# Patient Record
Sex: FEMALE | Race: BLACK OR AFRICAN AMERICAN | NOT HISPANIC OR LATINO | Employment: OTHER | URBAN - METROPOLITAN AREA
[De-identification: names, ages, dates, MRNs, and addresses within clinical notes are randomized per-mention and may not be internally consistent; named-entity substitution may affect disease eponyms.]

---

## 2017-03-15 DIAGNOSIS — G70.00 MYASTHENIA GRAVIS: ICD-10-CM

## 2017-03-15 RX ORDER — PYRIDOSTIGMINE BROMIDE 60 MG/1
TABLET ORAL
Qty: 90 TABLET | Refills: 3 | Status: SHIPPED | OUTPATIENT
Start: 2017-03-15 | End: 2017-07-12 | Stop reason: SDUPTHER

## 2017-06-23 ENCOUNTER — TELEPHONE (OUTPATIENT)
Dept: NEUROLOGY | Facility: CLINIC | Age: 62
End: 2017-06-23

## 2017-06-23 NOTE — TELEPHONE ENCOUNTER
----- Message from Evelyn Suh sent at 6/23/2017 11:48 AM CDT -----  Contact: Patient [phone]  Patient is returning Sue's call. Please call

## 2017-07-12 ENCOUNTER — OFFICE VISIT (OUTPATIENT)
Dept: NEUROLOGY | Facility: CLINIC | Age: 62
End: 2017-07-12
Payer: COMMERCIAL

## 2017-07-12 ENCOUNTER — TELEPHONE (OUTPATIENT)
Dept: NEUROLOGY | Facility: CLINIC | Age: 62
End: 2017-07-12

## 2017-07-12 VITALS
BODY MASS INDEX: 24.52 KG/M2 | HEART RATE: 74 BPM | DIASTOLIC BLOOD PRESSURE: 80 MMHG | SYSTOLIC BLOOD PRESSURE: 137 MMHG | HEIGHT: 70 IN | WEIGHT: 171.31 LBS

## 2017-07-12 DIAGNOSIS — E11.9 TYPE 2 DIABETES MELLITUS WITHOUT COMPLICATION, WITH LONG-TERM CURRENT USE OF INSULIN: ICD-10-CM

## 2017-07-12 DIAGNOSIS — G70.00 MYASTHENIA GRAVIS: Primary | ICD-10-CM

## 2017-07-12 DIAGNOSIS — Z79.4 TYPE 2 DIABETES MELLITUS WITHOUT COMPLICATION, WITH LONG-TERM CURRENT USE OF INSULIN: ICD-10-CM

## 2017-07-12 DIAGNOSIS — Z90.89 H/O THYMECTOMY: ICD-10-CM

## 2017-07-12 PROCEDURE — 99213 OFFICE O/P EST LOW 20 MIN: CPT | Mod: S$GLB,,, | Performed by: PHYSICIAN ASSISTANT

## 2017-07-12 PROCEDURE — 99999 PR PBB SHADOW E&M-EST. PATIENT-LVL III: CPT | Mod: PBBFAC,,, | Performed by: PHYSICIAN ASSISTANT

## 2017-07-12 RX ORDER — PREDNISONE 5 MG/1
TABLET ORAL
COMMUNITY
Start: 2017-05-08 | End: 2017-07-12 | Stop reason: SDUPTHER

## 2017-07-12 RX ORDER — PREDNISONE 1 MG/1
TABLET ORAL
COMMUNITY
Start: 2017-05-31 | End: 2017-07-12 | Stop reason: SDUPTHER

## 2017-07-12 RX ORDER — INSULIN DEGLUDEC 200 U/ML
INJECTION, SOLUTION SUBCUTANEOUS
COMMUNITY
Start: 2017-07-04

## 2017-07-12 RX ORDER — PREDNISONE 1 MG/1
3 TABLET ORAL DAILY
Qty: 90 TABLET | Refills: 3 | Status: SHIPPED | OUTPATIENT
Start: 2017-07-12 | End: 2018-02-27 | Stop reason: SDUPTHER

## 2017-07-12 RX ORDER — PYRIDOSTIGMINE BROMIDE 60 MG/1
30 TABLET ORAL 2 TIMES DAILY
Qty: 45 TABLET | Refills: 3 | Status: SHIPPED | OUTPATIENT
Start: 2017-07-12 | End: 2018-02-27 | Stop reason: SDUPTHER

## 2017-07-12 RX ORDER — INSULIN ASPART 100 [IU]/ML
INJECTION, SOLUTION INTRAVENOUS; SUBCUTANEOUS
COMMUNITY
Start: 2017-06-07

## 2017-07-12 RX ORDER — PREDNISONE 5 MG/1
5 TABLET ORAL DAILY
Qty: 30 TABLET | Refills: 5 | Status: SHIPPED | OUTPATIENT
Start: 2017-07-12 | End: 2018-02-27 | Stop reason: SDUPTHER

## 2017-07-12 NOTE — TELEPHONE ENCOUNTER
RN contacted patient regarding medication. Provider states for patient to decrease Prednisone to 8mg and continue Mestion 30mg BID. Patient verbalized understanding.     Schedule MBSS at [facility] (Ph [phone]) for 7/17/17 at 8AM. Faxed order, clinic notes and demographic info (Fax [phone]).

## 2017-07-12 NOTE — PATIENT INSTRUCTIONS
Decrease Prednisone to 8 mg QD.  Continue Mestinon 30 mg twice daily.    We will get a swallow study done locally.     Can try taking half a pill of Mestinon once daily for one week, if no change in symptoms can try stopping the Mestinon.

## 2017-07-12 NOTE — PROGRESS NOTES
[facility], Department of Neurology   [facility] [place] [address]  Phone:[phone]  Fax: [phone]    Patient Name: Louisa Quarles  : 1955  MRN:  [id number]    2017     chief complaint: myasthenia gravis     PCP: Manish Anderson MD.      DX: Ab+ MG, dx'ed   Thymus: s/p thymectomy 2011     Mntc:   *Prednisone 40mg/d, starting ; decreased from 20 -> 15mg/d  on 14; decreased from 15mg/d to 10mg/d on 10/6/14; prednisone 7mg/d to 5mg/d 2015     *Mestinon 30mg tid to bid      Prior medications  *Imuran started 50mg/d 3/31/14, increased to 50mg bid ; increased to 100mg/50mg 10/6/14; stopped 5/15 d/t marrow suppression     Rescue:  PLEX, last one 2014 - moderately helpful  IVIG Dec 26-30, mildly effective     Interval hx 17:  Louisa Quarles is a 63 yo female with GERD, DMII with peripheral neuropathy and Ab+ oculobulbar MG dx  s/p thymectomy. Currently on Prednisone 9 mg qd (dec from 10 last visit) and Mestinon 30 mg bid.     Stopped omeprazole and notes swallowing has much improved. Still does have occasional difficulty though.     She is eager to stop some of her many meds, feels like they cause side effects. Last a1c was 8.     Occasionally feels generalized weakness but still able to perform all daily activities. Has some instances of feeling off balance which she attributes to her neuropathy. Denies ptosis, diplopia. Denies sob/orthopnea.       Interval hx 16:  Louisa Quarles is a 61 female with DMII and Ab+ oculobulbar MG dx'ed , s/p thymectomy . MG maintained on Prednisone 10 mg qOD (since 2016) and Mestinon 30 mg tid. MG overall stable. Does note long hx of sensation of food getting stuck when swallowing. States she is seeing her PCP soon for this and he may order endoscopy. When she takes her time chewing, this sensation is decreased. Has noticed when she begins walking down the stairs has to  "hold on to rail. Recent A1c of 8.6, PCP in process of better management of DM. Denies diplopia/ptosis, SOB, extremity weakness.    HPI 6/30/16:  Louisa Quarles is a 60 yo female with DMII and Ab+ oculobulbar MG dx'ed 2011 with hx of thymoma s/p thymectomy 11/2011. States she is doing well, does have occasional generalized weakness/ muscle aches. Notices a little difficulty with balance, denies falls. MG is well maintained on Prednisone 10 mg qOD (since 1/2016) and Mestinon 30 mg BID. D/C'ed Imuran 5/2015 due to marrow suppression. Has had both IVIG and PLEX in the past with minimal improvement. Denies SOB, diplopia/ptosis, dysarthria.       Interval 1/15/16: Doing well. No diplopia, ptosis, dysarthria or weakness. Prednisone 5mg/d, began last April.      Interval 8/19/15: Stopped imuran in May d/t marrow suppression. She was supposed to call me in June for follow up, but did not do so. She returns today in follow up.      Currently doing well. Minor symptoms.      Interval 4/6/15:  Doing well. No difficulty with diplopia, ptosis, dysarthria or dysphagia. No breathing issues.      Interval 1/5/15:  No problems chewing and swallowing. Vision has been 'okay'. No ptosis or diplopia. Sugar was better after we decreased the prednisone, but here lately has been a little higher.      Interval 10/6/14: No ptosis, no diplopia, no chewing and swallowing problems. No peripheral weakness. Has some blurry vision, but has been to ophtho and no cataracts. Sugars are running a little high - 300s are the norm.      Interval history: Began prednisone 40mg/d last month, along with mestinon. She's doing better in regards to ptosis, diplopia, chewing and swallowing. She feels good, but says that her vision is getting "bad". She has trouble with reading. She will make an eye appointment this week. Her sugar has been running a little high - rarely 300s - she's trying to control her diet and take her meds as prescribed.      Interval " "history: Ab+ MG s/p thymectomy, recently admitted to hospital for exacerbation and s/p PLEX and initiation of prednisone, returns today two weeks post-discharge. She was only provided with ten days of prednisone on discharge and she did not call the clinic to obtain more medication. She is starting to have more trouble swallowing in the last week or so. Her speech is also rather dysarthric.      HPI: Returns today. Her MG has been slowly getting worse over the last few months. More trouble with speech, swallowing and arm > leg weakness. Right eye ptosis.      Received IVIg Dec 26-30. She says that it "helped some", but did not improve her symptoms as much as she had hoped.      She requires liquids to help her swallow solid food b/c it "gets hung up" if she doesn't. She is thickening her liquids, not b/c she's choking, but b/c she's worried about choking. She doesn't have jorge luis orthopnea.      She has resumed taking mestinon, but she doesn't see that it's helping.      Her head is "heavy" lately - having trouble balancing it on her neck.      Background:  Louisa Quarles is a 60 y.o. female with Ab+, oculobulbar MG, diagnosed in 2011 after presenting with bulbar symptoms (ptosis, diplopia, dsyarthria). She responded to IVIg and underwent a workup, which revealed a thymoma. She is s/p thymectomy in November of 2011 followed by adjuvant radiation therapy. She has been maintained on Imuran 50mg/day, mestinon 30mg tid. She has been on prednisone in the past, but it was discontinued in December 2012.       Medications:   Current Outpatient Prescriptions   Medication Sig Dispense Refill    ACCU-CHEK SHAKIRA PLUS METER Misc       ACCU-CHEK SHAKIRA PLUS Strp       ACCU-CHEK MULTICLIX LANCET lancets       alpha lipoic acid 600 mg Cap Take by mouth once daily.      aspirin 81 MG chewable tablet Take 81 mg by mouth once daily.        atorvastatin (LIPITOR) 10 MG tablet Take 10 mg by mouth once daily.      azathioprine " (IMURAN) 50 mg Tab Take 2 pills in the morning and one pill in the evening. 270 tablet 3    AZELASTINE/FLUTICASONE (DYMISTA NASL) by Nasal route.      calcium carbonate (OS-MAE) 600 mg (1,500 mg) Tab Take 600 mg by mouth 2 (two) times daily with meals.        calcium citrate-vitamin D3 315-200 mg (CITRACAL+D) 315-200 mg-unit per tablet Take by mouth 2 (two) times daily.      carvedilol (COREG) 3.125 MG tablet Take 3.125 mg by mouth 2 (two) times daily with meals.      citalopram (CELEXA) 20 MG tablet Take 20 mg by mouth once daily.        docusate sodium (COLACE) 100 MG capsule Take 100 mg by mouth.        HUMALOG KWIKPEN 100 unit/mL InPn pen       insulin aspart protamine-insulin aspart (NOVOLOG 70/30) 100 unit/mL (70-30) InPn Inject 100 Units into the skin 3 (three) times daily.        insulin detemir (LEVEMIR) 100 unit/mL injection Inject 100 Units into the skin every evening.        insulin glargine (LANTUS) 100 unit/mL Crtg Inject 60 Units into the skin once daily.        ipratropium (ATROVENT) 0.03 % nasal spray 2 sprays by Nasal route 2 (two) times daily.      magnesium oxide-Mg AA chelate (MG-PLUS-PROTEIN) 133 mg Tab Take 250 mg by mouth.      melatonin 1 mg Tab Take by mouth.        metformin (GLUCOPHAGE) 500 MG tablet Take 500 mg by mouth before breakfast.        NOVOLOG FLEXPEN 100 unit/mL InPn pen       nystatin (MYCOSTATIN) 100,000 unit/mL suspension 100,000 mg.      omeprazole (PRILOSEC) 40 MG capsule Take 1 capsule (40 mg total) by mouth once daily. 30 capsule 10    oxycodone-acetaminophen (PERCOCET) 5-325 mg per tablet Take 1 tablet by mouth every 6 (six) hours as needed.        potassium chloride (KLOR-CON) 20 mEq packet Take 20 mEq by mouth once daily. 30 tablet 10    predniSONE (DELTASONE) 1 MG tablet       predniSONE (DELTASONE) 5 MG tablet       pyridostigmine (MESTINON) 60 mg Tab TAKE ONE-HALF TABLET BY MOUTH THREE TIMES DAILY 90 tablet 3    TRESIBA FLEXTOUCH U-200 200  "unit/mL (3 mL) InPn       vitamin D 185 MG Tab Take 185 mg by mouth once daily.        zolpidem (AMBIEN) 10 mg Tab 10 mg.       No current facility-administered medications for this visit.        Allergies:  No Known Allergies    PMHx:  Past Medical History:   Diagnosis Date    Cancer     Diabetes mellitus     Headache(784.0)     HEARING LOSS     Memory loss     Thymoma     Vision abnormalities     Stargadtch's disease/Macular degeneration -causes vision loss bilaterall, right worse than left      Past Surgical History:   Procedure Laterality Date    COLONOSCOPY      thymus removal         Fhx:  Family History   Problem Relation Age of Onset    Cancer Sister      breast    Diabetes Sister        Shx:   Social History     Social History    Marital status: Single     Spouse name: N/A    Number of children: N/A    Years of education: N/A     Occupational History    Not on file.     Social History Main Topics    Smoking status: Former Smoker     Packs/day: 0.50     Years: 10.00     Types: Cigarettes     Quit date: 8/1/2011    Smokeless tobacco: Not on file    Alcohol use No    Drug use: No    Sexual activity: Not on file     Other Topics Concern    Not on file     Social History Narrative    No narrative on file       ROS:  Review of Systems (Questions asked; positives or additions noted in BOLD)  Gen Malaise/fatigue, weight change   HEENT Hearing loss, blurred vision, diplopia   Card CP, palpitations   Pulm SOB, cough    Vasc Easy bruising, easy bleeding    GI Nausea, vomiting, constipation    Frequency, urgency   M/S Joint pain, myalgias, falls    Endocrine Temperature intolerance, polyuria, polydipsia   Neuro Dizziness, tremors, seizures, focal weakness, Others- as noted in HPI   Psych Memory loss, depression, anxiety, hallucinations     PHYSICAL EXAM:   /80   Pulse 74   Ht 5' 10" (1.778 m)   Wt 77.7 kg (171 lb 4.8 oz)   BMI 24.58 kg/m²    GEN:  NAD, well-developed, " well-groomed.  HEENT: No cervical lymphadenopathy noted.  CARDIOVASCULAR: Radial pulses 2+ bilaterally. No LE edema noted.  NEURO:  Mental Status: Awake, alert, and oriented. Normal attention and concentration.  Speech is fluent and appropriate language function.    Cranial Nerves:  II Pupils are round and reactive to direct and consensual light and accommodation. Visual fields full to confrontation.   III, IV, VI Extraocular eye movements full bilaterally. No ptosis noted.     V Normal facial sensation to pinprick and light touch.   VII Symmetric facial expressions.   VIII Hearing intact to finger rub bilaterally.   IX, X Palate elevates midline and symmetrically.   XI Shoulder elevation is symmetric and full strength bilaterally. Neck rotation strength is normal bilaterally.   XII Tongue is midline.     Sensory: Sensation is slightly decreased to vibration in bilateral ankles, normal to pinprick.     Motor: Extremities demonstrate normal bulk and tone in all four limbs. No atrophy or fasciculations noted.   Strength-       RUE: 5/5                LUE: 5/5                RLE: 5/5                LLE: 5/5   No orbicularis oris weakness or orbicularis oculi weakness.   No neck extension/flexion weakness.     Deep Tendon Relfexes: 2+ and symmetric in the upper extremities, trace at the knees and ankles.     Coordination: Finger to nose WNL.     Gait: Normal casual gait.    ASSESSMENT:     ICD-10-CM ICD-9-CM   1. Myasthenia gravis G70.00 358.00   2. Type 2 diabetes mellitus without complication, with long-term current use of insulin E11.9 250.00    Z79.4 V58.67       PLAN:     No orders of the defined types were placed in this encounter.    Louisa Quarles is a 60 y.o. female with DMII and Ab+ MG, s/p thymectomy. MG well controlled. Mild reduction to vibration in feet, likely diabetic neuropathy.     -decrease Prednisone to 8 mg qd   -would still like to obtain MBSS, will have RN send orders to [facility]  -RTC 6-8  wks         The patient indicates understanding of these issues and agrees to the plan.      Attending, Dr. Chaudhari, was available during today's encounter. Any change to plan along with cosign to appear in the EMR.       This document has been electronically signed by Donna Caceres PA-C on 7/12/2017, 8:14 AM. I have personally typed this message using the EMR.

## 2017-08-01 ENCOUNTER — DOCUMENTATION ONLY (OUTPATIENT)
Dept: NEUROLOGY | Facility: CLINIC | Age: 62
End: 2017-08-01

## 2017-09-11 ENCOUNTER — TELEPHONE (OUTPATIENT)
Dept: NEUROLOGY | Facility: CLINIC | Age: 62
End: 2017-09-11

## 2017-09-11 NOTE — TELEPHONE ENCOUNTER
Patient would like to reschedule appt for later date. Mrs. Quarles stated the [facility] ([phone]) set a letter dated 8/4/7 Claim# [id number] requesting a referral from Dr. Chaudhari... In order to approve appt. She has an appt with PCP, Dr. Anderson on 9/12/17 at 10:45AM.

## 2017-09-11 NOTE — TELEPHONE ENCOUNTER
----- Message from Evelyn Suh sent at 9/11/2017  3:44 PM CDT -----  Contact: Patient [phone]  Patient states she received a letter from her insurance [employer], that states our office has to send a referral to them for the appt on 9/12/17.

## 2017-10-30 ENCOUNTER — TELEPHONE (OUTPATIENT)
Dept: NEUROLOGY | Facility: CLINIC | Age: 62
End: 2017-10-30

## 2017-10-30 NOTE — TELEPHONE ENCOUNTER
----- Message from Kelli Ponce sent at 10/30/2017 11:03 AM CDT -----  Contact: Self  Pt is calling to speak with Ms. Rogers regarding her appts.    She can be reached at [phone].    Thank you.

## 2017-11-01 ENCOUNTER — TELEPHONE (OUTPATIENT)
Dept: NEUROLOGY | Facility: CLINIC | Age: 62
End: 2017-11-01

## 2017-11-01 NOTE — TELEPHONE ENCOUNTER
----- Message from Indigo Lopez sent at 11/1/2017  3:34 PM CDT -----  Contact: self @ [phone]  Pt says Sue told her to call her concerning an appt with Dr Chaudhari.  I tried to offer her an appt but she wants to speak with Sue.  pls call.

## 2018-02-27 ENCOUNTER — OFFICE VISIT (OUTPATIENT)
Dept: NEUROLOGY | Facility: CLINIC | Age: 63
End: 2018-02-27
Payer: COMMERCIAL

## 2018-02-27 VITALS — SYSTOLIC BLOOD PRESSURE: 138 MMHG | HEART RATE: 67 BPM | HEIGHT: 69 IN | DIASTOLIC BLOOD PRESSURE: 89 MMHG

## 2018-02-27 DIAGNOSIS — E11.9 TYPE 2 DIABETES MELLITUS WITHOUT COMPLICATION, UNSPECIFIED LONG TERM INSULIN USE STATUS: ICD-10-CM

## 2018-02-27 DIAGNOSIS — Z79.52 ON PREDNISONE THERAPY: ICD-10-CM

## 2018-02-27 DIAGNOSIS — Z90.89 HISTORY OF THYMECTOMY: ICD-10-CM

## 2018-02-27 DIAGNOSIS — G70.00 MYASTHENIA GRAVIS: ICD-10-CM

## 2018-02-27 DIAGNOSIS — G70.00 MYASTHENIA GRAVIS, ACHR ANTIBODY POSITIVE: Primary | ICD-10-CM

## 2018-02-27 DIAGNOSIS — Z92.3 HISTORY OF RADIATION THERAPY: ICD-10-CM

## 2018-02-27 PROCEDURE — 99215 OFFICE O/P EST HI 40 MIN: CPT | Mod: S$GLB,,, | Performed by: PSYCHIATRY & NEUROLOGY

## 2018-02-27 PROCEDURE — 99999 PR PBB SHADOW E&M-EST. PATIENT-LVL III: CPT | Mod: PBBFAC,,, | Performed by: PSYCHIATRY & NEUROLOGY

## 2018-02-27 RX ORDER — PREDNISONE 5 MG/1
TABLET ORAL
Qty: 45 TABLET | Refills: 3 | Status: SHIPPED | OUTPATIENT
Start: 2018-02-27 | End: 2018-07-10

## 2018-02-27 RX ORDER — PREDNISONE 1 MG/1
TABLET ORAL
Qty: 45 TABLET | Refills: 3 | Status: SHIPPED | OUTPATIENT
Start: 2018-02-27 | End: 2018-07-10 | Stop reason: DRUGHIGH

## 2018-02-27 RX ORDER — PYRIDOSTIGMINE BROMIDE 60 MG/1
30 TABLET ORAL 2 TIMES DAILY
Qty: 90 TABLET | Refills: 3 | Status: SHIPPED | OUTPATIENT
Start: 2018-02-27 | End: 2018-07-10 | Stop reason: DRUGHIGH

## 2018-02-27 NOTE — LETTER
February 27, 2018      Manish Anderson MD  [address] [facility]  [place] [address]           [facility]  [address]  [place] [address]  Phone: [phone]  Fax: [phone]          Patient: Louisa Quarles   MR Number: [id number]   YOB: 1955   Date of Visit: 2/27/2018       Dear Dr. Manish Anderson:    Thank you for referring Louisa Quarles to me for evaluation. Attached you will find relevant portions of my assessment and plan of care.    If you have questions, please do not hesitate to call me. I look forward to following Louisa Quarles along with you.    Sincerely,    Adrian Chaudhari MD    Enclosure  CC:  No Recipients    If you would like to receive this communication electronically, please contact [email or web address] or [phone] to request more information on [employer] Link access.    For providers and/or their staff who would like to refer a patient to [facility], please contact us through our one-stop-shop provider referral line, [facility] [place], at [phone].    If you feel you have received this communication in error or would no longer like to receive these types of communications, please e-mail [email or web address]

## 2018-02-27 NOTE — PROGRESS NOTES
[facility], Department of Neurology   [address] [place] [address]  Phone:[phone]  Fax: [phone]    Patient Name: Louisa Quarles  : 1955  MRN:  [id number]    2018     chief complaint: myasthenia gravis     PCP: Manish Anderson MD.      DX: Ab+ MG, dx'ed   Thymus: s/p thymectomy 2011     Mntc:   *Prednisone 40mg/d, starting ; decreased from 20 -> 15mg/d  on 14; decreased from 15mg/d to 10mg/d on 10/6/14; prednisone 7mg/d to 5mg/d 2015     *Mestinon 30mg tid to bid      Prior medications  *Imuran started 50mg/d 3/31/14, increased to 50mg bid ; increased to 100mg/50mg 10/6/14; stopped 5/15 d/t marrow suppression     Rescue:  PLEX, last one 2014 - moderately helpful  IVIG Dec 26-30, mildly effective    Interval history 18  Dropped prednisone to 8mg/d at  visit.  Now taking it 8mg qOD.   MBSS : normal     Interval hx 17:  Louisa Quarles is a 63 yo female with GERD, DMII with peripheral neuropathy and Ab+ oculobulbar MG dx  s/p thymectomy. Currently on Prednisone 9 mg qd (dec from 10 last visit) and Mestinon 30 mg bid.     Stopped omeprazole and notes swallowing has much improved. Still does have occasional difficulty though.     She is eager to stop some of her many meds, feels like they cause side effects. Last a1c was 8.     Occasionally feels generalized weakness but still able to perform all daily activities. Has some instances of feeling off balance which she attributes to her neuropathy. Denies ptosis, diplopia. Denies sob/orthopnea.       Interval hx 16:  Louisa Quarles is a 61 female with DMII and Ab+ oculobulbar MG dx'ed , s/p thymectomy . MG maintained on Prednisone 10 mg qOD (since 2016) and Mestinon 30 mg tid. MG overall stable. Does note long hx of sensation of food getting stuck when swallowing. States she is seeing her PCP soon for this and he may order endoscopy. When  "she takes her time chewing, this sensation is decreased. Has noticed when she begins walking down the stairs has to hold on to rail. Recent A1c of 8.6, PCP in process of better management of DM. Denies diplopia/ptosis, SOB, extremity weakness.    HPI 6/30/16:  Louisa Quarles is a 60 yo female with DMII and Ab+ oculobulbar MG dx'ed 2011 with hx of thymoma s/p thymectomy 11/2011. States she is doing well, does have occasional generalized weakness/ muscle aches. Notices a little difficulty with balance, denies falls. MG is well maintained on Prednisone 10 mg qOD (since 1/2016) and Mestinon 30 mg BID. D/C'ed Imuran 5/2015 due to marrow suppression. Has had both IVIG and PLEX in the past with minimal improvement. Denies SOB, diplopia/ptosis, dysarthria.       Interval 1/15/16: Doing well. No diplopia, ptosis, dysarthria or weakness. Prednisone 5mg/d, began last April.      Interval 8/19/15: Stopped imuran in May d/t marrow suppression. She was supposed to call me in June for follow up, but did not do so. She returns today in follow up.      Currently doing well. Minor symptoms.      Interval 4/6/15:  Doing well. No difficulty with diplopia, ptosis, dysarthria or dysphagia. No breathing issues.      Interval 1/5/15:  No problems chewing and swallowing. Vision has been 'okay'. No ptosis or diplopia. Sugar was better after we decreased the prednisone, but here lately has been a little higher.      Interval 10/6/14: No ptosis, no diplopia, no chewing and swallowing problems. No peripheral weakness. Has some blurry vision, but has been to ophtho and no cataracts. Sugars are running a little high - 300s are the norm.      Interval history: Began prednisone 40mg/d last month, along with mestinon. She's doing better in regards to ptosis, diplopia, chewing and swallowing. She feels good, but says that her vision is getting "bad". She has trouble with reading. She will make an eye appointment this week. Her sugar has been " "running a little high - rarely 300s - she's trying to control her diet and take her meds as prescribed.      Interval history: Ab+ MG s/p thymectomy, recently admitted to hospital for exacerbation and s/p PLEX and initiation of prednisone, returns today two weeks post-discharge. She was only provided with ten days of prednisone on discharge and she did not call the clinic to obtain more medication. She is starting to have more trouble swallowing in the last week or so. Her speech is also rather dysarthric.      HPI: Returns today. Her MG has been slowly getting worse over the last few months. More trouble with speech, swallowing and arm > leg weakness. Right eye ptosis.      Received IVIg Dec 26-30. She says that it "helped some", but did not improve her symptoms as much as she had hoped.      She requires liquids to help her swallow solid food b/c it "gets hung up" if she doesn't. She is thickening her liquids, not b/c she's choking, but b/c she's worried about choking. She doesn't have jorge luis orthopnea.      She has resumed taking mestinon, but she doesn't see that it's helping.      Her head is "heavy" lately - having trouble balancing it on her neck.      Background:  Louisa Quarles is a 60 y.o. female with Ab+, oculobulbar MG, diagnosed in 2011 after presenting with bulbar symptoms (ptosis, diplopia, dsyarthria). She responded to IVIg and underwent a workup, which revealed a thymoma. She is s/p thymectomy in November of 2011 followed by adjuvant radiation therapy. She has been maintained on Imuran 50mg/day, mestinon 30mg tid. She has been on prednisone in the past, but it was discontinued in December 2012.       Medications:   Current Outpatient Prescriptions   Medication Sig Dispense Refill    ACCU-CHEK SHAKIRA PLUS METER Misc       ACCU-CHEK SHAKIRA PLUS Strp       ACCU-CHEK MULTICLIX LANCET lancets       alpha lipoic acid 600 mg Cap Take by mouth once daily.      aspirin 81 MG chewable tablet Take 81 mg " by mouth once daily.        atorvastatin (LIPITOR) 10 MG tablet Take 10 mg by mouth once daily.      azathioprine (IMURAN) 50 mg Tab Take 2 pills in the morning and one pill in the evening. 270 tablet 3    AZELASTINE/FLUTICASONE (DYMISTA NASL) by Nasal route.      calcium carbonate (OS-MAE) 600 mg (1,500 mg) Tab Take 600 mg by mouth 2 (two) times daily with meals.        calcium citrate-vitamin D3 315-200 mg (CITRACAL+D) 315-200 mg-unit per tablet Take by mouth 2 (two) times daily.      carvedilol (COREG) 3.125 MG tablet Take 3.125 mg by mouth 2 (two) times daily with meals.      citalopram (CELEXA) 20 MG tablet Take 20 mg by mouth once daily.        docusate sodium (COLACE) 100 MG capsule Take 100 mg by mouth.        HUMALOG KWIKPEN 100 unit/mL InPn pen       insulin aspart protamine-insulin aspart (NOVOLOG 70/30) 100 unit/mL (70-30) InPn Inject 100 Units into the skin 3 (three) times daily.        insulin detemir (LEVEMIR) 100 unit/mL injection Inject 100 Units into the skin every evening.        insulin glargine (LANTUS) 100 unit/mL Crtg Inject 60 Units into the skin once daily.        ipratropium (ATROVENT) 0.03 % nasal spray 2 sprays by Nasal route 2 (two) times daily.      magnesium oxide-Mg AA chelate (MG-PLUS-PROTEIN) 133 mg Tab Take 250 mg by mouth.      melatonin 1 mg Tab Take by mouth.        metformin (GLUCOPHAGE) 500 MG tablet Take 500 mg by mouth before breakfast.        NOVOLOG FLEXPEN 100 unit/mL InPn pen       nystatin (MYCOSTATIN) 100,000 unit/mL suspension 100,000 mg.      omeprazole (PRILOSEC) 40 MG capsule Take 1 capsule (40 mg total) by mouth once daily. 30 capsule 10    oxycodone-acetaminophen (PERCOCET) 5-325 mg per tablet Take 1 tablet by mouth every 6 (six) hours as needed.        potassium chloride (KLOR-CON) 20 mEq packet Take 20 mEq by mouth once daily. 30 tablet 10    predniSONE (DELTASONE) 1 MG tablet Take one tablet every other day 45 tablet 3    predniSONE  (DELTASONE) 5 MG tablet Take one tablet every other day 45 tablet 3    pyridostigmine (MESTINON) 60 mg Tab Take 0.5 tablets (30 mg total) by mouth 2 (two) times daily. 90 tablet 3    TRESIBA FLEXTOUCH U-200 200 unit/mL (3 mL) InPn       vitamin D 185 MG Tab Take 185 mg by mouth once daily.        zolpidem (AMBIEN) 10 mg Tab 10 mg.       No current facility-administered medications for this visit.        Allergies:  No Known Allergies    PMHx:  Past Medical History:   Diagnosis Date    Cancer     Diabetes mellitus     Headache(784.0)     HEARING LOSS     History of thymectomy 7/16/2012    Memory loss     Myasthenia exacerbation     Myasthenia gravis with acute exacerbation     Myasthenia gravis with thymoma     Thymoma     Vision abnormalities     Stargadtch's disease/Macular degeneration -causes vision loss bilaterall, right worse than left      Past Surgical History:   Procedure Laterality Date    COLONOSCOPY      thymus removal         Fhx:  Family History   Problem Relation Age of Onset    Cancer Sister      breast    Diabetes Sister        Shx:   Social History     Social History    Marital status: Single     Spouse name: N/A    Number of children: N/A    Years of education: N/A     Occupational History    Not on file.     Social History Main Topics    Smoking status: Former Smoker     Packs/day: 0.50     Years: 10.00     Types: Cigarettes     Quit date: 8/1/2011    Smokeless tobacco: Not on file    Alcohol use No    Drug use: No    Sexual activity: Not on file     Other Topics Concern    Not on file     Social History Narrative    No narrative on file       ROS:  Review of Systems (Questions asked; positives or additions noted in BOLD)  Gen Malaise/fatigue, weight change   HEENT Hearing loss, blurred vision, diplopia   Card CP, palpitations   Pulm SOB, cough    Vasc Easy bruising, easy bleeding    GI Nausea, vomiting, constipation    Frequency, urgency   M/S Joint pain, myalgias,  "falls    Endocrine Temperature intolerance, polyuria, polydipsia   Neuro Dizziness, tremors, seizures, focal weakness, Others- as noted in HPI   Psych Memory loss, depression, anxiety, hallucinations     PHYSICAL EXAM:   /89   Pulse 67   Ht 5' 9" (1.753 m)    GEN:  NAD, well-developed, well-groomed.  HEENT: No cervical lymphadenopathy noted.  CARDIOVASCULAR: Radial pulses 2+ bilaterally. No LE edema noted.  NEURO:  Mental Status: Awake, alert, and oriented. Normal attention and concentration.  Speech is fluent and appropriate language function.    Cranial Nerves:  II Pupils are round and reactive to direct and consensual light and accommodation. Visual fields full to confrontation.   III, IV, VI Extraocular eye movements full bilaterally. No ptosis noted.     V Normal facial sensation to pinprick and light touch.   VII Symmetric facial expressions.   VIII Hearing intact to finger rub bilaterally.   IX, X Palate elevates midline and symmetrically.   XI Shoulder elevation is symmetric and full strength bilaterally. Neck rotation strength is normal bilaterally.   XII Tongue is midline.     Sensory: Sensation is slightly decreased to vibration in bilateral ankles, normal to pinprick.     Motor: Extremities demonstrate normal bulk and tone in all four limbs. No atrophy or fasciculations noted.   Strength-       RUE: 5/5                LUE: 5/5                RLE: 5/5                LLE: 5/5   No orbicularis oris weakness or orbicularis oculi weakness.   No neck extension/flexion weakness.     Deep Tendon Relfexes: 2+ and symmetric in the upper extremities, trace at the knees and ankles.     Coordination: Finger to nose WNL.     Gait: Normal casual gait.    ASSESSMENT:   Problem List Items Addressed This Visit     Myasthenia gravis, AChR antibody positive - Primary    Overview     Ab+, thymoma positive, s/p resection, generalized MG, diagnosed 2011  Prednisone, mestinon  Rescue: Plex, IVIG; both moderately " helpful  Prior: Imuran -> marrow suppression         Current Assessment & Plan     Stable  Decrease prednisone to 6mg qOD    Refer to Dr Aguilar  RTC 6 mos           Relevant Orders    Ambulatory Referral to Internal Medicine    History of thymectomy    Overview     November 2011  Thymoma+  S/p radiation therapy         History of radiation therapy    Overview     After thymectomy 2011         DM type 2 (diabetes mellitus, type 2)    Relevant Orders    Ambulatory Referral to Internal Medicine    On prednisone therapy    Relevant Orders    Ambulatory Referral to Internal Medicine      Other Visit Diagnoses     Myasthenia gravis        Relevant Medications    pyridostigmine (MESTINON) 60 mg Tab    predniSONE (DELTASONE) 5 MG tablet    predniSONE (DELTASONE) 1 MG tablet

## 2018-07-10 ENCOUNTER — OFFICE VISIT (OUTPATIENT)
Dept: NEUROLOGY | Facility: CLINIC | Age: 63
End: 2018-07-10
Payer: COMMERCIAL

## 2018-07-10 VITALS
HEART RATE: 80 BPM | SYSTOLIC BLOOD PRESSURE: 155 MMHG | WEIGHT: 167.13 LBS | HEIGHT: 69 IN | BODY MASS INDEX: 24.75 KG/M2 | DIASTOLIC BLOOD PRESSURE: 87 MMHG

## 2018-07-10 DIAGNOSIS — G70.00 MYASTHENIA GRAVIS, ACHR ANTIBODY POSITIVE: ICD-10-CM

## 2018-07-10 PROCEDURE — 99214 OFFICE O/P EST MOD 30 MIN: CPT | Mod: GC,S$GLB,, | Performed by: PSYCHIATRY & NEUROLOGY

## 2018-07-10 PROCEDURE — 99999 PR PBB SHADOW E&M-EST. PATIENT-LVL V: CPT | Mod: PBBFAC,GC,, | Performed by: PSYCHIATRY & NEUROLOGY

## 2018-07-10 RX ORDER — PREDNISONE 1 MG/1
1 TABLET ORAL DAILY
Qty: 30 TABLET | Refills: 5 | Status: SHIPPED | OUTPATIENT
Start: 2018-07-10 | End: 2018-08-09

## 2018-07-10 RX ORDER — EFINACONAZOLE 100 MG/ML
SOLUTION TOPICAL
COMMUNITY
Start: 2018-05-02

## 2018-07-10 RX ORDER — PYRIDOSTIGMINE BROMIDE 60 MG/1
30 TABLET ORAL 4 TIMES DAILY PRN
Qty: 120 TABLET | Refills: 5 | Status: SHIPPED | OUTPATIENT
Start: 2018-07-10 | End: 2019-07-18 | Stop reason: SDUPTHER

## 2018-07-10 RX ORDER — ATORVASTATIN CALCIUM 20 MG/1
20 TABLET, FILM COATED ORAL
COMMUNITY
Start: 2018-06-22

## 2018-07-10 RX ORDER — PREDNISONE 5 MG/1
5 TABLET ORAL DAILY
Qty: 30 TABLET | Refills: 5 | Status: SHIPPED | OUTPATIENT
Start: 2018-07-10 | End: 2018-08-09

## 2018-07-10 NOTE — ASSESSMENT & PLAN NOTE
Increase Prednisone to 6 mg DAILY  Mestinon 30 mg 2-4 times a day prn (increase from bid)  CBC, CMP

## 2018-07-10 NOTE — PROGRESS NOTES
"[facility], Department of Neurology   [facility] [place] [address]  Phone:[phone]  Fax: [phone]    Patient Name: Louisa Quarles  : 1955  MRN:  [id number]     PCP: Manish Anderson MD.    7/10/2018     DX: Ab+ MG, dx'ed   Thymic status- s/p thymectomy 2011  Maintenance: prednisone 6 mg QoD, Mestinon 30 mg bid- prior *Prednisone 40mg/d, starting ; decreased from 20 -> 15mg/d  on 14; decreased from 15mg/d to 10mg/d on 10/6/14; prednisone 7mg/d to 5mg/d 2015 *Mestinon 30mg tid to bid   Last dose change: 2018  Rescue: IVIG and PLEX in past- PLEX, last one 2014 - moderately helpful  IVIG Dec 26-30, mildly effective  Prior immunemodulatory agents: Prior medications:   *Imuran started 50mg/d 3/31/14, increased to 50mg bid ; increased to 100mg/50mg 10/6/14; stopped 5/15 d/t marrow suppression        Interval Hx 7/10/18: Complaints of generalized weakness including in proximal muscles in upper and lower extremities. Having increased bilateral ptosis. HAs been noticing weakness for a couple of months and is continuing to progress. Feels like her talking is "getting heavy." Has trouble swallowing food associated with chewing fatigue. Patient also with Stargardt disease- progressive macular degeneration. Denies diplopia. Patient endorsing SOB when doing things. Patient states her "head is heavy." Currently taking prednisone 6 mg QoD. Stopped imuran in the past due to BM suppression. Taking mestinon 1/2 tablet twice a day (30 mg bid). Patient takes medication with breakfast and supper (almost 12 hrs apart 7p, give or take). Does not appreciate worsening of her symptoms as her day progresses.     Interval history 18  Dropped prednisone to 8mg/d at  visit.  Now taking it 8mg qOD.   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 Currently on Prednisone 9 mg qd (dec from 10 last visit) and Mestinon 30 mg bid.     Stopped omeprazole and notes swallowing has much improved. Still does have occasional difficulty though.     She is eager to stop some of her many meds, feels like they cause side effects. Last a1c was 8.     Occasionally feels generalized weakness but still able to perform all daily activities. Has some instances of feeling off balance which she attributes to her neuropathy. Denies ptosis, diplopia. Denies sob/orthopnea.       Interval hx 9/9/16:  Louisa Quarles is a 61 female with DMII and Ab+ oculobulbar MG dx'ed 2011, s/p thymectomy 2011. MG maintained on Prednisone 10 mg qOD (since 1/2016) and Mestinon 30 mg tid. MG overall stable. Does note long hx of sensation of food getting stuck when swallowing. States she is seeing her PCP soon for this and he may order endoscopy. When she takes her time chewing, this sensation is decreased. Has noticed when she begins walking down the stairs has to hold on to rail. Recent A1c of 8.6, PCP in process of better management of DM. Denies diplopia/ptosis, SOB, extremity weakness.    HPI 6/30/16:  Louisa Quarles is a 60 yo female with DMII and Ab+ oculobulbar MG dx'ed 2011 with hx of thymoma s/p thymectomy 11/2011. States she is doing well, does have occasional generalized weakness/ muscle aches. Notices a little difficulty with balance, denies falls. MG is well maintained on Prednisone 10 mg qOD (since 1/2016) and Mestinon 30 mg BID. D/C'ed Imuran 5/2015 due to marrow suppression. Has had both IVIG and PLEX in the past with minimal improvement. Denies SOB, diplopia/ptosis, dysarthria.       Interval 1/15/16: Doing well. No diplopia, ptosis, dysarthria or weakness. Prednisone 5mg/d, began last April.      Interval 8/19/15: Stopped imuran in May d/t marrow suppression. She was supposed to call me in June for follow up, but did not do so. She returns today in  "follow up.      Currently doing well. Minor symptoms.      Interval 4/6/15:  Doing well. No difficulty with diplopia, ptosis, dysarthria or dysphagia. No breathing issues.      Interval 1/5/15:  No problems chewing and swallowing. Vision has been 'okay'. No ptosis or diplopia. Sugar was better after we decreased the prednisone, but here lately has been a little higher.      Interval 10/6/14: No ptosis, no diplopia, no chewing and swallowing problems. No peripheral weakness. Has some blurry vision, but has been to ophtho and no cataracts. Sugars are running a little high - 300s are the norm.      Interval history: Began prednisone 40mg/d last month, along with mestinon. She's doing better in regards to ptosis, diplopia, chewing and swallowing. She feels good, but says that her vision is getting "bad". She has trouble with reading. She will make an eye appointment this week. Her sugar has been running a little high - rarely 300s - she's trying to control her diet and take her meds as prescribed.      Interval history: Ab+ MG s/p thymectomy, recently admitted to hospital for exacerbation and s/p PLEX and initiation of prednisone, returns today two weeks post-discharge. She was only provided with ten days of prednisone on discharge and she did not call the clinic to obtain more medication. She is starting to have more trouble swallowing in the last week or so. Her speech is also rather dysarthric.      HPI: Returns today. Her MG has been slowly getting worse over the last few months. More trouble with speech, swallowing and arm > leg weakness. Right eye ptosis.      Received IVIg Dec 26-30. She says that it "helped some", but did not improve her symptoms as much as she had hoped.      She requires liquids to help her swallow solid food b/c it "gets hung up" if she doesn't. She is thickening her liquids, not b/c she's choking, but b/c she's worried about choking. She doesn't have jorge luis orthopnea.      She has resumed " "taking mestinon, but she doesn't see that it's helping.      Her head is "heavy" lately - having trouble balancing it on her neck.      Background:  Louisa Quarles is a 60 y.o. female with Ab+, oculobulbar MG, diagnosed in 2011 after presenting with bulbar symptoms (ptosis, diplopia, dsyarthria). She responded to IVIg and underwent a workup, which revealed a thymoma. She is s/p thymectomy in November of 2011 followed by adjuvant radiation therapy. She has been maintained on Imuran 50mg/day, mestinon 30mg tid. She has been on prednisone in the past, but it was discontinued in December 2012.       Medications:   Current Outpatient Prescriptions   Medication Sig Dispense Refill    ACCU-CHEK SHAKIRA PLUS METER Misc       ACCU-CHEK SHAKIRA PLUS Strp       ACCU-CHEK MULTICLIX LANCET lancets       alpha lipoic acid 600 mg Cap Take by mouth once daily.      aspirin 81 MG chewable tablet Take 81 mg by mouth once daily.        atorvastatin (LIPITOR) 20 MG tablet 20 mg.      azathioprine (IMURAN) 50 mg Tab Take 2 pills in the morning and one pill in the evening. 270 tablet 3    AZELASTINE/FLUTICASONE (DYMISTA NASL) by Nasal route.      calcium carbonate (OS-MAE) 600 mg (1,500 mg) Tab Take 600 mg by mouth 2 (two) times daily with meals.        calcium citrate-vitamin D3 315-200 mg (CITRACAL+D) 315-200 mg-unit per tablet Take by mouth 2 (two) times daily.      carvedilol (COREG) 3.125 MG tablet Take 3.125 mg by mouth 2 (two) times daily with meals.      citalopram (CELEXA) 20 MG tablet Take 20 mg by mouth once daily.        docusate sodium (COLACE) 100 MG capsule Take 100 mg by mouth.        HUMALOG KWIKPEN 100 unit/mL InPn pen       insulin aspart protamine-insulin aspart (NOVOLOG 70/30) 100 unit/mL (70-30) InPn Inject 100 Units into the skin 3 (three) times daily.        insulin detemir (LEVEMIR) 100 unit/mL injection Inject 100 Units into the skin every evening.        insulin glargine (LANTUS) 100 unit/mL " Crtg Inject 60 Units into the skin once daily.        ipratropium (ATROVENT) 0.03 % nasal spray 2 sprays by Nasal route 2 (two) times daily.      JUBLIA 10 % Martin       magnesium oxide-Mg AA chelate (MG-PLUS-PROTEIN) 133 mg Tab Take 250 mg by mouth.      melatonin 1 mg Tab Take by mouth.        metformin (GLUCOPHAGE) 500 MG tablet Take 500 mg by mouth before breakfast.        NOVOLOG FLEXPEN 100 unit/mL InPn pen       nystatin (MYCOSTATIN) 100,000 unit/mL suspension 100,000 mg.      omeprazole (PRILOSEC) 40 MG capsule Take 1 capsule (40 mg total) by mouth once daily. 30 capsule 10    oxycodone-acetaminophen (PERCOCET) 5-325 mg per tablet Take 1 tablet by mouth every 6 (six) hours as needed.        potassium chloride (KLOR-CON) 20 mEq packet Take 20 mEq by mouth once daily. 30 tablet 10    predniSONE (DELTASONE) 1 MG tablet Take one tablet every other day 45 tablet 3    predniSONE (DELTASONE) 5 MG tablet Take one tablet every other day 45 tablet 3    pyridostigmine (MESTINON) 60 mg Tab Take 0.5 tablets (30 mg total) by mouth 2 (two) times daily. 90 tablet 3    TRESIBA FLEXTOUCH U-200 200 unit/mL (3 mL) InPn       vitamin D 185 MG Tab Take 185 mg by mouth once daily.        zolpidem (AMBIEN) 10 mg Tab 10 mg.       No current facility-administered medications for this visit.        Allergies:  No Known Allergies    PMHx:  Past Medical History:   Diagnosis Date    Cancer     Diabetes mellitus     Headache(784.0)     HEARING LOSS     History of thymectomy 7/16/2012    Memory loss     Myasthenia exacerbation     Myasthenia gravis with acute exacerbation     Myasthenia gravis with thymoma     Thymoma     Vision abnormalities     Stargadtch's disease/Macular degeneration -causes vision loss bilaterall, right worse than left      Past Surgical History:   Procedure Laterality Date    COLONOSCOPY      thymus removal         Fhx:  Family History   Problem Relation Age of Onset    Cancer Sister       "   breast    Diabetes Sister        Shx:   Social History     Social History    Marital status: Single     Spouse name: N/A    Number of children: N/A    Years of education: N/A     Occupational History    Not on file.     Social History Main Topics    Smoking status: Former Smoker     Packs/day: 0.50     Years: 10.00     Types: Cigarettes     Quit date: 8/1/2011    Smokeless tobacco: Not on file    Alcohol use No    Drug use: No    Sexual activity: Not on file     Other Topics Concern    Not on file     Social History Narrative    No narrative on file       ROS:   Review of Systems   Constitutional:+  for malaise/fatigue. Negative for weight loss.   HENT: Negative for hearing loss.   Eyes: + for blurred vision (progressive macular degeneration) and neg for double vision.   Respiratory: + for shortness of breath on exertion and neg for stridor.   Cardiovascular: Negative for chest pain and palpitations.   Gastrointestinal: Negative for nausea, vomiting and constipation.   Genitourinary: Negative for frequency. Negative for urgency.   Musculoskeletal: Negative for joint pain. Negative for myalgias and falls.   Skin: Negative for rash.   Neurological: Negative for dizziness and tremors. Negative for focal weakness and seizures.   Endo/Heme/Allergies: Does not bruise/bleed easily.   Psychiatric/Behavioral: Negative for memory loss. Negative for depression and hallucinations. The patient is not nervous/anxious.        PHYSICAL EXAM:   BP (!) 155/87   Pulse 80   Ht 5' 9" (1.753 m)   Wt 75.8 kg (167 lb 1.7 oz)   BMI 24.68 kg/m²    Neurologic Exam:     Mental status:  -- Facial Expression: normal   -- Affect: full   -- Orientation to time & place: Oriented to time, place, person and situation   -- Attention & concentration: Normal attention span and concentration   -- Memory: Recent and remote memory intact  -- Language: Spontaneous, fluent; able to repeat and name objects  -- Fund of knowledge: Aware of " current events   -- Speech: some slurred speech noted as we spoke more    Cranial nerves:  -- II: pupils equal, round, reactive to light and accommodation, Visual fields are full to confrontation.    -- III,IV,VI: extraocular muscles EOMI   -- V: facial light touch sensation normal   -- VII: upper facial muscle function normal bilaterally  -- V & VII: Corneal reflex present bilaterally   -- VIII: Response to voice.  Hearing is normal bilaterally.    -- IX: soft palate elevation normal bilaterally  -- X: Gag reflex not tested  -- XI: trapezius strength normal bilaterally, XI: sternocleidomastoid strength normal bilaterally  -- XII: tongue normal, midline     Motor examination:   -- Muscle tone: Normal in all 4 extremities   -- Muscle Bulk: Normal in all 4 extremities normal   -- Muscle strength: 5/5 in the upper and lower extremities bilaterally  -- Pronator drift: No  -- Fasciculations: No   -- Tremors: No     Sensory examination:  -- Intact to pinprick, light touch, temperature, vibration and proprioception in the upper and lower extremities bilaterally.   -- Romberg is negative.     Deep tendon reflexes:  -- 2+ bilateral biceps, triceps, patella and ankles   -- Toes are down going bilaterally.     Cerebellar and Coordination:   Gait: Normal heel, toe, tandem, and casual gait   Finger-nose: No dysmetria  Rapid Alternating Movements (pronation/supination): R normal; L normal          MG Composite Scale  Ptosis, upward gaze (physician examination)   > 45 seconds   = 0 11 - 45 seconds  = 1 1 - 10 seconds  = 2 Immediate   = 3       3   Double vision on lateral gaze,   left or right   (physician examination) > 45 seconds  = 0 11 - 45 seconds  = 1 1 - 10 seconds  = 2 Immediate   = 3    0      Eye closure   (physician examination) Normal   = 0 Mild weakness   (forced open with effort)  = 0 Moderate weakness   (forced open easily)   = 1 Severe weakness (unable to keep eyes closed)  = 2          Talking   (patient history)  Normal   = 0 Intermittent slurring or nasal speech  = 2 Constant slurring or nasal but can be understood   = 4 Difficult to understand speech   = 6     2     Chewing   (patient history) Normal   = 0 Fatigue with solid food   = 2 Fatigue with soft food  = 4 Gastric tube  = 6      4    Swallowing   (patient history) Normal   = 0 Rare episode of choking or trouble swallowing   = 2 Frequent trouble swallowing   = 5 Gastric tube   = 6     2     Breathing   (thought to be caused by MG) Normal   = 0 Shortness of breath with exertion   = 2 Shortness of breath at rest   = 4 Ventilator dependence   = 9     2     Neck flexion or extension (weakest) (physician examination) Normal   = 0 Mild weakness   = 1 Moderate weakness (~50% +/- 15%)  = 3 Severe weakness = 4    0      Shoulder abduction (physician examination) Normal   = 0 Mild weakness  = 1 Moderate weakness   (~50% +/- 15%)  = 4 Severe weakness = 5    0      Hip flexion   (physician examination) Normal   = 0 Mild weakness   = 2 Moderate weakness   (~50% +/- 15%)  = 4 Severe weakness = 5    0      Total MGC Score 10     Myasthenia Gravis Manual Muscle Testing  0 = Normal / no impairment  1 = 25% weak / mild impairment l 2 = 50% weak / moderate impairment  3 = 75% weak / severe impairment l 4 = paralyzed / unable to do  Cranial Right Left Sum   Eyelid ptosis 2 2 4   Diplopia      Eye Closure  0   Cheek Puff  0   Jaw Closure  0   Tongue Protrusion  0   Cranial Muscle Score 4   Limb    Neck flexion  0   Neck extension  0   Shoulder abduction 0 0 0   Elbow flexion 0 0 0   Elbow extension 0 0 0   Wrist extension 0 0 0    0 0 0   Hip flexion 0 0 0   Knee extension 0 0 0   Knee flexion 0 0 0   Ankle dorsiflexion 0 0 0   Ankle plantarflexion 0 0 0   Limb Muscle Score 0   Total MMT Score 4     Myasthenia Gravis- Quality of Life Score (revised) - MG QoL-15r  Please indicate how true each statement has been (over the past four weeks).   Not at all Somewhat Very Much    0 1 2   1.  I am frustrated by my MG    1    2. I have trouble with my eyes because of my MG (e.g. double vision)   0     3. I have trouble eating because of MG    1    4. I have limited my social activity because of my MG     2   5. My MG limits my ability to enjoy hobbies and fun activities     2   6. I have trouble meeting the needs of my family because of my MG    1    7. I have to make plans around my MG    1    8. I am bothered by limitations in performing my work (include work at home) because of my MG   2   9. I have difficulty speaking due to MG    1    10. I have lost some personal independence because of my MG (e.g. driving, shopping, running errands) 0     11. I am depressed about my MG    1    12. I have trouble walking due to MG     2   13. I have trouble getting around public places because of my MG    1    14. I feel overwhelmed by my MG     2   15. I have trouble performing my personal grooming needs due to MG   0      Total MG-QoL15r Score 17           ASSESSMENT:     63 year old female with ab+ MG s/p thymectomy presents to clinic due to increased generalized weakness, inc bilateral ptosis, intermittent slurring of speech and issue with swallowing once in a while. Patient currently taking prednisone 6 mg QoD and mestinon 30 mg bid. Was previously on imuran until she had BM suppression issues. Has used IVIG and PLEX for rescue before with mild-moderate results. I will increase the frequency of prednisone to 6 mg DAILY and have her take mestinon up to 4 times a day as needed. Patient counseled on the importance of sleeping well and stress mgmt.         Problem List Items Addressed This Visit        Neuro    Myasthenia gravis, AChR antibody positive    Overview     Ab+, thymoma positive, s/p resection, generalized MG, diagnosed 2011  Prednisone, mestinon  Rescue: Plex, IVIG; both moderately helpful  Prior: Imuran -> marrow suppression         Current Assessment & Plan     Increase Prednisone to 6 mg  DAILY  Mestinon 30 mg 2-4 times a day prn (increase from bid)  CBC, CMP         Relevant Orders    CBC auto differential (Completed)    Comprehensive metabolic panel (Completed)        RTC in 1 month [facility] clinic     I have personally taken the history and examined the patient and agree with the resident's note as stated above.    Glenn Chaudhari MD, LUCAS, FAAN  Department of Neurology   [facility] [place], [place]

## 2018-07-10 NOTE — PATIENT INSTRUCTIONS
Increase prednisone to 6 mg DAILY  Can increase Pyridostigmine to 2-4 times per day as needed  Labs

## 2018-07-30 ENCOUNTER — DOCUMENTATION ONLY (OUTPATIENT)
Dept: NEUROLOGY | Facility: CLINIC | Age: 63
End: 2018-07-30

## 2018-07-30 DIAGNOSIS — G70.00 MYASTHENIA GRAVIS: Primary | ICD-10-CM

## 2018-07-30 RX ORDER — PREDNISONE 10 MG/1
40 TABLET ORAL DAILY
Qty: 120 TABLET | Refills: 1 | OUTPATIENT
Start: 2018-07-30 | End: 2018-08-09

## 2018-07-30 NOTE — PROGRESS NOTES
Called patient to discuss worsening symptoms. Called number on file. Went to . Left message.     Glenn Chaudhari MD, LUCAS, FAAN  Department of Neurology   [facility] [place], [place]

## 2018-07-30 NOTE — PROGRESS NOTES
Called patient back.     Patient says that she is getting weaker. Having more trouble with bilateral ptosis and more difficulty swallowing.   Her breathing is a little worse - but not all the time. Noticing a bit more YU.     Plan:   Let's try increasing prednisone to 40mg/d  Will call it in  Will need to watch her sugars.

## 2018-07-31 ENCOUNTER — TELEPHONE (OUTPATIENT)
Dept: NEUROLOGY | Facility: CLINIC | Age: 63
End: 2018-07-31

## 2018-08-07 ENCOUNTER — DOCUMENTATION ONLY (OUTPATIENT)
Dept: NEUROLOGY | Facility: CLINIC | Age: 63
End: 2018-08-07

## 2018-08-07 NOTE — PROGRESS NOTES
Called patient to see how she is doing on increased prednisone dose.   Call went to voicemail, so left a message.     Glenn Chaudhari MD, LUCAS, FAAN  Department of Neurology   [facility] [place], [place].

## 2018-08-10 ENCOUNTER — TELEPHONE (OUTPATIENT)
Dept: NEUROLOGY | Facility: CLINIC | Age: 63
End: 2018-08-10

## 2018-08-17 ENCOUNTER — TELEPHONE (OUTPATIENT)
Dept: NEUROLOGY | Facility: CLINIC | Age: 63
End: 2018-08-17

## 2018-08-17 NOTE — TELEPHONE ENCOUNTER
RN spoke with patient regarding status. Mrs. Jasso states she is weaker compared to last week. Also, c/o of difficulty chewing. Patient completed  Prednisone 40mg on 9/11. She decreased to 30mg

## 2018-08-17 NOTE — TELEPHONE ENCOUNTER
----- Message from Jose C Lara sent at 8/17/2018 11:33 AM CDT -----  Contact: pt  Needs Advice    Reason for call:    States her myasthenia gravis has been acting up. She would like to speak with someone regarding this matter.   Communication Preference: [phone]   Additional Information:

## 2018-08-23 ENCOUNTER — OFFICE VISIT (OUTPATIENT)
Dept: NEUROLOGY | Facility: CLINIC | Age: 63
End: 2018-08-23
Payer: COMMERCIAL

## 2018-08-23 VITALS
SYSTOLIC BLOOD PRESSURE: 160 MMHG | WEIGHT: 163.13 LBS | BODY MASS INDEX: 24.16 KG/M2 | HEART RATE: 61 BPM | HEIGHT: 69 IN | DIASTOLIC BLOOD PRESSURE: 81 MMHG

## 2018-08-23 DIAGNOSIS — G70.00 MYASTHENIA GRAVIS, ACHR ANTIBODY POSITIVE: Primary | ICD-10-CM

## 2018-08-23 PROCEDURE — 99999 PR PBB SHADOW E&M-EST. PATIENT-LVL III: CPT | Mod: PBBFAC,,, | Performed by: PSYCHIATRY & NEUROLOGY

## 2018-08-23 PROCEDURE — 99214 OFFICE O/P EST MOD 30 MIN: CPT | Mod: S$GLB,,, | Performed by: PSYCHIATRY & NEUROLOGY

## 2018-08-23 NOTE — PROGRESS NOTES
"[facility], Department of Neurology   [address] [place] [address]  Phone:[phone]  Fax: [phone]    Patient Name: Louisa Quarles  : 1955  MRN:  [id number]     PCP: Manish Anderson MD.    2018     DX: Ab+ MG, dx'ed   Thymic status- s/p thymectomy 2011  Maintenance: prednisone 6 mg QoD, Mestinon 30 mg bid; Started prednisone 40mg/d in early August for worsening symptoms. Not much beneift. prior *Prednisone 40mg/d, starting ; decreased from 20 -> 15mg/d  on 14; decreased from 15mg/d to 10mg/d on 10/6/14; prednisone 7mg/d to 5mg/d 2015 *Mestinon 30mg tid to bid   Last dose change: 2018  Rescue: IVIG and PLEX in past- PLEX, last one 2014 - moderately helpful  IVIG Dec 26-30, mildly effective  Prior immunemodulatory agents: Prior medications:   *Imuran started 50mg/d 3/31/14, increased to 50mg bid ; increased to 100mg/50mg 10/6/14; stopped 5/15 d/t marrow suppression     Interval hx 18:  Has been having more symptoms over the last few months. We increased her prednisone in July from qOD to daily without much benefit. Increased again in Aug to 40mg/d, but not helping much. Still having difficulty with chewing, swallowing, nasal speech and intermittent SOB and YU.     Interval Hx 7/10/18: Complaints of generalized weakness including in proximal muscles in upper and lower extremities. Having increased bilateral ptosis. HAs been noticing weakness for a couple of months and is continuing to progress. Feels like her talking is "getting heavy." Has trouble swallowing food associated with chewing fatigue. Patient also with Stargardt disease- progressive macular degeneration. Denies diplopia. Patient endorsing SOB when doing things. Patient states her "head is heavy." Currently taking prednisone 6 mg QoD. Stopped imuran in the past due to BM suppression. Taking mestinon 1/2 tablet twice a day (30 mg bid). Patient takes " medication with breakfast and supper (almost 12 hrs apart 7a/7p, give or take). Does not appreciate worsening of her symptoms as her day progresses.     Interval history 2/27/18  Dropped prednisone to 8mg/d at 7/17 visit.  Now taking it 8mg qOD.   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 Currently on Prednisone 9 mg qd (dec from 10 last visit) and Mestinon 30 mg bid.     Stopped omeprazole and notes swallowing has much improved. Still does have occasional difficulty though.     She is eager to stop some of her many meds, feels like they cause side effects. Last a1c was 8.     Occasionally feels generalized weakness but still able to perform all daily activities. Has some instances of feeling off balance which she attributes to her neuropathy. Denies ptosis, diplopia. Denies sob/orthopnea.       Interval hx 9/9/16:  Louisa Quarles is a 61 female with DMII and Ab+ oculobulbar MG dx'ed 2011, s/p thymectomy 2011. MG maintained on Prednisone 10 mg qOD (since 1/2016) and Mestinon 30 mg tid. MG overall stable. Does note long hx of sensation of food getting stuck when swallowing. States she is seeing her PCP soon for this and he may order endoscopy. When she takes her time chewing, this sensation is decreased. Has noticed when she begins walking down the stairs has to hold on to rail. Recent A1c of 8.6, PCP in process of better management of DM. Denies diplopia/ptosis, SOB, extremity weakness.    HPI 6/30/16:  Louisa Quarles is a 60 yo female with DMII and Ab+ oculobulbar MG dx'ed 2011 with hx of thymoma s/p thymectomy 11/2011. States she is doing well, does have occasional generalized weakness/ muscle aches. Notices a little difficulty with balance, denies falls. MG is well maintained on Prednisone 10 mg qOD (since 1/2016) and Mestinon 30 mg BID. D/C'ed Imuran 5/2015 due to marrow  "suppression. Has had both IVIG and PLEX in the past with minimal improvement. Denies SOB, diplopia/ptosis, dysarthria.       Interval 1/15/16: Doing well. No diplopia, ptosis, dysarthria or weakness. Prednisone 5mg/d, began last April.      Interval 8/19/15: Stopped imuran in May d/t marrow suppression. She was supposed to call me in June for follow up, but did not do so. She returns today in follow up.      Currently doing well. Minor symptoms.      Interval 4/6/15:  Doing well. No difficulty with diplopia, ptosis, dysarthria or dysphagia. No breathing issues.      Interval 1/5/15:  No problems chewing and swallowing. Vision has been 'okay'. No ptosis or diplopia. Sugar was better after we decreased the prednisone, but here lately has been a little higher.      Interval 10/6/14: No ptosis, no diplopia, no chewing and swallowing problems. No peripheral weakness. Has some blurry vision, but has been to ophtho and no cataracts. Sugars are running a little high - 300s are the norm.      Interval history: Began prednisone 40mg/d last month, along with mestinon. She's doing better in regards to ptosis, diplopia, chewing and swallowing. She feels good, but says that her vision is getting "bad". She has trouble with reading. She will make an eye appointment this week. Her sugar has been running a little high - rarely 300s - she's trying to control her diet and take her meds as prescribed.      Interval history: Ab+ MG s/p thymectomy, recently admitted to hospital for exacerbation and s/p PLEX and initiation of prednisone, returns today two weeks post-discharge. She was only provided with ten days of prednisone on discharge and she did not call the clinic to obtain more medication. She is starting to have more trouble swallowing in the last week or so. Her speech is also rather dysarthric.      HPI: Returns today. Her MG has been slowly getting worse over the last few months. More trouble with speech, swallowing and arm > " "leg weakness. Right eye ptosis.      Received IVIg Dec 26-30. She says that it "helped some", but did not improve her symptoms as much as she had hoped.      She requires liquids to help her swallow solid food b/c it "gets hung up" if she doesn't. She is thickening her liquids, not b/c she's choking, but b/c she's worried about choking. She doesn't have jorge luis orthopnea.      She has resumed taking mestinon, but she doesn't see that it's helping.      Her head is "heavy" lately - having trouble balancing it on her neck.      Background:  Louisa Quarles is a 60 y.o. female with Ab+, oculobulbar MG, diagnosed in 2011 after presenting with bulbar symptoms (ptosis, diplopia, dsyarthria). She responded to IVIg and underwent a workup, which revealed a thymoma. She is s/p thymectomy in November of 2011 followed by adjuvant radiation therapy. She has been maintained on Imuran 50mg/day, mestinon 30mg tid. She has been on prednisone in the past, but it was discontinued in December 2012.       Medications:   Current Outpatient Medications   Medication Sig Dispense Refill    ACCU-CHEK SHAKIRA PLUS METER Misc       ACCU-CHEK SHAKIRA PLUS Strp       ACCU-CHEK MULTICLIX LANCET lancets       alpha lipoic acid 600 mg Cap Take by mouth once daily.      aspirin 81 MG chewable tablet Take 81 mg by mouth once daily.        atorvastatin (LIPITOR) 20 MG tablet 20 mg.      azathioprine (IMURAN) 50 mg Tab Take 2 pills in the morning and one pill in the evening. 270 tablet 3    AZELASTINE/FLUTICASONE (DYMISTA NASL) by Nasal route.      calcium carbonate (OS-MAE) 600 mg (1,500 mg) Tab Take 600 mg by mouth 2 (two) times daily with meals.        calcium citrate-vitamin D3 315-200 mg (CITRACAL+D) 315-200 mg-unit per tablet Take by mouth 2 (two) times daily.      carvedilol (COREG) 3.125 MG tablet Take 3.125 mg by mouth 2 (two) times daily with meals.      citalopram (CELEXA) 20 MG tablet Take 20 mg by mouth once daily.        " docusate sodium (COLACE) 100 MG capsule Take 100 mg by mouth.        HUMALOG KWIKPEN 100 unit/mL InPn pen       insulin aspart protamine-insulin aspart (NOVOLOG 70/30) 100 unit/mL (70-30) InPn Inject 100 Units into the skin 3 (three) times daily.        insulin detemir (LEVEMIR) 100 unit/mL injection Inject 100 Units into the skin every evening.        insulin glargine (LANTUS) 100 unit/mL Crtg Inject 60 Units into the skin once daily.        ipratropium (ATROVENT) 0.03 % nasal spray 2 sprays by Nasal route 2 (two) times daily.      JUBLIA 10 % Martin       magnesium oxide-Mg AA chelate (MG-PLUS-PROTEIN) 133 mg Tab Take 250 mg by mouth.      melatonin 1 mg Tab Take by mouth.        metformin (GLUCOPHAGE) 500 MG tablet Take 500 mg by mouth before breakfast.        NOVOLOG FLEXPEN 100 unit/mL InPn pen       nystatin (MYCOSTATIN) 100,000 unit/mL suspension 100,000 mg.      omeprazole (PRILOSEC) 40 MG capsule Take 1 capsule (40 mg total) by mouth once daily. 30 capsule 10    oxycodone-acetaminophen (PERCOCET) 5-325 mg per tablet Take 1 tablet by mouth every 6 (six) hours as needed.        potassium chloride (KLOR-CON) 20 mEq packet Take 20 mEq by mouth once daily. 30 tablet 10    pyridostigmine (MESTINON) 60 mg Tab Take 0.5 tablets (30 mg total) by mouth 4 (four) times daily as needed (can take 2-4 times per day as needed). 120 tablet 5    TRESIBA FLEXTOUCH U-200 200 unit/mL (3 mL) InPn       vitamin D 185 MG Tab Take 185 mg by mouth once daily.        zolpidem (AMBIEN) 10 mg Tab 10 mg.       No current facility-administered medications for this visit.        Allergies:  No Known Allergies    PMHx:  Past Medical History:   Diagnosis Date    Cancer     Diabetes mellitus     Headache(784.0)     HEARING LOSS     History of thymectomy 7/16/2012    Memory loss     Myasthenia exacerbation     Myasthenia gravis with acute exacerbation     Myasthenia gravis with thymoma     Thymoma     Vision  abnormalities     Stargadtch's disease/Macular degeneration -causes vision loss bilaterall, right worse than left      Past Surgical History:   Procedure Laterality Date    COLONOSCOPY      thymus removal         Fhx:  Family History   Problem Relation Age of Onset    Cancer Sister         breast    Diabetes Sister        Shx:   Social History     Socioeconomic History    Marital status: Single     Spouse name: Not on file    Number of children: Not on file    Years of education: Not on file    Highest education level: Not on file   Social Needs    Financial resource strain: Not on file    Food insecurity - worry: Not on file    Food insecurity - inability: Not on file    Transportation needs - medical: Not on file    Transportation needs - non-medical: Not on file   Occupational History    Not on file   Tobacco Use    Smoking status: Former Smoker     Packs/day: 0.50     Years: 10.00     Pack years: 5.00     Types: Cigarettes     Last attempt to quit: 2011     Years since quittin.0   Substance and Sexual Activity    Alcohol use: No    Drug use: No    Sexual activity: Not on file   Other Topics Concern    Not on file   Social History Narrative    Not on file       ROS:   Review of Systems   Constitutional:+  for malaise/fatigue. Negative for weight loss.   HENT: Negative for hearing loss.   Eyes: + for blurred vision (progressive macular degeneration) and neg for double vision.   Respiratory: + for shortness of breath on exertion and neg for stridor.   Cardiovascular: Negative for chest pain and palpitations.   Gastrointestinal: Negative for nausea, vomiting and constipation.   Genitourinary: Negative for frequency. Negative for urgency.   Musculoskeletal: Negative for joint pain. Negative for myalgias and falls.   Skin: Negative for rash.   Neurological: Negative for dizziness and tremors. Negative for focal weakness and seizures.   Endo/Heme/Allergies: Does not bruise/bleed easily.  "  Psychiatric/Behavioral: Negative for memory loss. Negative for depression and hallucinations. The patient is not nervous/anxious.        PHYSICAL EXAM:   BP (!) 160/81   Pulse 61   Ht 5' 9" (1.753 m)   Wt 74 kg (163 lb 2.3 oz)   BMI 24.09 kg/m²    Neurologic Exam:     Mental status:  -- Facial Expression: normal   -- Affect: full   -- Orientation to time & place: Oriented to time, place, person and situation   -- Attention & concentration: Normal attention span and concentration   -- Memory: Recent and remote memory intact  -- Language: Spontaneous, fluent; able to repeat and name objects  -- Fund of knowledge: Aware of current events   -- Speech: some slurred speech noted as we spoke more    Cranial nerves:  -- II: pupils equal, round, reactive to light and accommodation, Visual fields are full to confrontation.    -- III,IV,VI: extraocular muscles EOMI: she has bilateral ptosis on upgaze, immediate  -- V: facial light touch sensation normal   -- VII: upper facial muscle function normal bilaterally; bilateral buccal weakness  -- V & VII: Corneal reflex present bilaterally   -- VIII: Response to voice.  Hearing is normal bilaterally.    -- IX: soft palate elevation normal bilaterally  -- X: Gag reflex not tested  -- XI: trapezius strength normal bilaterally, XI: sternocleidomastoid strength normal bilaterally  -- XII: tongue normal, midline     Motor examination:   -- Muscle tone: Normal in all 4 extremities   -- Muscle Bulk: Normal in all 4 extremities normal   -- Muscle strength: 5/5 in the upper and lower extremities bilaterally  -- Pronator drift: No  -- Fasciculations: No   -- Tremors: No     Sensory examination:  -- Intact to pinprick, light touch, temperature, vibration and proprioception in the upper and lower extremities bilaterally.   -- Romberg is negative.     Deep tendon reflexes:  -- 2+ bilateral biceps, triceps, patella and ankles   -- Toes are down going bilaterally.     Cerebellar and " Coordination:   Gait: Normal heel, toe, tandem, and casual gait   Finger-nose: No dysmetria  Rapid Alternating Movements (pronation/supination): R normal; L normal            ASSESSMENT:     63 year old female with ab+ MG s/p thymectomy presents to clinic due to increased generalized weakness, inc bilateral ptosis, intermittent slurring of speech and issue with swallowing. Patient currently taking prednisone 40mg qD and mestinon 30 mg bid. Was previously on imuran until she had BM suppression issues. Has used IVIG and PLEX for rescue before with mild-moderate results.        Problem List Items Addressed This Visit     Myasthenia gravis, AChR antibody positive - Primary    Overview     Ab+, thymoma positive, s/p resection, generalized MG, diagnosed 2011  Prednisone, mestinon  Rescue: Plex, IVIG; both moderately helpful  Prior: Imuran -> marrow suppression         Current Assessment & Plan     Increasing buccal and respiratory weakness. Will order IVIG. Pt prefers to do close to home. Her doctor is willing to order it, but would like our protocol. Will ask nurse to send to [facility] [place], [place].     Follow up, resident clinic, in 6 weeks               Glenn Chaudhari MD, LUCAS, FAAN  Department of Neurology   [facility] [place], [place]

## 2018-08-23 NOTE — LETTER
August 23, 2018      Manish Anderson MD  [address] [facility]  [place] [address]           [facility]  [address]  [place] [address]  Phone: [phone]  Fax: [phone]          Patient: Louisa Quarles   MR Number: [id number]   YOB: 1955   Date of Visit: 8/23/2018       Dear Dr. Manish Anderson:    Thank you for referring Louisa Quarles to me for evaluation. Attached you will find relevant portions of my assessment and plan of care.    If you have questions, please do not hesitate to call me. I look forward to following Louisa Quarles along with you.    Sincerely,    Adrian Chaudhari MD    Enclosure  CC:  No Recipients    If you would like to receive this communication electronically, please contact [email or web address] or [phone] to request more information on [employer] access.    For providers and/or their staff who would like to refer a patient to [facility], please contact us through our one-stop-shop provider referral line, [facility], at [phone].    If you feel you have received this communication in error or would no longer like to receive these types of communications, please e-mail [email or web address]

## 2018-08-23 NOTE — ASSESSMENT & PLAN NOTE
Increasing buccal and respiratory weakness. Will order IVIG. Pt prefers to do close to home. Her doctor is willing to order it, but would like our protocol. Will ask nurse to send to [facility] [place].     Follow up, resident clinic, in 6 weeks

## 2018-08-24 ENCOUNTER — TELEPHONE (OUTPATIENT)
Dept: NEUROLOGY | Facility: CLINIC | Age: 63
End: 2018-08-24

## 2018-08-24 NOTE — TELEPHONE ENCOUNTER
----- Message from Elvia Moraes sent at 8/24/2018  2:12 PM CDT -----  Patient Returning Call from [facility]    Who Left Message for Patient:Wanda  Communication Preference:pt@ [phone]  Additional Information:says she is returning Wanda's call regarding setting up home care for the patient.

## 2018-09-27 DIAGNOSIS — G70.00 MYASTHENIA GRAVIS, ACHR ANTIBODY POSITIVE: Primary | ICD-10-CM

## 2018-09-27 RX ORDER — PREDNISONE 10 MG/1
10 TABLET ORAL DAILY
Qty: 10 TABLET | Refills: 1 | Status: SHIPPED | OUTPATIENT
Start: 2018-09-27 | End: 2018-10-07

## 2018-09-27 NOTE — TELEPHONE ENCOUNTER
Mrs. Jasso doing well. Her symptoms are under control at this time. She is trying to stay active around the house and exercise a little. But experiences weakness when she does too much. Patient requesting Prednisone 10mg PO Daily.

## 2018-09-29 DIAGNOSIS — G70.00 MYASTHENIA GRAVIS, ACHR ANTIBODY POSITIVE: ICD-10-CM

## 2018-09-29 RX ORDER — PREDNISONE 10 MG/1
TABLET ORAL
Qty: 120 TABLET | Refills: 1 | Status: CANCELLED | OUTPATIENT
Start: 2018-09-29

## 2018-10-01 DIAGNOSIS — G70.00 MYASTHENIA GRAVIS, ACHR ANTIBODY POSITIVE: ICD-10-CM

## 2018-10-01 RX ORDER — PREDNISONE 10 MG/1
10 TABLET ORAL DAILY
Qty: 10 TABLET | Refills: 1 | OUTPATIENT
Start: 2018-10-01 | End: 2018-10-11

## 2018-10-03 ENCOUNTER — TELEPHONE (OUTPATIENT)
Dept: NEUROLOGY | Facility: CLINIC | Age: 63
End: 2018-10-03

## 2018-10-03 NOTE — TELEPHONE ENCOUNTER
----- Message from Candelario Garces sent at 10/3/2018 10:48 AM CDT -----  Contact: Patient @ [phone]  Patient is requesting a return call regarding the medication (predniSONE (DELTASONE) 10 MG tablet ) pls return call

## 2018-10-09 DIAGNOSIS — G70.00 MYASTHENIA GRAVIS, ACHR ANTIBODY POSITIVE: Primary | ICD-10-CM

## 2018-10-09 RX ORDER — PREDNISONE 10 MG/1
TABLET ORAL
Qty: 120 TABLET | Refills: 0 | Status: SHIPPED | OUTPATIENT
Start: 2018-10-09 | End: 2018-11-06 | Stop reason: SDUPTHER

## 2018-10-10 NOTE — TELEPHONE ENCOUNTER
Message from MAURILIO Vanegas with [employer]:    Hi!  Quick update: Ms Mccormick auth just came back approved. I asked [place] to keep me in the loop once they finish this part and talk to the patient. Ill let you know what theyre next update.     Patient informed of approval by RN Coordinator.

## 2018-10-24 ENCOUNTER — TELEPHONE (OUTPATIENT)
Dept: NEUROLOGY | Facility: CLINIC | Age: 63
End: 2018-10-24

## 2018-10-24 NOTE — TELEPHONE ENCOUNTER
----- Message from Justin Ospina sent at 10/24/2018  1:19 PM CDT -----  Needs Advice    Reason for call: Pt is asking to speak w/ Wanda to confirm if she needs to get a flu shot        Communication Preference: [phone]    Additional Information:

## 2018-11-06 DIAGNOSIS — G70.00 MYASTHENIA GRAVIS, ACHR ANTIBODY POSITIVE: ICD-10-CM

## 2018-11-07 RX ORDER — PREDNISONE 10 MG/1
TABLET ORAL
Qty: 120 TABLET | Refills: 3 | Status: SHIPPED | OUTPATIENT
Start: 2018-11-07 | End: 2019-05-03 | Stop reason: SDUPTHER

## 2018-12-18 ENCOUNTER — TELEPHONE (OUTPATIENT)
Dept: NEUROLOGY | Facility: CLINIC | Age: 63
End: 2018-12-18

## 2018-12-18 NOTE — TELEPHONE ENCOUNTER
----- Message from Candelario Garces sent at 12/18/2018 11:41 AM CST -----  Contact: Patient @ [phone]  Patient requesting a return call regarding medication, pls call to discuss

## 2018-12-18 NOTE — TELEPHONE ENCOUNTER
Patient would like to know if she needs to adjust her prednisone since she started IVIG treatments.

## 2018-12-27 ENCOUNTER — TELEPHONE (OUTPATIENT)
Dept: NEUROLOGY | Facility: CLINIC | Age: 63
End: 2018-12-27

## 2018-12-27 NOTE — TELEPHONE ENCOUNTER
Mrs. Jasso is currently taking Prednisone 30mg daily. Concerned with elevated blood glucose levels with prednisone treatments. Have appt with PCP in the upcoming week.      Patient just finished second rounds IVIG via [place]. Next round of IVIG is February 2019. Mrs. Jasso experiencing generalized weakness throughout the day. Upon waking notices arms and legs are weak accompanied by muscle cramping that last intermittent throughout the day. . Patient c/o difficulty swallowing and clearing secretions since August 2018.     Mrs. Jasso inquired of the purpose of IVIG and mechanism of action to help MG related symptoms. RN provided overview of IVIG indication treatment of MG. Informed patient to discuss in more detail with MD during upcoming visit.     Nurse discuss ways to decrease stress by involving self in activities that patient enjoys. 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 She finds enjoyment in low impact exercising.

## 2018-12-28 NOTE — TELEPHONE ENCOUNTER
LVM to inform patient of Dr. Chaudhari's recommendations. Possibility Prednisone may be the cause of the weakness. Also, patient should receive evaluation by Dr. Perez or Dr. Leslie soon.     Upcoming appt scheduled with Dr. Montilla. Advised patient to contact Tigre CHAWLA MA to reschedule sooner appt with either of the providers recommended by Dr. PENA if there's availability.

## 2019-01-07 ENCOUNTER — TELEPHONE (OUTPATIENT)
Dept: NEUROLOGY | Facility: CLINIC | Age: 64
End: 2019-01-07

## 2019-01-07 NOTE — TELEPHONE ENCOUNTER
----- Message from Elvia Moraes sent at 1/7/2019  9:05 AM CST -----  Contact: Nahomi Almaraz( [place] [facility]) @ [phone]  Calling for a new prescription on medication: Gammagard 700mg per mg/kg everyday x 3 days, every 8 weeks, (Dr. Chaudhari's patient), please call.

## 2019-01-08 ENCOUNTER — OFFICE VISIT (OUTPATIENT)
Dept: NEUROLOGY | Facility: CLINIC | Age: 64
End: 2019-01-08
Payer: MEDICARE

## 2019-01-08 VITALS
WEIGHT: 166.44 LBS | BODY MASS INDEX: 24.65 KG/M2 | SYSTOLIC BLOOD PRESSURE: 171 MMHG | HEART RATE: 90 BPM | HEIGHT: 69 IN | DIASTOLIC BLOOD PRESSURE: 97 MMHG

## 2019-01-08 DIAGNOSIS — G70.00 MYASTHENIA GRAVIS, ACHR ANTIBODY POSITIVE: ICD-10-CM

## 2019-01-08 DIAGNOSIS — D49.89 THYMOMA: ICD-10-CM

## 2019-01-08 PROCEDURE — 99215 PR OFFICE/OUTPT VISIT, EST, LEVL V, 40-54 MIN: ICD-10-PCS | Mod: S$PBB,GC,, | Performed by: STUDENT IN AN ORGANIZED HEALTH CARE EDUCATION/TRAINING PROGRAM

## 2019-01-08 PROCEDURE — 99215 OFFICE O/P EST HI 40 MIN: CPT | Mod: S$PBB,GC,, | Performed by: STUDENT IN AN ORGANIZED HEALTH CARE EDUCATION/TRAINING PROGRAM

## 2019-01-08 PROCEDURE — 99215 OFFICE O/P EST HI 40 MIN: CPT | Mod: PBBFAC | Performed by: STUDENT IN AN ORGANIZED HEALTH CARE EDUCATION/TRAINING PROGRAM

## 2019-01-08 PROCEDURE — 99999 PR PBB SHADOW E&M-EST. PATIENT-LVL V: CPT | Mod: PBBFAC,GC,, | Performed by: STUDENT IN AN ORGANIZED HEALTH CARE EDUCATION/TRAINING PROGRAM

## 2019-01-08 PROCEDURE — 99999 PR PBB SHADOW E&M-EST. PATIENT-LVL V: ICD-10-PCS | Mod: PBBFAC,GC,, | Performed by: STUDENT IN AN ORGANIZED HEALTH CARE EDUCATION/TRAINING PROGRAM

## 2019-01-08 NOTE — PATIENT INSTRUCTIONS
Alternate prednisone as follows:  - Take prednisone 30mg on odd days of the month  - Take prednisone 20mg on even days of the month  - If you are tolerating this after 8 weeks, can decrease to 20mg every day  - If you have worsening symptoms (eyelid droop, swallowing difficulty, shortness of breath) increase back to 30mg every day    Increase IVIG to every 6 weeks

## 2019-01-08 NOTE — PROGRESS NOTES
"Patient Name: Louisa Quarles  MRN: [id number]    CC: Myasthenia gravis    Interval history: Louisa Quarles is a 63 y.o. female who returns for follow up of Myasthenia gravis. She is new to me, but has previously been following with Dr. Chaudhari. Since her last visit, she reports generalized fatigue that is worse in the morning. She does not sleep well at night, which she thinks is related to stress. She denies diplopia, ptosis or shortness of breath. She occasionally coughs when eating solids, but denies choking. She is taking mestinon 4 times a day and reports intermittent diarrhea.    DX: Generalized. Ab+ MG, dx'ed 2011  Thymic status- s/p thymectomy November 2011  Maintenance: Prednisone 30 mg QoD, Mestinon 30 mg qid; IVIG q8 weeks.   *Prednisone 40mg/d started Feb 2/18 2014; titrated since then, increased again in August 2018 with flare   Last dose change: August 2018  Rescue: IVIG and PLEX in past- PLEX, last one Feb 2014 - moderately helpful  Prior immunemodulatory agents: Prior medications:   *Imuran started 50mg/d 3/31/14, increased to 50mg bid 7/28; increased to 100mg/50mg 10/6/14; stopped 5/15 d/t marrow suppression     Interval hx 8/23/18:  Has been having more symptoms over the last few months. We increased her prednisone in July from qOD to daily without much benefit. Increased again in Aug to 40mg/d, but not helping much. Still having difficulty with chewing, swallowing, nasal speech and intermittent SOB and YU.   - IVIG started.  - Prednisone at 30mg daily    7/30/18 - Pt called complaining of worsening ptosis, dysphagia, YU. Prednisone increased to 40mg daily.    Interval Hx 7/10/18: Complaints of generalized weakness including in proximal muscles in upper and lower extremities. Having increased bilateral ptosis. HAs been noticing weakness for a couple of months and is continuing to progress. Feels like her talking is "getting heavy." Has trouble swallowing food associated with chewing " "fatigue. Patient also with Stargardt disease- progressive macular degeneration. Denies diplopia. Patient endorsing SOB when doing things. Patient states her "head is heavy." Currently taking prednisone 6 mg QoD. Stopped imuran in the past due to BM suppression. Taking mestinon 1/2 tablet twice a day (30 mg bid). Patient takes medication with breakfast and supper (almost 12 hrs apart 7a/7p, give or take). Does not appreciate worsening of her symptoms as her day progresses.   - Prednisone increased to 6mg DAILY (from every other day)  - Mestinon increased to 2-4 times PRN    Interval history 2/27/18  Dropped prednisone to 8mg/d at 7/17 visit.  Now taking it 8mg qOD.   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 Currently on Prednisone 9 mg qd (dec from 10 last visit) and Mestinon 30 mg bid.     Stopped omeprazole and notes swallowing has much improved. Still does have occasional difficulty though.     She is eager to stop some of her many meds, feels like they cause side effects. Last a1c was 8.     Occasionally feels generalized weakness but still able to perform all daily activities. Has some instances of feeling off balance which she attributes to her neuropathy. Denies ptosis, diplopia. Denies sob/orthopnea.       Interval hx 9/9/16:  Louisa Quarles is a 61 female with DMII and Ab+ oculobulbar MG dx'ed 2011, s/p thymectomy 2011. MG maintained on Prednisone 10 mg qOD (since 1/2016) and Mestinon 30 mg tid. MG overall stable. Does note long hx of sensation of food getting stuck when swallowing. States she is seeing her PCP soon for this and he may order endoscopy. When she takes her time chewing, this sensation is decreased. Has noticed when she begins walking down the stairs has to hold on to rail. Recent A1c of 8.6, PCP in process of better management of DM. Denies diplopia/ptosis, SOB, " "extremity weakness.    HPI 6/30/16:  Louisa Quarles is a 60 yo female with DMII and Ab+ oculobulbar MG dx'ed 2011 with hx of thymoma s/p thymectomy 11/2011. States she is doing well, does have occasional generalized weakness/ muscle aches. Notices a little difficulty with balance, denies falls. MG is well maintained on Prednisone 10 mg qOD (since 1/2016) and Mestinon 30 mg BID. D/C'ed Imuran 5/2015 due to marrow suppression. Has had both IVIG and PLEX in the past with minimal improvement. Denies SOB, diplopia/ptosis, dysarthria.     Interval 1/15/16: Doing well. No diplopia, ptosis, dysarthria or weakness. Prednisone 5mg/d, began last April.      Interval 8/19/15: Stopped imuran in May d/t marrow suppression. She was supposed to call me in June for follow up, but did not do so. She returns today in follow up.      Currently doing well. Minor symptoms.      Interval 4/6/15:  Doing well. No difficulty with diplopia, ptosis, dysarthria or dysphagia. No breathing issues.      Interval 1/5/15:  No problems chewing and swallowing. Vision has been 'okay'. No ptosis or diplopia. Sugar was better after we decreased the prednisone, but here lately has been a little higher.      Interval 10/6/14: No ptosis, no diplopia, no chewing and swallowing problems. No peripheral weakness. Has some blurry vision, but has been to ophtho and no cataracts. Sugars are running a little high - 300s are the norm.      Interval history: Began prednisone 40mg/d last month, along with mestinon. She's doing better in regards to ptosis, diplopia, chewing and swallowing. She feels good, but says that her vision is getting "bad". She has trouble with reading. She will make an eye appointment this week. Her sugar has been running a little high - rarely 300s - she's trying to control her diet and take her meds as prescribed.      Interval history: Ab+ MG s/p thymectomy, recently admitted to hospital for exacerbation and s/p PLEX and initiation of " "prednisone, returns today two weeks post-discharge. She was only provided with ten days of prednisone on discharge and she did not call the clinic to obtain more medication. She is starting to have more trouble swallowing in the last week or so. Her speech is also rather dysarthric.      HPI: Returns today. Her MG has been slowly getting worse over the last few months. More trouble with speech, swallowing and arm > leg weakness. Right eye ptosis.      Received IVIg Dec 26-30. She says that it "helped some", but did not improve her symptoms as much as she had hoped.      She requires liquids to help her swallow solid food b/c it "gets hung up" if she doesn't. She is thickening her liquids, not b/c she's choking, but b/c she's worried about choking. She doesn't have jorge luis orthopnea.      She has resumed taking mestinon, but she doesn't see that it's helping.      Her head is "heavy" lately - having trouble balancing it on her neck.      Background:  Louisa Quarles is a 60 y.o. female with Ab+, oculobulbar MG, diagnosed in 2011 after presenting with bulbar symptoms (ptosis, diplopia, dsyarthria). She responded to IVIg and underwent a workup, which revealed a thymoma. She is s/p thymectomy in November of 2011 followed by adjuvant radiation therapy. She has been maintained on Imuran 50mg/day, mestinon 30mg tid. She has been on prednisone in the past, but it was discontinued in December 2012.       ROS:   Review of Systems   Constitutional: +malaise/fatigue. Negative for weight loss.   HENT: Negative for hearing loss.   Eyes: Negative for blurred vision and double vision.   Respiratory: Negative for shortness of breath and stridor.   Cardiovascular: Negative for chest pain and palpitations.   Gastrointestinal: Negative for nausea, vomiting and constipation.   Genitourinary: Negative for frequency. Negative for urgency.   Musculoskeletal: Negative for joint pain. Negative for myalgias and falls.   Skin: Negative for " rash.   Neurological: Negative for dizziness and tremors. Negative for focal weakness and seizures.   Endo/Heme/Allergies: Does not bruise/bleed easily.   Psychiatric/Behavioral: Negative for memory loss. Negative for depression and hallucinations. The patient is not nervous/anxious.      Past Medical History  Past Medical History:   Diagnosis Date    Cancer     Diabetes mellitus     Headache(784.0)     HEARING LOSS     History of thymectomy 7/16/2012    Memory loss     Myasthenia exacerbation     Myasthenia gravis with acute exacerbation     Myasthenia gravis with thymoma     Thymoma     Vision abnormalities     Stargadtch's disease/Macular degeneration -causes vision loss bilaterall, right worse than left        Medications    Current Outpatient Medications:     ACCU-CHEK SHAKIRA PLUS METER Misc, , Disp: , Rfl:     ACCU-CHEK SHAKIRA PLUS Strp, , Disp: , Rfl:     ACCU-CHEK MULTICLIX LANCET lancets, , Disp: , Rfl:     alpha lipoic acid 600 mg Cap, Take by mouth once daily., Disp: , Rfl:     aspirin 81 MG chewable tablet, Take 81 mg by mouth once daily.  , Disp: , Rfl:     atorvastatin (LIPITOR) 20 MG tablet, 20 mg., Disp: , Rfl:     azathioprine (IMURAN) 50 mg Tab, Take 2 pills in the morning and one pill in the evening., Disp: 270 tablet, Rfl: 3    AZELASTINE/FLUTICASONE (DYMISTA NASL), by Nasal route., Disp: , Rfl:     calcium carbonate (OS-MAE) 600 mg (1,500 mg) Tab, Take 600 mg by mouth 2 (two) times daily with meals.  , Disp: , Rfl:     calcium citrate-vitamin D3 315-200 mg (CITRACAL+D) 315-200 mg-unit per tablet, Take by mouth 2 (two) times daily., Disp: , Rfl:     carvedilol (COREG) 3.125 MG tablet, Take 3.125 mg by mouth 2 (two) times daily with meals., Disp: , Rfl:     citalopram (CELEXA) 20 MG tablet, Take 20 mg by mouth once daily.  , Disp: , Rfl:     docusate sodium (COLACE) 100 MG capsule, Take 100 mg by mouth.  , Disp: , Rfl:     HUMALOG KWIKPEN 100 unit/mL InPn pen, , Disp: , Rfl:      insulin aspart protamine-insulin aspart (NOVOLOG 70/30) 100 unit/mL (70-30) InPn, Inject 100 Units into the skin 3 (three) times daily.  , Disp: , Rfl:     insulin detemir (LEVEMIR) 100 unit/mL injection, Inject 100 Units into the skin every evening.  , Disp: , Rfl:     insulin glargine (LANTUS) 100 unit/mL Crtg, Inject 60 Units into the skin once daily.  , Disp: , Rfl:     ipratropium (ATROVENT) 0.03 % nasal spray, 2 sprays by Nasal route 2 (two) times daily., Disp: , Rfl:     JUBLIA 10 % Martin, , Disp: , Rfl:     magnesium oxide-Mg AA chelate (MG-PLUS-PROTEIN) 133 mg Tab, Take 250 mg by mouth., Disp: , Rfl:     melatonin 1 mg Tab, Take by mouth.  , Disp: , Rfl:     metformin (GLUCOPHAGE) 500 MG tablet, Take 500 mg by mouth before breakfast.  , Disp: , Rfl:     NOVOLOG FLEXPEN 100 unit/mL InPn pen, , Disp: , Rfl:     nystatin (MYCOSTATIN) 100,000 unit/mL suspension, 100,000 mg., Disp: , Rfl:     omeprazole (PRILOSEC) 40 MG capsule, Take 1 capsule (40 mg total) by mouth once daily., Disp: 30 capsule, Rfl: 10    oxycodone-acetaminophen (PERCOCET) 5-325 mg per tablet, Take 1 tablet by mouth every 6 (six) hours as needed.  , Disp: , Rfl:     potassium chloride (KLOR-CON) 20 mEq packet, Take 20 mEq by mouth once daily., Disp: 30 tablet, Rfl: 10    predniSONE (DELTASONE) 10 MG tablet, TAKE 4 TABLETS BY MOUTH ONCE DAILY, Disp: 120 tablet, Rfl: 3    pyridostigmine (MESTINON) 60 mg Tab, Take 0.5 tablets (30 mg total) by mouth 4 (four) times daily as needed (can take 2-4 times per day as needed)., Disp: 120 tablet, Rfl: 5    TRESIBA FLEXTOUCH U-200 200 unit/mL (3 mL) InPn, , Disp: , Rfl:     vitamin D 185 MG Tab, Take 185 mg by mouth once daily.  , Disp: , Rfl:     zolpidem (AMBIEN) 10 mg Tab, 10 mg., Disp: , Rfl:     Allergies  Review of patient's allergies indicates:  No Known Allergies    Social History  Social History     Socioeconomic History    Marital status: Single     Spouse name: Not on file  "   Number of children: Not on file    Years of education: Not on file    Highest education level: Not on file   Social Needs    Financial resource strain: Not on file    Food insecurity - worry: Not on file    Food insecurity - inability: Not on file    Transportation needs - medical: Not on file    Transportation needs - non-medical: Not on file   Occupational History    Not on file   Tobacco Use    Smoking status: Former Smoker     Packs/day: 0.50     Years: 10.00     Pack years: 5.00     Types: Cigarettes     Last attempt to quit: 2011     Years since quittin.4   Substance and Sexual Activity    Alcohol use: No    Drug use: No    Sexual activity: Not on file   Other Topics Concern    Not on file   Social History Narrative    Not on file       Family History  Family History   Problem Relation Age of Onset    Cancer Sister         breast    Diabetes Sister        Physical Exam  BP (!) 171/97   Pulse 90   Ht 5' 9" (1.753 m)   Wt 75.5 kg (166 lb 7.2 oz)   BMI 24.58 kg/m²       Constitutional  Well-developed, well-nourished, appears stated age   Neurological    * Mental status      - Orientation  Oriented to person, place, time, and situation     - Attention/concentration  Attentive, vigilant during exam     - Executive  Well-organized thoughts     - Other     * Cranial nerves       - CN II  PERRL     - CN III, IV, VI  Extraocular movements full, normal pursuits and saccades     - CN V  Sensation V1 - V3 intact     - CN VII  Face strong and symmetric bilaterally     - CN VIII  Hearing intact bilaterally     - CN IX, X  Palate raises midline and symmetric     - CN XI  SCM and trapezius 5/5 bilaterally     - CN XII  Tongue midline   * Motor  Muscle bulk normal, strength 5/5 throughout. No fatiguability   * Sensory   Intact to temperature and vibration throughout   * Coordination  No dysmetria with finger-to-nose   * Gait  Normal casual gait   * Deep tendon reflexes  2+ and symmetric " throughout       Lab and Test Results  Lab Results   Component Value Date    HGBA1C 5.7 01/09/2014     Lab Results   Component Value Date    TSH 1.379 01/09/2014       Assessment and Plan    Louisa Quarles is a 63 y.o. female with antibody positive MG, currently maintained on prednisone and IVIG with mestinon PRN. While she reports generalized fatigue, what she describes sounds more likely to be related to insomnia rather than MG. My impression is that her MG is stable. She would like to decrease prednisone, so we will taper down to 20mg daily over 8 weeks. In the meantime will increase IVIG frequency to every 6 weeks to prevent any relapse. RTC in 2 months.      Problem List Items Addressed This Visit        Neuro    Myasthenia gravis, AChR antibody positive    Overview     Ab+, thymoma positive, s/p resection (2011), generalized MG, diagnosed 2011  Prednisone, mestinon, IVIG  Rescue: Plex, IVIG; both moderately helpful  Prior: Imuran -> marrow suppression         Current Assessment & Plan     -- symptoms stable  -- decrease prednisone: 20mg alternating with 30mg every other day. After 8 weeks decrease to 20mg daily  -- Change IVIG Gammagard 700mg per mg/kg everyday x 3 days from q8w to q6 weeks  -- RTC in 2 months            Oncology    Thymoma    Current Assessment & Plan     -- s/p thymectomy 2011                 Marleni Chappell MD  Neurology Resident   [facility] [address] [place] [address]  Pager: [phone]

## 2019-01-09 PROBLEM — D49.89 THYMOMA: Status: ACTIVE | Noted: 2019-01-09

## 2019-01-09 NOTE — ASSESSMENT & PLAN NOTE
-- symptoms stable  -- decrease prednisone: 20mg alternating with 30mg every other day. After 8 weeks decrease to 20mg daily  -- Change IVIG Gammagard 700mg per mg/kg everyday x 3 days from q8w to q6 weeks  -- RTC in 2 months

## 2019-01-10 ENCOUNTER — TELEPHONE (OUTPATIENT)
Dept: NEUROLOGY | Facility: CLINIC | Age: 64
End: 2019-01-10

## 2019-01-10 NOTE — TELEPHONE ENCOUNTER
----- Message from Indigo Lopez sent at 1/10/2019  1:27 PM CST -----  Contact: Nahomi Tinajero    [phone]  Returning Wanda's call concerning pts order.  Will need Dr Montilla medical license and expiration date.

## 2019-01-11 NOTE — TELEPHONE ENCOUNTER
On 1/10/19, verbal order provided to Nahomi, Pharmacist w/ [facility] for change in IVIG frequency to Q 6 weeks.     Spoke with Nahomi that noted the IVIG orders are being placed under Dr. Perez, since she did not have the expiration date for Dr. Montilla.     Sent order for Dr. Perez to sign on yesterday, 1/10/19.

## 2019-02-26 ENCOUNTER — TELEPHONE (OUTPATIENT)
Dept: NEUROLOGY | Facility: CLINIC | Age: 64
End: 2019-02-26

## 2019-03-01 ENCOUNTER — TELEPHONE (OUTPATIENT)
Dept: NEUROLOGY | Facility: CLINIC | Age: 64
End: 2019-03-01

## 2019-03-01 NOTE — TELEPHONE ENCOUNTER
----- Message from Indigo Lopez sent at 2/28/2019  4:06 PM CST -----  Contact: Fiorella Benavidez    [phone]  Calling to see if you received the prior auth paper work for pts prescription of gamma guard.  Pls call.

## 2019-03-26 ENCOUNTER — TELEPHONE (OUTPATIENT)
Dept: NEUROLOGY | Facility: CLINIC | Age: 64
End: 2019-03-26

## 2019-05-03 DIAGNOSIS — G70.00 MYASTHENIA GRAVIS, ACHR ANTIBODY POSITIVE: ICD-10-CM

## 2019-05-03 RX ORDER — PREDNISONE 10 MG/1
20 TABLET ORAL DAILY
Qty: 180 TABLET | Refills: 0 | Status: SHIPPED | OUTPATIENT
Start: 2019-05-03 | End: 2019-05-07

## 2019-05-03 NOTE — TELEPHONE ENCOUNTER
----- Message from Candelario Garces sent at 5/3/2019  9:33 AM CDT -----  Contact: Patient   Rx Refill/Request     Is this a Refill or New Rx:  yds  Rx Name and Strength:  predniSONE (DELTASONE) 10 MG tablet    Preferred Pharmacy with phone number:     [id number] - [place], [place] - [address] [place] [address]  Phone: [phone] Fax: [phone]

## 2019-05-07 ENCOUNTER — TELEPHONE (OUTPATIENT)
Dept: NEUROLOGY | Facility: CLINIC | Age: 64
End: 2019-05-07

## 2019-05-07 DIAGNOSIS — G70.00 MYASTHENIA GRAVIS, ACHR ANTIBODY POSITIVE: ICD-10-CM

## 2019-05-07 RX ORDER — PREDNISONE 20 MG/1
20 TABLET ORAL DAILY
Qty: 90 TABLET | Refills: 1 | Status: SHIPPED | OUTPATIENT
Start: 2019-05-07 | End: 2019-05-14

## 2019-05-07 NOTE — TELEPHONE ENCOUNTER
----- Message from Jutsin Ospina sent at 5/6/2019  3:19 PM CDT -----  Needs Advice    Reason for call: Pt states pharmacy below does not predniSONE (DELTASONE) 10 MG tablet in stock, pt requesting 20 MG        Communication Preference: [phone]    Additional Information:    [facility] - [place], [place] - [address] [place] [address]  Phone: [phone] Fax: [phone]

## 2019-05-07 NOTE — TELEPHONE ENCOUNTER
05/07/2019  1:19 PM    Sent prescription for prednisone 20 mg Qdaily as requested.  Has appointment with Dr. Montilla on 5/14/19.    Alfredo Lucio MD

## 2019-05-14 ENCOUNTER — OFFICE VISIT (OUTPATIENT)
Dept: NEUROLOGY | Facility: CLINIC | Age: 64
End: 2019-05-14
Payer: MEDICARE

## 2019-05-14 VITALS
BODY MASS INDEX: 25.24 KG/M2 | DIASTOLIC BLOOD PRESSURE: 92 MMHG | WEIGHT: 170.44 LBS | HEART RATE: 96 BPM | SYSTOLIC BLOOD PRESSURE: 151 MMHG | HEIGHT: 69 IN

## 2019-05-14 DIAGNOSIS — Z79.52 ON PREDNISONE THERAPY: ICD-10-CM

## 2019-05-14 DIAGNOSIS — G70.00 MYASTHENIA GRAVIS, ACHR ANTIBODY POSITIVE: Primary | ICD-10-CM

## 2019-05-14 PROCEDURE — 99999 PR PBB SHADOW E&M-EST. PATIENT-LVL V: CPT | Mod: PBBFAC,GC,, | Performed by: STUDENT IN AN ORGANIZED HEALTH CARE EDUCATION/TRAINING PROGRAM

## 2019-05-14 PROCEDURE — 99214 PR OFFICE/OUTPT VISIT, EST, LEVL IV, 30-39 MIN: ICD-10-PCS | Mod: S$PBB,GC,, | Performed by: STUDENT IN AN ORGANIZED HEALTH CARE EDUCATION/TRAINING PROGRAM

## 2019-05-14 PROCEDURE — 99214 OFFICE O/P EST MOD 30 MIN: CPT | Mod: S$PBB,GC,, | Performed by: STUDENT IN AN ORGANIZED HEALTH CARE EDUCATION/TRAINING PROGRAM

## 2019-05-14 PROCEDURE — 99999 PR PBB SHADOW E&M-EST. PATIENT-LVL V: ICD-10-PCS | Mod: PBBFAC,GC,, | Performed by: STUDENT IN AN ORGANIZED HEALTH CARE EDUCATION/TRAINING PROGRAM

## 2019-05-14 PROCEDURE — 99215 OFFICE O/P EST HI 40 MIN: CPT | Mod: PBBFAC | Performed by: STUDENT IN AN ORGANIZED HEALTH CARE EDUCATION/TRAINING PROGRAM

## 2019-05-14 RX ORDER — FLUTICASONE PROPIONATE 50 MCG
SPRAY, SUSPENSION (ML) NASAL
Refills: 11 | COMMUNITY
Start: 2019-04-29

## 2019-05-14 RX ORDER — PREDNISONE 20 MG/1
10 TABLET ORAL DAILY
Qty: 90 TABLET | Refills: 1 | Status: SHIPPED | OUTPATIENT
Start: 2019-05-14 | End: 2019-08-29

## 2019-05-14 RX ORDER — TRAVOPROST OPHTHALMIC SOLUTION 0.04 MG/ML
SOLUTION OPHTHALMIC
COMMUNITY

## 2019-05-14 RX ORDER — PREDNISONE 5 MG/1
5 TABLET ORAL DAILY
Qty: 90 TABLET | Refills: 0 | Status: SHIPPED | OUTPATIENT
Start: 2019-05-14 | End: 2019-08-12 | Stop reason: SDUPTHER

## 2019-05-14 NOTE — LETTER
May 14, 2019        Manish Anderson MD  [address] [facility] [place] [address]             [facility]  [address]  [place] [address]  Phone: [phone]  Fax: [phone]   Patient: Louisa Quarles   MR Number: [id number]   YOB: 1955   Date of Visit: 5/14/2019       Dear Dr. Anderson:    Thank you for referring Louisa Quarles to me for evaluation. Attached you will find relevant portions of my assessment and plan of care.    If you have questions, please do not hesitate to call me. I look forward to following Louisa Quarles along with you.    Sincerely,      Marleni Chappell MD            CC  No Recipients    Enclosure

## 2019-05-14 NOTE — PROGRESS NOTES
Patient Name: Louisa Quarles  MRN: [id number]    CC: Myasthenia gravis    Interval history: Louisa Quarles is a 64 y.o. female who returns for follow up of Myasthenia gravis. Has good/bad days. On bad days she reports generalized fatigue, weakness that is worse in the morning and improves with moving. She reports recent stressors which she thinks has had an impact on her symptoms. She hasn't slept well. No shortness of breath. Occasionally coughs while eating, not often. No double vision, no ptosis.    DX: Generalized. Ab+ MG, dx'ed 2011  Thymic status- s/p thymectomy November 2011  Maintenance: Prednisone 20 mg QoD, Mestinon 30 mg tid; IVIG q6 weeks   *Prednisone 40mg/d started Feb 2/18 2014; titrated since then, increased again in August 2018 with flare. Titrated to 20mg daily 1/2019  Last dose change: August 2018  Rescue: IVIG and PLEX in past- PLEX, last one Feb 2014 - moderately helpful  Prior immunemodulatory agents: Prior medications:   *Imuran started 50mg/d 3/31/14, increased to 50mg bid 7/28; increased to 100mg/50mg 10/6/14; stopped 5/15 d/t marrow suppression     Initial consult with me 1/8/19: She is new to me, but has previously been following with Dr. Chaudhari. Since her last visit, she reports generalized fatigue that is worse in the morning. She does not sleep well at night, which she thinks is related to stress. She denies diplopia, ptosis or shortness of breath. She occasionally coughs when eating solids, but denies choking. She is taking mestinon 4 times a day and reports intermittent diarrhea.  - Prednisone titrated down to 20mg daily  - IVIG frequency increased to q6 weeks    Interval hx 8/23/18:  Has been having more symptoms over the last few months. We increased her prednisone in July from qOD to daily without much benefit. Increased again in Aug to 40mg/d, but not helping much. Still having difficulty with chewing, swallowing, nasal speech and intermittent SOB and YU.   - IVIG  "started.  - Prednisone at 30mg daily    7/30/18 - Pt called complaining of worsening ptosis, dysphagia, YU. Prednisone increased to 40mg daily.    Interval Hx 7/10/18: Complaints of generalized weakness including in proximal muscles in upper and lower extremities. Having increased bilateral ptosis. HAs been noticing weakness for a couple of months and is continuing to progress. Feels like her talking is "getting heavy." Has trouble swallowing food associated with chewing fatigue. Patient also with Stargardt disease- progressive macular degeneration. Denies diplopia. Patient endorsing SOB when doing things. Patient states her "head is heavy." Currently taking prednisone 6 mg QoD. Stopped imuran in the past due to BM suppression. Taking mestinon 1/2 tablet twice a day (30 mg bid). Patient takes medication with breakfast and supper (almost 12 hrs apart 7a/7p, give or take). Does not appreciate worsening of her symptoms as her day progresses.   - Prednisone increased to 6mg DAILY (from every other day)  - Mestinon increased to 2-4 times PRN    Interval history 2/27/18  Dropped prednisone to 8mg/d at 7/17 visit.  Now taking it 8mg qOD.   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 Currently on Prednisone 9 mg qd (dec from 10 last visit) and Mestinon 30 mg bid.     Stopped omeprazole and notes swallowing has much improved. Still does have occasional difficulty though.     She is eager to stop some of her many meds, feels like they cause side effects. Last a1c was 8.     Occasionally feels generalized weakness but still able to perform all daily activities. Has some instances of feeling off balance which she attributes to her neuropathy. Denies ptosis, diplopia. Denies sob/orthopnea.       Interval hx 9/9/16:  Louisa Quarles is a 61 female with DMII and Ab+ oculobulbar MG dx'ed 2011, s/p " thymectomy 2011. MG maintained on Prednisone 10 mg qOD (since 1/2016) and Mestinon 30 mg tid. MG overall stable. Does note long hx of sensation of food getting stuck when swallowing. States she is seeing her PCP soon for this and he may order endoscopy. When she takes her time chewing, this sensation is decreased. Has noticed when she begins walking down the stairs has to hold on to rail. Recent A1c of 8.6, PCP in process of better management of DM. Denies diplopia/ptosis, SOB, extremity weakness.    HPI 6/30/16:  Louisa Quarles is a 62 yo female with DMII and Ab+ oculobulbar MG dx'ed 2011 with hx of thymoma s/p thymectomy 11/2011. States she is doing well, does have occasional generalized weakness/ muscle aches. Notices a little difficulty with balance, denies falls. MG is well maintained on Prednisone 10 mg qOD (since 1/2016) and Mestinon 30 mg BID. D/C'ed Imuran 5/2015 due to marrow suppression. Has had both IVIG and PLEX in the past with minimal improvement. Denies SOB, diplopia/ptosis, dysarthria.     Interval 1/15/16: Doing well. No diplopia, ptosis, dysarthria or weakness. Prednisone 5mg/d, began last April.      Interval 8/19/15: Stopped imuran in May d/t marrow suppression. She was supposed to call me in June for follow up, but did not do so. She returns today in follow up.      Currently doing well. Minor symptoms.      Interval 4/6/15:  Doing well. No difficulty with diplopia, ptosis, dysarthria or dysphagia. No breathing issues.      Interval 1/5/15:  No problems chewing and swallowing. Vision has been 'okay'. No ptosis or diplopia. Sugar was better after we decreased the prednisone, but here lately has been a little higher.      Interval 10/6/14: No ptosis, no diplopia, no chewing and swallowing problems. No peripheral weakness. Has some blurry vision, but has been to ophtho and no cataracts. Sugars are running a little high - 300s are the norm.      Interval history: Began prednisone 40mg/d last  "month, along with mestinon. She's doing better in regards to ptosis, diplopia, chewing and swallowing. She feels good, but says that her vision is getting "bad". She has trouble with reading. She will make an eye appointment this week. Her sugar has been running a little high - rarely 300s - she's trying to control her diet and take her meds as prescribed.      Interval history: Ab+ MG s/p thymectomy, recently admitted to hospital for exacerbation and s/p PLEX and initiation of prednisone, returns today two weeks post-discharge. She was only provided with ten days of prednisone on discharge and she did not call the clinic to obtain more medication. She is starting to have more trouble swallowing in the last week or so. Her speech is also rather dysarthric.      HPI: Returns today. Her MG has been slowly getting worse over the last few months. More trouble with speech, swallowing and arm > leg weakness. Right eye ptosis.      Received IVIg Dec 26-30. She says that it "helped some", but did not improve her symptoms as much as she had hoped.      She requires liquids to help her swallow solid food b/c it "gets hung up" if she doesn't. She is thickening her liquids, not b/c she's choking, but b/c she's worried about choking. She doesn't have jorge luis orthopnea.      She has resumed taking mestinon, but she doesn't see that it's helping.      Her head is "heavy" lately - having trouble balancing it on her neck.      Background:  Louisa Quarles is a 60 y.o. female with Ab+, oculobulbar MG, diagnosed in 2011 after presenting with bulbar symptoms (ptosis, diplopia, dsyarthria). She responded to IVIg and underwent a workup, which revealed a thymoma. She is s/p thymectomy in November of 2011 followed by adjuvant radiation therapy. She has been maintained on Imuran 50mg/day, mestinon 30mg tid. She has been on prednisone in the past, but it was discontinued in December 2012.       ROS:   Review of Systems   Constitutional: " +malaise/fatigue. Negative for weight loss.   HENT: Negative for hearing loss.   Eyes: Negative for blurred vision and double vision.   Respiratory: Negative for shortness of breath and stridor.   Cardiovascular: Negative for chest pain and palpitations.   Gastrointestinal: Negative for nausea, vomiting and constipation.   Genitourinary: Negative for frequency. Negative for urgency.   Musculoskeletal: Negative for joint pain. Negative for myalgias and falls.   Skin: Negative for rash.   Neurological: Negative for dizziness and tremors. Negative for focal weakness and seizures.   Endo/Heme/Allergies: Does not bruise/bleed easily.   Psychiatric/Behavioral: Negative for memory loss. Negative for depression and hallucinations. The patient is not nervous/anxious.      Medications    Current Outpatient Medications:     ACCU-CHEK SHAKIRA PLUS Strp, , Disp: , Rfl:     ACCU-CHEK MULTICLIX LANCET lancets, , Disp: , Rfl:     atorvastatin (LIPITOR) 20 MG tablet, 20 mg., Disp: , Rfl:     calcium citrate-vitamin D3 315-200 mg (CITRACAL+D) 315-200 mg-unit per tablet, Take by mouth 2 (two) times daily., Disp: , Rfl:     carvedilol (COREG) 3.125 MG tablet, Take 3.125 mg by mouth 2 (two) times daily with meals., Disp: , Rfl:     fluticasone propionate (FLONASE) 50 mcg/actuation nasal spray, , Disp: , Rfl: 11    nystatin (MYCOSTATIN) 100,000 unit/mL suspension, 100,000 mg., Disp: , Rfl:     omeprazole (PRILOSEC) 40 MG capsule, Take 1 capsule (40 mg total) by mouth once daily., Disp: 30 capsule, Rfl: 10    predniSONE (DELTASONE) 20 MG tablet, Take 1 tablet (20 mg total) by mouth once daily., Disp: 90 tablet, Rfl: 1    pyridostigmine (MESTINON) 60 mg Tab, Take 0.5 tablets (30 mg total) by mouth 4 (four) times daily as needed (can take 2-4 times per day as needed)., Disp: 120 tablet, Rfl: 5    ranitidine (ZANTAC) 75 MG tablet, Take 75 mg by mouth once daily., Disp: , Rfl:     TRESIBA FLEXTOUCH U-200 200 unit/mL (3 mL) InPn, ,  Disp: , Rfl:     vitamin D 185 MG Tab, Take 185 mg by mouth once daily.  , Disp: , Rfl:     ACCU-CHEK SHAKIRA PLUS METER Mis, , Disp: , Rfl:     alpha lipoic acid 600 mg Cap, Take by mouth once daily., Disp: , Rfl:     aspirin 81 MG chewable tablet, Take 81 mg by mouth once daily.  , Disp: , Rfl:     azathioprine (IMURAN) 50 mg Tab, Take 2 pills in the morning and one pill in the evening., Disp: 270 tablet, Rfl: 3    AZELASTINE/FLUTICASONE (DYMISTA NASL), by Nasal route., Disp: , Rfl:     calcium carbonate (OS-MAE) 600 mg (1,500 mg) Tab, Take 600 mg by mouth 2 (two) times daily with meals.  , Disp: , Rfl:     citalopram (CELEXA) 20 MG tablet, Take 20 mg by mouth once daily.  , Disp: , Rfl:     docusate sodium (COLACE) 100 MG capsule, Take 100 mg by mouth.  , Disp: , Rfl:     HUMALOG KWIKPEN 100 unit/mL InPn pen, , Disp: , Rfl:     insulin aspart protamine-insulin aspart (NOVOLOG 70/30) 100 unit/mL (70-30) InPn, Inject 100 Units into the skin 3 (three) times daily.  , Disp: , Rfl:     insulin detemir (LEVEMIR) 100 unit/mL injection, Inject 100 Units into the skin every evening.  , Disp: , Rfl:     insulin glargine (LANTUS) 100 unit/mL Crtg, Inject 60 Units into the skin once daily.  , Disp: , Rfl:     ipratropium (ATROVENT) 0.03 % nasal spray, 2 sprays by Nasal route 2 (two) times daily., Disp: , Rfl:     JUBLIA 10 % Martin, , Disp: , Rfl:     magnesium oxide-Mg AA chelate (MG-PLUS-PROTEIN) 133 mg Tab, Take 250 mg by mouth., Disp: , Rfl:     melatonin 1 mg Tab, Take by mouth.  , Disp: , Rfl:     metformin (GLUCOPHAGE) 500 MG tablet, Take 500 mg by mouth before breakfast.  , Disp: , Rfl:     NOVOLOG FLEXPEN 100 unit/mL InPn pen, , Disp: , Rfl:     oxycodone-acetaminophen (PERCOCET) 5-325 mg per tablet, Take 1 tablet by mouth every 6 (six) hours as needed.  , Disp: , Rfl:     potassium chloride (KLOR-CON) 20 mEq packet, Take 20 mEq by mouth once daily., Disp: 30 tablet, Rfl: 10    travoprost  "(TRAVATAN Z) 0.004 % ophthalmic solution, Travatan Z 0.004 % eye drops, Disp: , Rfl:     zolpidem (AMBIEN) 10 mg Tab, 10 mg., Disp: , Rfl:       Physical Exam  BP (!) 151/92   Pulse 96   Ht 5' 9" (1.753 m)   Wt 77.3 kg (170 lb 6.7 oz)   BMI 25.17 kg/m²       Constitutional  Well-developed, well-nourished, appears stated age   Neurological    * Mental status      - Orientation  Oriented to person, place, time, and situation     - Attention/concentration  Attentive, vigilant during exam     - Executive  Well-organized thoughts     - Other     * Cranial nerves       - CN II  PERRL     - CN III, IV, VI  Extraocular movements full, normal pursuits and saccades     - CN V  Sensation V1 - V3 intact     - CN VII  Face strong and symmetric bilaterally     - CN VIII  Hearing intact bilaterally     - CN IX, X  Palate raises midline and symmetric     - CN XI  SCM and trapezius 5/5 bilaterally     - CN XII  Tongue midline   * Motor  Muscle bulk normal, strength 5/5 throughout. No fatiguability   * Sensory   Intact to light touch throughout   * Coordination  No dysmetria with finger-to-nose   * Gait  Normal casual gait   * Deep tendon reflexes  2+ and symmetric throughout       Lab and Test Results  Lab Results   Component Value Date    HGBA1C 5.7 01/09/2014     Lab Results   Component Value Date    TSH 1.379 01/09/2014       Assessment and Plan    Louisa Quarles is a 64 y.o. female with antibody positive MG, currently maintained on prednisone and IVIG with mestinon PRN. She is currently receiving IVIG every 6 weeks and seems stable. I suspect that her vague complaints are not related to MG, so I suggest that we continue to taper prednisone to 15mg a day. I reeducated her on the symptoms of MG, and when to seek medical attention.       Problem List Items Addressed This Visit        Neuro    Myasthenia gravis, AChR antibody positive - Primary    Overview     Ab+, thymoma positive, s/p resection (2011), generalized MG, " diagnosed 2011  Prednisone, mestinon, IVIG  Rescue: Plex, IVIG; both moderately helpful  Prior: Imuran -> marrow suppression         Current Assessment & Plan     -- symptoms stable  -- decrease prednisone to 15mg daily  -- continue IVIG q6 weeks  -- RTC in 3 months         Relevant Medications    predniSONE (DELTASONE) 20 MG tablet            Marleni Chappell MD  Neurology Resident   [facility]  [address] [place] [address]  Pager: [phone]

## 2019-05-14 NOTE — PATIENT INSTRUCTIONS
Decrease prednisone to 15mg every day      Discharge Instructions for Myasthenia Gravis  You have been diagnosed with myasthenia gravis, a disease that affects the transmission of nerve impulses to the muscles. This causes the muscles to become weak. The muscle weakness usually gets worse during periods of activity and improves after periods of rest. Heres what you can do to help yourself feel better.  Activity  Try these tips to help you deal with your daily activities:  · Remember, its normal to have times when you feel energetic and times when you feel exhausted. Daily changes in your energy level are common.  · Plan your daily activities around the times when you feel more energetic. These periods are usually in the morning or after a nap. You may have more weakness at the end of the day.  · Rest often throughout the day.  · Avoid strenuous exercise. Short walks spread out through the day will keep you fit without exhausting you.  · Do one thing at a time.  · Allow yourself plenty of time to get ready for appointments so youre not rushed.  · To save energy while getting dressed, lay out your clothes and accessories in one area where its easy for you to reach everything. Try to avoid making extra trips back and forth to your closet or dresser drawers.  · Install grab bars in your shower or tub to make it easier to get in and out. A shower chair may also be helpful.  · Strengthen your voice by reading aloud. Singing is also a good exercise.  · Add a voice amplifier to your phone so that others can hear you better.  Medical care and support  · Take your medicine exactly as directed.  · Use prescribed eye drops for dry eyes. Dry eyes and other eye problems are common with myasthenia gravis.  · Make regular follow-up appointments with your healthcare provider.  · Wear a medical identification bracelet that shows you have myasthenia gravis.  · Join a support group. Ask your healthcare provider about groups in your  area.  · Do not start new medicines without checking with your healthcare provider.  Other precautions  · Protect yourself from infection:  ¨ Wash your hands often; keep them away from your face. Most germs are spread by hand-to-mouth contact.  ¨ Get a flu shot every year. Ask your healthcare provider about pneumonia vaccines.  ¨ Stay out of crowds, especially in the winter; thats when more people have colds and the flu.  · Avoid drinking alcohol. Alcohol can increase weakness.  · Make an appointment with a nutritionist (or dietitian). During extended times of weakness, you may need to change your diet to avoid choking. A nutritionist can help you plan for these times. Here are some tips that should help:  ¨ Eat soft foods, such as mashed potatoes or applesauce, to make swallowing easier.  ¨ Eat warm (not hot) foods.  ¨ Eat slowly. Cut your food into small pieces and chew it thoroughly before swallowing.  Follow-up care  Make a follow-up appointment.  When to call your healthcare provider  Call your healthcare provider right away if you have any of the following:  · Trouble swallowing, chewing, speaking, or breathing  · Weakness in your face  · Excessive sweating  · Drooling  · Dizziness or confusion  · Extreme muscle weakness  · Double vision or blurred vision  · Stomach pain or diarrhea   Date Last Reviewed: 11/8/2015  © 4566-5269 [employer]. [address] [place] [address], [place] [address]. All rights reserved. This information is not intended as a substitute for professional medical care. Always follow your healthcare professional's instructions.

## 2019-05-18 NOTE — ASSESSMENT & PLAN NOTE
-- symptoms stable  -- decrease prednisone to 15mg daily  -- continue IVIG q6 weeks  -- RTC in 3 months

## 2019-05-19 NOTE — PROGRESS NOTES
I have reviewed the notes, assessments, and/or procedures performed by Dr. Chappell, I concur with her documentation of Louisa Quarles.

## 2019-05-22 ENCOUNTER — TELEPHONE (OUTPATIENT)
Dept: NEUROLOGY | Facility: CLINIC | Age: 64
End: 2019-05-22

## 2019-05-22 NOTE — TELEPHONE ENCOUNTER
Incoming message from Tigre CHAWLA MA to inform patient is has question regarding billing of IVIG. Left voicemail for patient to return call.

## 2019-07-19 RX ORDER — PYRIDOSTIGMINE BROMIDE 60 MG/1
TABLET ORAL
Qty: 120 TABLET | Refills: 5 | Status: SHIPPED | OUTPATIENT
Start: 2019-07-19 | End: 2020-02-10 | Stop reason: SDUPTHER

## 2019-07-19 NOTE — TELEPHONE ENCOUNTER
----- Message from Candelario Garces sent at 7/19/2019  1:59 PM CDT -----  Contact: Patient  Rx Refill/Request     Is this a Refill or New Rx:  Yes   Rx Name and Strength: pyridostigmine (MESTINON) 60 mg Tab     Preferred Pharmacy with phone number:     [id number] - [place], [place] - [address] [place] [address]  Phone: [phone] Fax: [phone]

## 2019-08-12 RX ORDER — PREDNISONE 5 MG/1
5 TABLET ORAL DAILY
Qty: 90 TABLET | Refills: 0 | Status: SHIPPED | OUTPATIENT
Start: 2019-08-12 | End: 2019-08-29 | Stop reason: SDUPTHER

## 2019-08-29 ENCOUNTER — OFFICE VISIT (OUTPATIENT)
Dept: NEUROLOGY | Facility: CLINIC | Age: 64
End: 2019-08-29
Payer: MEDICARE

## 2019-08-29 VITALS
WEIGHT: 166.88 LBS | SYSTOLIC BLOOD PRESSURE: 152 MMHG | HEIGHT: 69 IN | HEART RATE: 76 BPM | BODY MASS INDEX: 24.72 KG/M2 | DIASTOLIC BLOOD PRESSURE: 87 MMHG

## 2019-08-29 DIAGNOSIS — G70.00 MYASTHENIA GRAVIS, ACHR ANTIBODY POSITIVE: Primary | ICD-10-CM

## 2019-08-29 DIAGNOSIS — Z79.4 DIABETES MELLITUS DUE TO UNDERLYING CONDITION WITH HYPERGLYCEMIA, WITH LONG-TERM CURRENT USE OF INSULIN: ICD-10-CM

## 2019-08-29 DIAGNOSIS — Z79.52 ON PREDNISONE THERAPY: ICD-10-CM

## 2019-08-29 DIAGNOSIS — E08.65 DIABETES MELLITUS DUE TO UNDERLYING CONDITION WITH HYPERGLYCEMIA, WITH LONG-TERM CURRENT USE OF INSULIN: ICD-10-CM

## 2019-08-29 PROBLEM — D49.89 THYMOMA: Status: RESOLVED | Noted: 2019-01-09 | Resolved: 2019-08-29

## 2019-08-29 PROBLEM — H35.53 STARGARDT'S DISEASE: Status: ACTIVE | Noted: 2019-08-29

## 2019-08-29 PROCEDURE — 99214 OFFICE O/P EST MOD 30 MIN: CPT | Mod: S$PBB,GC,, | Performed by: STUDENT IN AN ORGANIZED HEALTH CARE EDUCATION/TRAINING PROGRAM

## 2019-08-29 PROCEDURE — 99214 PR OFFICE/OUTPT VISIT, EST, LEVL IV, 30-39 MIN: ICD-10-PCS | Mod: S$PBB,GC,, | Performed by: STUDENT IN AN ORGANIZED HEALTH CARE EDUCATION/TRAINING PROGRAM

## 2019-08-29 PROCEDURE — 99999 PR PBB SHADOW E&M-EST. PATIENT-LVL V: CPT | Mod: PBBFAC,GC,, | Performed by: STUDENT IN AN ORGANIZED HEALTH CARE EDUCATION/TRAINING PROGRAM

## 2019-08-29 PROCEDURE — 99999 PR PBB SHADOW E&M-EST. PATIENT-LVL V: ICD-10-PCS | Mod: PBBFAC,GC,, | Performed by: STUDENT IN AN ORGANIZED HEALTH CARE EDUCATION/TRAINING PROGRAM

## 2019-08-29 PROCEDURE — 99215 OFFICE O/P EST HI 40 MIN: CPT | Mod: PBBFAC | Performed by: STUDENT IN AN ORGANIZED HEALTH CARE EDUCATION/TRAINING PROGRAM

## 2019-08-29 RX ORDER — PREDNISONE 5 MG/1
5 TABLET ORAL DAILY
Qty: 90 TABLET | Refills: 1 | Status: SHIPPED | OUTPATIENT
Start: 2019-11-12 | End: 2019-11-18 | Stop reason: DRUGHIGH

## 2019-08-29 RX ORDER — PREDNISONE 10 MG/1
10 TABLET ORAL DAILY
Qty: 30 TABLET | Refills: 3 | Status: SHIPPED | OUTPATIENT
Start: 2019-08-29 | End: 2019-11-18 | Stop reason: DRUGHIGH

## 2019-08-29 NOTE — PATIENT INSTRUCTIONS
- Please stop by the lab on the second floor on your way out today  - continue prednisone as you were taking it before   - you can continue Pyridostigmine as you were taking before or as needed  - if you decide to have your PCP to order the Dexa scan for your bone density please bring results next time you come to visit

## 2019-08-29 NOTE — PROGRESS NOTES
Neurology Clinic  Initial Visit    Patient Name: Louisa Quarles  MRN: [id number]    CC: Myasthenia Gravis     HPI: Louisa Quarles is a 64 y.o. RH/LH female with DM-II and Ab+ oculobulbar Myasthenia Gravis (Dx 2011, s/p thymectomy + adjuvant RTX), who is presenting to the clinic today to establish care with me for f/u on MG.    Ms Quarles is new to me, she was previously followed by Dr. Abrams and then Dr. Montilla. She was last seen in the clinic in 5/14/19. Per review of charts and previous notes; patient was maintained on prednisone, Mestinon and Imuran in the past. Patient has received PLEX in the past with minimal response (last on Feb 2014). Imuran had to be discontinued in 2015 due to bone marrow suppression. Frequency and dosing of Mestinon and prednisone have been increased throughout the years due to progression of symptoms. Most recently she has been maintained on Prednisone 15 mg QD (tapered from 20 during last visit), Mestinon 30 mg TID; IVIG Q6 weeks (last dose 8/28).    Today she is reporting fluctuations in her symptoms, she has good and bad days (generalized weakness). She thinks IVIG might give her a boost for a couple of weeks and then she gradually gets back to where she was. She can not tell if tapering steroids has effected her symptoms. Today she denies any double vision, droopy eyelids, slurred speech, nasal speech or difficulty breathing. She intermittently chokes on solid food so she takes small bites. She complains of drooling and increased drainage from sinuses. She does not have any difficulty with walking long distances or working with arms. Recently her vision is more blurred and that causes her to have headaches. She has an appointment with her ophthalmologist in October. Patient also c/o losing the taste and dryness of lips.       Review of Systems:  General: fevers -, chills -, fatigue +, change in appetite -  Eyes: blurred vision -, double vision -, photosensitivity  -  ENT: hearing loss -, tinnitus -, difficulty swallowing -, drooling -, change in voice -  Respiratory: SOB -, cough -,  choking -  CV: chest pain -, palpitations -  GI: nausea -, vomiting -, abdominal pain -  Urinary: dysuria -, hematuria -, frequency -, urine incontinence  Skin: rash -, change of color -  Musculoskeletal: arthralgia -, myalgia -, joint swelling -  Psychiatric: hallucinations -, confusion -, dysphoric mood -, anxiety -    Neurological:   Headache -, dizziness -, weakness +, numbness -, seizure -, gait problem -, balance problem -, difficulty in speech -      Past Medical History  Past Medical History:   Diagnosis Date    Cancer     Diabetes mellitus     Headache(784.0)     HEARING LOSS     History of thymectomy 7/16/2012    Memory loss     Myasthenia exacerbation     Myasthenia gravis with acute exacerbation     Myasthenia gravis with thymoma     Thymoma     Vision abnormalities     Stargadtch's disease/Macular degeneration -causes vision loss bilaterall, right worse than left        Medications    Current Outpatient Medications:     ACCU-CHEK SHAKIRA PLUS METER Misc, , Disp: , Rfl:     ACCU-CHEK SHAKIRA PLUS Strp, , Disp: , Rfl:     ACCU-CHEK MULTICLIX LANCET lancets, , Disp: , Rfl:     alpha lipoic acid 600 mg Cap, Take by mouth once daily., Disp: , Rfl:     aspirin 81 MG chewable tablet, Take 81 mg by mouth once daily.  , Disp: , Rfl:     atorvastatin (LIPITOR) 20 MG tablet, 20 mg., Disp: , Rfl:     AZELASTINE/FLUTICASONE (DYMISTA NASL), by Nasal route., Disp: , Rfl:     calcium carbonate (OS-MAE) 600 mg (1,500 mg) Tab, Take 600 mg by mouth 2 (two) times daily with meals.  , Disp: , Rfl:     calcium citrate-vitamin D3 315-200 mg (CITRACAL+D) 315-200 mg-unit per tablet, Take by mouth 2 (two) times daily., Disp: , Rfl:     carvedilol (COREG) 3.125 MG tablet, Take 3.125 mg by mouth 2 (two) times daily with meals., Disp: , Rfl:     citalopram (CELEXA) 20 MG tablet, Take 20 mg by  mouth once daily.  , Disp: , Rfl:     docusate sodium (COLACE) 100 MG capsule, Take 100 mg by mouth.  , Disp: , Rfl:     fluticasone propionate (FLONASE) 50 mcg/actuation nasal spray, , Disp: , Rfl: 11    HUMALOG KWIKPEN 100 unit/mL InPn pen, , Disp: , Rfl:     insulin aspart protamine-insulin aspart (NOVOLOG 70/30) 100 unit/mL (70-30) InPn, Inject 100 Units into the skin 3 (three) times daily.  , Disp: , Rfl:     insulin detemir (LEVEMIR) 100 unit/mL injection, Inject 100 Units into the skin every evening.  , Disp: , Rfl:     insulin glargine (LANTUS) 100 unit/mL Crtg, Inject 60 Units into the skin once daily.  , Disp: , Rfl:     ipratropium (ATROVENT) 0.03 % nasal spray, 2 sprays by Nasal route 2 (two) times daily., Disp: , Rfl:     JUBLIA 10 % Martin, , Disp: , Rfl:     magnesium oxide-Mg AA chelate (MG-PLUS-PROTEIN) 133 mg Tab, Take 250 mg by mouth., Disp: , Rfl:     melatonin 1 mg Tab, Take by mouth.  , Disp: , Rfl:     metformin (GLUCOPHAGE) 500 MG tablet, Take 500 mg by mouth before breakfast.  , Disp: , Rfl:     NOVOLOG FLEXPEN 100 unit/mL InPn pen, , Disp: , Rfl:     nystatin (MYCOSTATIN) 100,000 unit/mL suspension, 100,000 mg., Disp: , Rfl:     omeprazole (PRILOSEC) 40 MG capsule, Take 1 capsule (40 mg total) by mouth once daily., Disp: 30 capsule, Rfl: 10    oxycodone-acetaminophen (PERCOCET) 5-325 mg per tablet, Take 1 tablet by mouth every 6 (six) hours as needed.  , Disp: , Rfl:     potassium chloride (KLOR-CON) 20 mEq packet, Take 20 mEq by mouth once daily., Disp: 30 tablet, Rfl: 10    [START ON 11/12/2019] predniSONE (DELTASONE) 5 MG tablet, Take 1 tablet (5 mg total) by mouth once daily., Disp: 90 tablet, Rfl: 1    pyridostigmine (MESTINON) 60 mg Tab, TAKE 1/2 (ONE-HALF) TABLET( 30 MG TOTAL) BY MOUTH 4 TIMES DAILY AS NEEDED(CAN TAKE 2-4 TIME A DAY AS NEEDED), Disp: 120 tablet, Rfl: 5    ranitidine (ZANTAC) 75 MG tablet, Take 75 mg by mouth once daily., Disp: , Rfl:     travoprost  "(TRAVATAN Z) 0.004 % ophthalmic solution, Travatan Z 0.004 % eye drops, Disp: , Rfl:     TRESIBA FLEXTOUCH U-200 200 unit/mL (3 mL) InPn, , Disp: , Rfl:     vitamin D 185 MG Tab, Take 185 mg by mouth once daily.  , Disp: , Rfl:     zolpidem (AMBIEN) 10 mg Tab, 10 mg., Disp: , Rfl:     predniSONE (DELTASONE) 10 MG tablet, Take 1 tablet (10 mg total) by mouth once daily., Disp: 30 tablet, Rfl: 3  Any other notable medications as documented in HPI    Allergies  Review of patient's allergies indicates:  No Known Allergies    Social History  Socioeconomic History    Marital status: Single   Tobacco Use    Smoking status: Former Smoker     Packs/day: 0.50     Years: 10.00     Pack years: 5.00     Types: Cigarettes     Last attempt to quit: 2011     Years since quittin.0   Substance and Sexual Activity    Alcohol use: No    Drug use: No    Sexual activity: Not on file     Any other notable Social History as documented in HPI.    Family History  Family History   Problem Relation Age of Onset    Cancer Sister         breast    Diabetes Sister      Any other notable FMH as documented in HPI.    Physical Exam  BP (!) 152/87   Pulse 76   Ht 5' 9" (1.753 m)   Wt 75.7 kg (166 lb 14.2 oz)   BMI 24.65 kg/m²     General: Well-developed, well-groomed. No apparent distress  HENT: Normocephalic, atraumatic. No carotid bruits auscultated.   Musculoskeletal: No peripheral edema, No joint swelling  Skin: No rash    Neurologic Exam:   Awake, alert and oriented x3  Short term memory intact  Praxis normal  Speech Normal. Language is fluent.      CN II - CN XII:  PERRLA. EOM intact. No Nystagmus. No ophthalmoplegia. Slight R ptosis.  Visual fields are affected by chronic DM and Stargardt. No central vision in R eye  Facial sensation is normal to light touch.   Facial expression is full and symmetric.   Hearing is intact bilaterally.   Palate elevates symmetrically.   SCM and Trapezius full strength bilaterally.   Tongue " is midline.     Motor:  normal bulk and tone in all four limbs.   There are no atrophy or fasciculations.   Strength is 5/5 throughout.  No bradykinesia.  No dysdiadochokinesia.    Sensory:  Sensation to light touch: intact in BUE and BLE  Sensation to vibration: intact in BUE and BLE    DTRs:  2+ and symmetric throughout.    Babinski normal bilaterally.  No clonus.     Gait and Coordination:  Normal gait.  Normal tandem walking.  Finger to nose is normal bilaterally.      Lab and Test Results  No recent labs since 2018    Images:  N/A      Assessment and Plan    Problem List Items Addressed This Visit     Myasthenia gravis, AChR antibody positive - Primary    Overview     Ab+, thymoma positive, s/p resection (2011), generalized MG, diagnosed 2011  Prednisone, mestinon, IVIG  Rescue: Plex, IVIG; both moderately helpful  Prior: Imuran -> marrow suppression         Current Assessment & Plan     Clinically stable on the currently regimen. Patient is unsure if she is getting any direct and immediate relief from either Mestinon or IVIG. Since this is the first time I am seeing Ms Quarles and can't compare her clinical response, will stay the course, and might consider changes in treatment next time I see her. Dr. Perez and I discussed the alternative option for treatment (Soliris) with the patient and she agreed to do shanthi research on it.    - continue prednisone 15 mg daily  - continue Pyridostigmine 30 mg TID (or as needed)  - will have her follow up in 3 months         On prednisone therapy    Current Assessment & Plan     Chronic steroid use with subsequent side effects (loss of taste, dry skin,..) in a diabetic patient requires close monitoring.    - will obtain Hb A1c and vitamin D level  - ordered Dexa scan to assess bone density (patient prefers if her PCP orders it to be done in [place]), requested that she brings the results with her next time  - recommended annual ophthalmology exam   - will send the note and our  recommendations to her PCP Dr. Anderson         Relevant Orders    VITAMIN D (Completed)    HEMOGLOBIN A1C (Completed)    DXA Bone Density Spine And Hip    Diabetes mellitus due to underlying condition with hyperglycemia     Relevant Orders    HEMOGLOBIN A1C (Completed)            Over 70% of this 60 minute visit was spent in direct face to face counseling of the patient about her symptoms, treatment options, future diagnostic testing, and symptom management.         Savanah Moran MD  PGY-III, Neurology Resident  [facility] [address] [place] [address]

## 2019-08-30 PROBLEM — E08.65 DIABETES MELLITUS DUE TO UNDERLYING CONDITION WITH HYPERGLYCEMIA: Status: ACTIVE | Noted: 2019-08-30

## 2019-08-30 NOTE — ASSESSMENT & PLAN NOTE
Clinically stable on the currently regimen. Patient is unsure if she is getting any direct and immediate relief from either Mestinon or IVIG. Since this is the first time I am seeing Ms Quarles and can't compare her clinical response, will stay the course, and might consider changes in treatment next time I see her. Dr. Perez and I discussed the alternative option for treatment (Soliris) with the patient and she agreed to do shanthi research on it.    - continue prednisone 15 mg daily  - continue Pyridostigmine 30 mg TID (or as needed)  - will have her follow up in 3 months

## 2019-08-30 NOTE — ASSESSMENT & PLAN NOTE
Chronic steroid use with subsequent side effects (loss of taste, dry skin,..) in a diabetic patient requires close monitoring.    - will obtain Hb A1c and vitamin D level  - ordered Dexa scan to assess bone density (patient prefers if her PCP orders it to be done in [place]), requested that she brings the results with her next time  - recommended annual ophthalmology exam   - will send the note and our recommendations to her PCP Dr. Anderson

## 2019-10-01 ENCOUNTER — TELEPHONE (OUTPATIENT)
Dept: NEUROLOGY | Facility: CLINIC | Age: 64
End: 2019-10-01

## 2019-10-30 ENCOUNTER — TELEPHONE (OUTPATIENT)
Dept: NEUROLOGY | Facility: CLINIC | Age: 64
End: 2019-10-30

## 2019-10-30 NOTE — TELEPHONE ENCOUNTER
Patient would like you to know she is having rotator cuff surgery. Please advise if you have any questions.

## 2019-11-06 ENCOUNTER — TELEPHONE (OUTPATIENT)
Dept: NEUROLOGY | Facility: CLINIC | Age: 64
End: 2019-11-06

## 2019-11-06 NOTE — TELEPHONE ENCOUNTER
----- Message from Adriana Salome sent at 11/5/2019 11:42 AM CST -----  Contact: Louisa   tel:   [phone]   Caller says she has a torn rotary cuff and needs to have surgery.   Has  EDGAR And wants to make sure she can get the surgery done.   Pls call to discuss.

## 2019-11-18 ENCOUNTER — OFFICE VISIT (OUTPATIENT)
Dept: NEUROLOGY | Facility: CLINIC | Age: 64
End: 2019-11-18
Payer: MEDICARE

## 2019-11-18 VITALS
HEART RATE: 91 BPM | BODY MASS INDEX: 24.2 KG/M2 | WEIGHT: 169 LBS | HEIGHT: 70 IN | SYSTOLIC BLOOD PRESSURE: 147 MMHG | DIASTOLIC BLOOD PRESSURE: 81 MMHG

## 2019-11-18 DIAGNOSIS — G70.00 MYASTHENIA GRAVIS, ACHR ANTIBODY POSITIVE: Primary | ICD-10-CM

## 2019-11-18 DIAGNOSIS — Z79.52 ON PREDNISONE THERAPY: ICD-10-CM

## 2019-11-18 PROCEDURE — 99213 PR OFFICE/OUTPT VISIT, EST, LEVL III, 20-29 MIN: ICD-10-PCS | Mod: S$PBB,GC,, | Performed by: STUDENT IN AN ORGANIZED HEALTH CARE EDUCATION/TRAINING PROGRAM

## 2019-11-18 PROCEDURE — 99999 PR PBB SHADOW E&M-EST. PATIENT-LVL V: ICD-10-PCS | Mod: PBBFAC,GC,, | Performed by: STUDENT IN AN ORGANIZED HEALTH CARE EDUCATION/TRAINING PROGRAM

## 2019-11-18 PROCEDURE — 99213 OFFICE O/P EST LOW 20 MIN: CPT | Mod: S$PBB,GC,, | Performed by: STUDENT IN AN ORGANIZED HEALTH CARE EDUCATION/TRAINING PROGRAM

## 2019-11-18 PROCEDURE — 99999 PR PBB SHADOW E&M-EST. PATIENT-LVL V: CPT | Mod: PBBFAC,GC,, | Performed by: STUDENT IN AN ORGANIZED HEALTH CARE EDUCATION/TRAINING PROGRAM

## 2019-11-18 PROCEDURE — 99215 OFFICE O/P EST HI 40 MIN: CPT | Mod: PBBFAC | Performed by: STUDENT IN AN ORGANIZED HEALTH CARE EDUCATION/TRAINING PROGRAM

## 2019-11-18 RX ORDER — PREDNISONE 10 MG/1
10 TABLET ORAL DAILY
Qty: 30 TABLET | Refills: 3 | Status: SHIPPED | OUTPATIENT
Start: 2019-11-18 | End: 2020-02-10 | Stop reason: SDUPTHER

## 2019-11-18 NOTE — PATIENT INSTRUCTIONS
Myasthenia Gravis IMPORTANT INFORMATION:  Elective intubation should be considered if serial measurements of the VC show values less than 20 mL/kg or if the NIF is worse than -30 cmH20.    Medication Warning List:           1. Absolute contraindication  -- Curare   -- D-penicillamine   -- Botulinum toxin   -- Interferon alpha    2. Contraindicated  -- Antibiotics  * Aminoglycosides (gentamycin, kanamycin, neomycin, streptomycin, tobramycine)  * Macrolides (erythromycin, azithromycin (Z-pack), telithromycin, Biaxin)  * Fluoroquinolones ( ciprofloxacin, norfloxacin, levofloxacin)  -- quinine, quinidine, procainamide  -- magnesium salts, iv magnesium replacement.    3. Caution- may exacerbate weakness in some myasthenics  -- Calcium channel blockers  -- Beta blockers  -- Lithium  -- Statins  -- Iodinated contrast agents  -- Benzodiazepines        **edelase call our clinic if symptoms worsen/reoccur with tapering steroids

## 2019-11-18 NOTE — PROGRESS NOTES
Neurology Clinic      Patient Name: Louisa Quarles  MRN: [id number]    CC: Myasthenia Gravis     HPI: Louisa Quarles is a 64 y.o. RH female with DM-II and Ab+ oculobulbar Myasthenia Gravis (Dx 2011, s/p thymectomy + adjuvant RTX), who is presenting to the clinic today for f/u on MG.      Interval Hx (11/18/19):  Patient is 20 minutes late to her appointment, however I spent 30 minutes with her. Overall feels ok, has good and bad days, depending on the day she has and/or stress levels.   She denies any double vision, SOB or choking on food or water unless she is eating fast. She feels weaker in the mornings rather than evenings. Continued on IVIG q6 weeks (due on 11/20), and mestinon 30 mg BID (morning and lunch), and prednisone 15 mg daily.  She is also inquiring about safety of rotator cuff surgery from MG standpoint, provided a print of contraindicated medication (including anesthetics) in myasthenic patients.   Patient is also inquiring about Soliris that we discussed last time. I provided her with printed information again. Explained to her that she should receive infusions weekly x4 weeks and on 5th week will switch to biweekly infusion, each taking 35 minutes. Patient is not interested at this time due to difficulty of transportation every week.       Initial encounter (8/29/19):  Ms Quarles is new to me, she was previously followed by Dr. Abrams and then Dr. Montilla. She was last seen in the clinic in 5/14/19. Per review of charts and previous notes; patient was maintained on prednisone, Mestinon and Imuran in the past. Patient has received PLEX in the past with minimal response (last on Feb 2014). Imuran had to be discontinued in 2015 due to bone marrow suppression. Frequency and dosing of Mestinon and prednisone have been increased throughout the years due to progression of symptoms. Most recently she has been maintained on Prednisone 15 mg QD (tapered from 20 during last visit), Mestinon 30 mg  TID; IVIG Q6 weeks (last dose 8/28).    Today she is reporting fluctuations in her symptoms, she has good and bad days (generalized weakness). She thinks IVIG might give her a boost for a couple of weeks and then she gradually gets back to where she was. She can not tell if tapering steroids has effected her symptoms. Today she denies any double vision, droopy eyelids, slurred speech, nasal speech or difficulty breathing. She intermittently chokes on solid food so she takes small bites. She complains of drooling and increased drainage from sinuses. She does not have any difficulty with walking long distances or working with arms. Recently her vision is more blurred and that causes her to have headaches. She has an appointment with her ophthalmologist in October. Patient also c/o losing the taste and dryness of lips.       Review of Systems:  General: fevers -, chills -, fatigue +, change in appetite -  Eyes: blurred vision -, double vision -, photosensitivity -  ENT: hearing loss -, tinnitus -, difficulty swallowing -, drooling -, change in voice -  Respiratory: SOB -, cough -,  choking -  CV: chest pain -, palpitations -  GI: nausea -, vomiting -, abdominal pain -  Urinary: dysuria -, hematuria -, frequency -, urine incontinence  Skin: rash -, change of color -  Musculoskeletal: arthralgia -, myalgia -, joint swelling -  Psychiatric: hallucinations -, confusion -, dysphoric mood -, anxiety -    Neurological:   Headache -, dizziness -, weakness -, numbness -, seizure -, gait problem -, balance problem -, difficulty in speech -      Past Medical History  Past Medical History:   Diagnosis Date    Cancer     Diabetes mellitus     Headache(784.0)     HEARING LOSS     History of thymectomy 7/16/2012    Memory loss     Myasthenia exacerbation     Myasthenia gravis with acute exacerbation     Myasthenia gravis with thymoma     Thymoma     Vision abnormalities     Stargadtch's disease/Macular degeneration -causes  vision loss bilaterall, right worse than left        Medications    Current Outpatient Medications:     ACCU-CHEK SHAKIRA PLUS METER Misc, , Disp: , Rfl:     ACCU-CHEK SHAKIRA PLUS Strp, , Disp: , Rfl:     ACCU-CHEK MULTICLIX LANCET lancets, , Disp: , Rfl:     alpha lipoic acid 600 mg Cap, Take by mouth once daily., Disp: , Rfl:     aspirin 81 MG chewable tablet, Take 81 mg by mouth once daily.  , Disp: , Rfl:     atorvastatin (LIPITOR) 20 MG tablet, 20 mg., Disp: , Rfl:     AZELASTINE/FLUTICASONE (DYMISTA NASL), by Nasal route., Disp: , Rfl:     calcium carbonate (OS-MAE) 600 mg (1,500 mg) Tab, Take 600 mg by mouth 2 (two) times daily with meals.  , Disp: , Rfl:     calcium citrate-vitamin D3 315-200 mg (CITRACAL+D) 315-200 mg-unit per tablet, Take by mouth 2 (two) times daily., Disp: , Rfl:     carvedilol (COREG) 3.125 MG tablet, Take 3.125 mg by mouth 2 (two) times daily with meals., Disp: , Rfl:     citalopram (CELEXA) 20 MG tablet, Take 20 mg by mouth once daily.  , Disp: , Rfl:     docusate sodium (COLACE) 100 MG capsule, Take 100 mg by mouth.  , Disp: , Rfl:     fluticasone propionate (FLONASE) 50 mcg/actuation nasal spray, , Disp: , Rfl: 11    HUMALOG KWIKPEN 100 unit/mL InPn pen, , Disp: , Rfl:     insulin aspart protamine-insulin aspart (NOVOLOG 70/30) 100 unit/mL (70-30) InPn, Inject 100 Units into the skin 3 (three) times daily.  , Disp: , Rfl:     insulin detemir (LEVEMIR) 100 unit/mL injection, Inject 100 Units into the skin every evening.  , Disp: , Rfl:     insulin glargine (LANTUS) 100 unit/mL Crtg, Inject 60 Units into the skin once daily.  , Disp: , Rfl:     ipratropium (ATROVENT) 0.03 % nasal spray, 2 sprays by Nasal route 2 (two) times daily., Disp: , Rfl:     JUBLIA 10 % Martin, , Disp: , Rfl:     magnesium oxide-Mg AA chelate (MG-PLUS-PROTEIN) 133 mg Tab, Take 250 mg by mouth., Disp: , Rfl:     melatonin 1 mg Tab, Take by mouth.  , Disp: , Rfl:     metformin (GLUCOPHAGE) 500 MG  tablet, Take 500 mg by mouth before breakfast.  , Disp: , Rfl:     NOVOLOG FLEXPEN 100 unit/mL InPn pen, , Disp: , Rfl:     nystatin (MYCOSTATIN) 100,000 unit/mL suspension, 100,000 mg., Disp: , Rfl:     omeprazole (PRILOSEC) 40 MG capsule, Take 1 capsule (40 mg total) by mouth once daily., Disp: 30 capsule, Rfl: 10    oxycodone-acetaminophen (PERCOCET) 5-325 mg per tablet, Take 1 tablet by mouth every 6 (six) hours as needed.  , Disp: , Rfl:     potassium chloride (KLOR-CON) 20 mEq packet, Take 20 mEq by mouth once daily., Disp: 30 tablet, Rfl: 10    predniSONE (DELTASONE) 10 MG tablet, Take 1 tablet (10 mg total) by mouth once daily., Disp: 30 tablet, Rfl: 3    pyridostigmine (MESTINON) 60 mg Tab, TAKE 1/2 (ONE-HALF) TABLET( 30 MG TOTAL) BY MOUTH 4 TIMES DAILY AS NEEDED(CAN TAKE 2-4 TIME A DAY AS NEEDED), Disp: 120 tablet, Rfl: 5    ranitidine (ZANTAC) 75 MG tablet, Take 75 mg by mouth once daily., Disp: , Rfl:     travoprost (TRAVATAN Z) 0.004 % ophthalmic solution, Travatan Z 0.004 % eye drops, Disp: , Rfl:     TRESIBA FLEXTOUCH U-200 200 unit/mL (3 mL) InPn, , Disp: , Rfl:     vitamin D 185 MG Tab, Take 185 mg by mouth once daily.  , Disp: , Rfl:     zolpidem (AMBIEN) 10 mg Tab, 10 mg., Disp: , Rfl:   Any other notable medications as documented in HPI    Allergies  Review of patient's allergies indicates:  No Known Allergies    Social History  Socioeconomic History    Marital status: Single   Tobacco Use    Smoking status: Former Smoker     Packs/day: 0.50     Years: 10.00     Pack years: 5.00     Types: Cigarettes     Last attempt to quit: 2011     Years since quittin.0   Substance and Sexual Activity    Alcohol use: No    Drug use: No    Sexual activity: Not on file     Any other notable Social History as documented in HPI.    Family History  Family History   Problem Relation Age of Onset    Cancer Sister         breast    Diabetes Sister      Any other notable FMH as documented in  "HPI.    Physical Exam  BP (!) 147/81   Pulse 91   Ht 5' 10" (1.778 m)   Wt 76.7 kg (169 lb)   BMI 24.25 kg/m²     General: Well-developed, well-groomed. No apparent distress  HENT: Normocephalic, atraumatic.   Musculoskeletal: No peripheral edema, No joint swelling  Skin: No rash    Neurologic Exam:   Awake, alert and oriented x3  Short term memory intact  Praxis normal  Speech and voice Normal. Language is fluent.      CN II - CN XII:  PERRLA. EOM intact. No Nystagmus. No ophthalmoplegia. No ptosis today  Visual fields are affected by chronic DM and Stargardt. No central vision in R eye  Facial sensation is normal to light touch.   Facial expression is full and symmetric.   Hearing is intact bilaterally.   Palate elevates symmetrically.   SCM and Trapezius full strength bilaterally.   Tongue is midline.     Motor:  normal bulk and tone in all four limbs.   There are no atrophy or fasciculations.   Strength is 5/5 throughout.  No bradykinesia.  No dysdiadochokinesia.    Sensory:  Sensation to light touch: intact in BUE and BLE  Sensation to vibration: intact in BUE and BLE    DTRs:  2+ and symmetric throughout.    Babinski normal bilaterally.  No clonus.     Gait and Coordination:  Normal gait.  Normal tandem walking.  Finger to nose is normal bilaterally.      Lab and Test Results  No recent labs since 2018    Images:  N/A    Assessment and Plan    Problem List Items Addressed This Visit        Neuro    Myasthenia gravis, AChR antibody positive - Primary    Overview     Ab+, thymoma positive, s/p resection (2011), generalized MG, diagnosed 2011  Prednisone, mestinon, IVIG  Rescue: Plex, IVIG; both moderately helpful  Prior: Imuran -> marrow suppression         Current Assessment & Plan     Has lowered her mestinon from 30 TID to BID, remaisn clinically stable. patient is not interested in soliris at this time due to frequency of infusions and difficulty with transportation to infusion center.    - trial of " CARDIOLOGY ATTENDING    Patient seen and examined. Agree with above. She has a KNOWN  of the RCA fed by LAD collaterals. This is likely the culprit of her mild EKG changes and mild troponin elevation. Would trend troponin including CPKs/CPKMB and get repeat echo to assure she does not need an ICD. No need for repeat cath at this time. prednisone taper; decrease to 10 mg daily, advised to call if symptoms worsen   - continue Pyridostigmine 30 mg BID (morning + lunch)  - printed a list of medication (including anesthetics) contraindicated in MG and gave to patient to share with her surgeon and anesthesiologist  - will have her follow up in 3 months         Relevant Medications    predniSONE (DELTASONE) 10 MG tablet       Other    On prednisone therapy    Current Assessment & Plan     Chronic steroid use in a diabetic patient requires close monitoring.    - obtained during last visit: Hb A1c 6.2 (appropriate for age) and vitamin D 49 (normal)  - recommended annual ophthalmology exam   - will follow up on Dexa scan, pending                   Over 70% of this 30 minute visit was spent in direct face to face counseling of the patient about her symptoms, treatment options, and symptom management.         Savanah Moran MD  PGY-III, Neurology Resident  [facility]  [address] [place] [address]

## 2019-11-19 NOTE — ASSESSMENT & PLAN NOTE
Has lowered her mestinon from 30 TID to BID, remaisn clinically stable. patient is not interested in soliris at this time due to frequency of infusions and difficulty with transportation to infusion center.    - trial of prednisone taper; decrease to 10 mg daily, advised to call if symptoms worsen   - continue Pyridostigmine 30 mg BID (morning + lunch)  - printed a list of medication (including anesthetics) contraindicated in MG and gave to patient to share with her surgeon and anesthesiologist  - will have her follow up in 3 months

## 2019-11-27 ENCOUNTER — TELEPHONE (OUTPATIENT)
Dept: NEUROLOGY | Facility: CLINIC | Age: 64
End: 2019-11-27

## 2019-11-27 NOTE — TELEPHONE ENCOUNTER
----- Message from Jeanmarie Belle sent at 11/25/2019 10:29 AM CST -----  Contact: Lindsay (Dr. Red's Office) [phone]  Lindsay was calling to speak to someone regarding the patient's medication. They're trying to make sure she could take a certain antibiotic before her surgery tomorrow. Please contact the patient to discuss further.

## 2020-01-02 NOTE — Clinical Note
Will need to f/u in resident clinic. Wanda - can you provide our IVIg protocol to Dr Anderson in [place]? He has agreed to order it. She will need 2g/kg divided over 3 days. 
English

## 2020-01-07 ENCOUNTER — TELEPHONE (OUTPATIENT)
Dept: NEUROLOGY | Facility: CLINIC | Age: 65
End: 2020-01-07

## 2020-01-07 NOTE — TELEPHONE ENCOUNTER
Spoke to [facility] that indicated patient Part D benefits have changed and PA needed. Patient currently prescribed Gammagard. 50g per day over 3 days every 6 weeks. Patient's next IVIG infusion due 1/8/2020. Aj Fung noted will place on hold until approval obtained.     Outgoing call to [facility] to complete Prior Authorization. Diego, Representative, indicated that Gammagard is a non-formulary medication; therefore, may go through medical benefits.    Diego will initiate PA and fax form to MDs office for completion. Reference # [id number]

## 2020-02-10 ENCOUNTER — OFFICE VISIT (OUTPATIENT)
Dept: NEUROLOGY | Facility: CLINIC | Age: 65
End: 2020-02-10
Payer: MEDICARE

## 2020-02-10 VITALS
DIASTOLIC BLOOD PRESSURE: 72 MMHG | BODY MASS INDEX: 23.05 KG/M2 | HEIGHT: 70 IN | HEART RATE: 85 BPM | WEIGHT: 161 LBS | SYSTOLIC BLOOD PRESSURE: 116 MMHG

## 2020-02-10 DIAGNOSIS — E11.9 TYPE 2 DIABETES MELLITUS WITHOUT COMPLICATION, UNSPECIFIED WHETHER LONG TERM INSULIN USE: ICD-10-CM

## 2020-02-10 DIAGNOSIS — Z79.899 LONG-TERM CURRENT USE OF INTRAVENOUS IMMUNOGLOBULIN (IVIG): ICD-10-CM

## 2020-02-10 DIAGNOSIS — G70.00 MYASTHENIA GRAVIS, ACHR ANTIBODY POSITIVE: ICD-10-CM

## 2020-02-10 DIAGNOSIS — Z79.52 ON PREDNISONE THERAPY: Primary | ICD-10-CM

## 2020-02-10 DIAGNOSIS — E08.65 DIABETES MELLITUS DUE TO UNDERLYING CONDITION WITH HYPERGLYCEMIA, WITH LONG-TERM CURRENT USE OF INSULIN: ICD-10-CM

## 2020-02-10 DIAGNOSIS — Z79.4 DIABETES MELLITUS DUE TO UNDERLYING CONDITION WITH HYPERGLYCEMIA, WITH LONG-TERM CURRENT USE OF INSULIN: ICD-10-CM

## 2020-02-10 PROCEDURE — 99999 PR PBB SHADOW E&M-EST. PATIENT-LVL III: ICD-10-PCS | Mod: PBBFAC,GC,, | Performed by: STUDENT IN AN ORGANIZED HEALTH CARE EDUCATION/TRAINING PROGRAM

## 2020-02-10 PROCEDURE — 99213 OFFICE O/P EST LOW 20 MIN: CPT | Mod: S$PBB,GC,, | Performed by: STUDENT IN AN ORGANIZED HEALTH CARE EDUCATION/TRAINING PROGRAM

## 2020-02-10 PROCEDURE — 99999 PR PBB SHADOW E&M-EST. PATIENT-LVL III: CPT | Mod: PBBFAC,GC,, | Performed by: STUDENT IN AN ORGANIZED HEALTH CARE EDUCATION/TRAINING PROGRAM

## 2020-02-10 PROCEDURE — 99213 OFFICE O/P EST LOW 20 MIN: CPT | Mod: PBBFAC | Performed by: STUDENT IN AN ORGANIZED HEALTH CARE EDUCATION/TRAINING PROGRAM

## 2020-02-10 PROCEDURE — 99213 PR OFFICE/OUTPT VISIT, EST, LEVL III, 20-29 MIN: ICD-10-PCS | Mod: S$PBB,GC,, | Performed by: STUDENT IN AN ORGANIZED HEALTH CARE EDUCATION/TRAINING PROGRAM

## 2020-02-10 RX ORDER — PREDNISONE 10 MG/1
10 TABLET ORAL DAILY
Qty: 30 TABLET | Refills: 3 | Status: SHIPPED | OUTPATIENT
Start: 2020-02-10 | End: 2020-04-21 | Stop reason: SDUPTHER

## 2020-02-10 RX ORDER — PYRIDOSTIGMINE BROMIDE 60 MG/1
30 TABLET ORAL 2 TIMES DAILY
Qty: 120 TABLET | Refills: 5 | Status: SHIPPED | OUTPATIENT
Start: 2020-02-10 | End: 2020-08-31

## 2020-02-10 NOTE — PROGRESS NOTES
Neurology Clinic      Patient Name: Luoisa Quarles  MRN: [id number]    CC: Myasthenia Gravis     HPI: Louisa Quarles is a 64 y.o. RH female with DM-II and Ab+ oculobulbar Myasthenia Gravis (Dx 2011, s/p thymectomy + adjuvant RTX), who is presenting to the clinic today for f/u on MG.    Interval Hx (2/10/20):  Continues to complain of generalized muscle weakness. Reports mild worsening of swallowing and intermittent chocking on food. Denies diplopia or ptosis. Currently on Mestinon 30 mg BID, prednisone 10 mg daily and IVIG q6 weeks. She also reports continued medication side effect such as increased secretions and drooling (mestinon) and loss of taste (steroids). She thinks that the effect of IVIG does not last for the whole 6 weeks. She is still not interested in switching to Soliris or seeing neuromuscular specialist.      Interval Hx (11/18/19):  Patient is 20 minutes late to her appointment, however I spent 30 minutes with her. Overall feels ok, has good and bad days, depending on the day she has and/or stress levels.   She denies any double vision, SOB or choking on food or water unless she is eating fast. She feels weaker in the mornings rather than evenings. Continued on IVIG q6 weeks (due on 11/20), and mestinon 30 mg BID (morning and lunch), and prednisone 15 mg daily.  She is also inquiring about safety of rotator cuff surgery from MG standpoint, provided a print of contraindicated medication (including anesthetics) in myasthenic patients.   Patient is also inquiring about Soliris that we discussed last time. I provided her with printed information again. Explained to her that she should receive infusions weekly x4 weeks and on 5th week will switch to biweekly infusion, each taking 35 minutes. Patient is not interested at this time due to difficulty of transportation every week.       Initial encounter (8/29/19):  Ms Quarles is new to me, she was previously followed by Dr. Abrams and then Dr. Zhang  Klaus. She was last seen in the clinic in 5/14/19. Per review of charts and previous notes; patient was maintained on prednisone, Mestinon and Imuran in the past. Patient has received PLEX in the past with minimal response (last on Feb 2014). Imuran had to be discontinued in 2015 due to bone marrow suppression. Frequency and dosing of Mestinon and prednisone have been increased throughout the years due to progression of symptoms. Most recently she has been maintained on Prednisone 15 mg QD (tapered from 20 during last visit), Mestinon 30 mg TID; IVIG Q6 weeks (last dose 8/28).    Today she is reporting fluctuations in her symptoms, she has good and bad days (generalized weakness). She thinks IVIG might give her a boost for a couple of weeks and then she gradually gets back to where she was. She can not tell if tapering steroids has effected her symptoms. Today she denies any double vision, droopy eyelids, slurred speech, nasal speech or difficulty breathing. She intermittently chokes on solid food so she takes small bites. She complains of drooling and increased drainage from sinuses. She does not have any difficulty with walking long distances or working with arms. Recently her vision is more blurred and that causes her to have headaches. She has an appointment with her ophthalmologist in October. Patient also c/o losing the taste and dryness of lips.       Review of Systems:  12 system review is negative except as noted in HPI.        Past Medical History  Past Medical History:   Diagnosis Date    Cancer     Diabetes mellitus     Headache(784.0)     HEARING LOSS     History of thymectomy 7/16/2012    Memory loss     Myasthenia exacerbation     Myasthenia gravis with acute exacerbation     Myasthenia gravis with thymoma     Thymoma     Vision abnormalities     Stargadtch's disease/Macular degeneration -causes vision loss bilaterall, right worse than left        Medications    Current Outpatient  Medications:     ACCU-CHEK SHAKIRA PLUS METER Misc, , Disp: , Rfl:     ACCU-CHEK SHAKIRA PLUS Strp, , Disp: , Rfl:     ACCU-CHEK MULTICLIX LANCET lancets, , Disp: , Rfl:     alpha lipoic acid 600 mg Cap, Take by mouth once daily., Disp: , Rfl:     aspirin 81 MG chewable tablet, Take 81 mg by mouth once daily.  , Disp: , Rfl:     atorvastatin (LIPITOR) 20 MG tablet, 20 mg., Disp: , Rfl:     AZELASTINE/FLUTICASONE (DYMISTA NASL), by Nasal route., Disp: , Rfl:     calcium carbonate (OS-MAE) 600 mg (1,500 mg) Tab, Take 600 mg by mouth 2 (two) times daily with meals.  , Disp: , Rfl:     calcium citrate-vitamin D3 315-200 mg (CITRACAL+D) 315-200 mg-unit per tablet, Take by mouth 2 (two) times daily., Disp: , Rfl:     carvedilol (COREG) 3.125 MG tablet, Take 3.125 mg by mouth 2 (two) times daily with meals., Disp: , Rfl:     citalopram (CELEXA) 20 MG tablet, Take 20 mg by mouth once daily.  , Disp: , Rfl:     docusate sodium (COLACE) 100 MG capsule, Take 100 mg by mouth.  , Disp: , Rfl:     fluticasone propionate (FLONASE) 50 mcg/actuation nasal spray, , Disp: , Rfl: 11    HUMALOG KWIKPEN 100 unit/mL InPn pen, , Disp: , Rfl:     insulin aspart protamine-insulin aspart (NOVOLOG 70/30) 100 unit/mL (70-30) InPn, Inject 100 Units into the skin 3 (three) times daily.  , Disp: , Rfl:     insulin detemir (LEVEMIR) 100 unit/mL injection, Inject 100 Units into the skin every evening.  , Disp: , Rfl:     insulin glargine (LANTUS) 100 unit/mL Crtg, Inject 60 Units into the skin once daily.  , Disp: , Rfl:     ipratropium (ATROVENT) 0.03 % nasal spray, 2 sprays by Nasal route 2 (two) times daily., Disp: , Rfl:     JUBLIA 10 % Martin, , Disp: , Rfl:     magnesium oxide-Mg AA chelate (MG-PLUS-PROTEIN) 133 mg Tab, Take 250 mg by mouth., Disp: , Rfl:     melatonin 1 mg Tab, Take by mouth.  , Disp: , Rfl:     metformin (GLUCOPHAGE) 500 MG tablet, Take 500 mg by mouth before breakfast.  , Disp: , Rfl:     NOVOLOG FLEXPEN  100 unit/mL InPn pen, , Disp: , Rfl:     nystatin (MYCOSTATIN) 100,000 unit/mL suspension, 100,000 mg., Disp: , Rfl:     omeprazole (PRILOSEC) 40 MG capsule, Take 1 capsule (40 mg total) by mouth once daily., Disp: 30 capsule, Rfl: 10    oxycodone-acetaminophen (PERCOCET) 5-325 mg per tablet, Take 1 tablet by mouth every 6 (six) hours as needed.  , Disp: , Rfl:     potassium chloride (KLOR-CON) 20 mEq packet, Take 20 mEq by mouth once daily., Disp: 30 tablet, Rfl: 10    predniSONE (DELTASONE) 10 MG tablet, Take 1 tablet (10 mg total) by mouth once daily., Disp: 30 tablet, Rfl: 3    pyridostigmine (MESTINON) 60 mg Tab, Take 0.5 tablets (30 mg total) by mouth 2 (two) times daily., Disp: 120 tablet, Rfl: 5    ranitidine (ZANTAC) 75 MG tablet, Take 75 mg by mouth once daily., Disp: , Rfl:     travoprost (TRAVATAN Z) 0.004 % ophthalmic solution, Travatan Z 0.004 % eye drops, Disp: , Rfl:     TRESIBA FLEXTOUCH U-200 200 unit/mL (3 mL) InPn, , Disp: , Rfl:     vitamin D 185 MG Tab, Take 185 mg by mouth once daily.  , Disp: , Rfl:     zolpidem (AMBIEN) 10 mg Tab, 10 mg., Disp: , Rfl:     Current Facility-Administered Medications:     [START ON 3/20/2020] Immune Globulin G (IGG)-PRO-IGA 10 % injection (Privigen) 10 % injection 75 g, 1 g/kg, Intravenous, Q28 Days, Savanah Moran MD  Any other notable medications as documented in HPI    Allergies  Review of patient's allergies indicates:  No Known Allergies    Social History  Socioeconomic History    Marital status: Single   Tobacco Use    Smoking status: Former Smoker     Packs/day: 0.50     Years: 10.00     Pack years: 5.00     Types: Cigarettes     Last attempt to quit: 2011     Years since quittin.0   Substance and Sexual Activity    Alcohol use: No    Drug use: No    Sexual activity: Not on file     Any other notable Social History as documented in HPI.    Family History  Family History   Problem Relation Age of Onset    Cancer Sister          "breast    Diabetes Sister      Any other notable FMH as documented in HPI.    Physical Exam  /72   Pulse 85   Ht 5' 10" (1.778 m)   Wt 73 kg (161 lb)   BMI 23.10 kg/m²     General: Well-developed, well-groomed. No apparent distress  HENT: Normocephalic, atraumatic.   Musculoskeletal: No peripheral edema, No joint swelling  Skin: No rash    Neurologic Exam:   Awake, alert and oriented x3  PERRLA. EOM intact. No Nystagmus.   No ophthalmoplegia. No fatigable ptosis or diplopia   Visual fields are affected by chronic DM and Stargardt. No central vision in R eye  Facial sensation is normal to light touch.   Facial expression is full and symmetric.   SCM and Trapezius full strength bilaterally.   Tongue is midline.   Strength is 5/5 throughout.  Sensation to light touch: intact in BUE and BLE  2+ and symmetric throughout.    Normal gait.  Finger to nose is normal bilaterally.    Lab and Test Results  No recent labs since 2018  Patient follows up with her PCP in [place]     Images:  N/A    Assessment and Plan    Problem List Items Addressed This Visit        Neuro    Myasthenia gravis, AChR antibody positive    Overview     Ab+, thymoma positive, s/p resection (2011), generalized MG, diagnosed 2011  Prednisone, mestinon, IVIG  Rescue: Plex, IVIG; both moderately helpful  Prior: Imuran -> marrow suppression         Current Assessment & Plan     - discussed the option of Soliris again, patient not interested at this time  - strongly recommended referral to neuromuscular specialist however she strongly stated that she would like to think about it and would call us later  - continue prednisone at 10 mg daily  - continue Mestinon 30 mg qAM + qPM as needed (explained again that if she is asymptomatic she can skip her dose to prevent secretions)  - will increase the frequency of IVIG to q4 weeks (her next session is scheduled for 2/20), prescription in for [facility], will also send message to Wanda to assist " with arrangements with the infusion company  - ordered BMP to monitor renal function considering treatment with IVIG however she has done it recently and will fax the results  - follow up in clinic in 3 months         Relevant Medications    predniSONE (DELTASONE) 10 MG tablet       Endocrine    DM type 2 (diabetes mellitus, type 2)    Diabetes mellitus due to underlying condition with hyperglycemia     Relevant Orders    HEMOGLOBIN A1C       Other    On prednisone therapy - Primary    Current Assessment & Plan     - order in for HbA1c + vitamin D (considering steroids) however patient states that she has done all the labs recently with her pcp  - patient is scheduled for bone densitometry on Friday  - advised to fax lab results          Relevant Orders    VITAMIN D    HEMOGLOBIN A1C      Other Visit Diagnoses     Long-term current use of intravenous immunoglobulin (IVIG)        Relevant Orders    Basic metabolic panel          Over 70% of this 30 minute visit was spent in direct face to face counseling of the patient about her symptoms, treatment options, and symptom management.         Savanah Moran MD  PGY-III, Neurology Resident  [facility]  [address] [place] [address]

## 2020-02-10 NOTE — PATIENT INSTRUCTIONS
Please stop by at the lab on the second floor on your way out or fax the recent results to our clinic.    continue prednisone 10 mg daily. Will increase frequency of IVIG to every 4 weeks. Will speak with Wanda to arrange with your infusion company.

## 2020-02-11 ENCOUNTER — TELEPHONE (OUTPATIENT)
Dept: NEUROLOGY | Facility: CLINIC | Age: 65
End: 2020-02-11

## 2020-02-11 NOTE — ASSESSMENT & PLAN NOTE
- discussed the option of Soliris again, patient not interested at this time  - strongly recommended referral to neuromuscular specialist however she strongly stated that she would like to think about it and would call us later  - continue prednisone at 10 mg daily  - continue Mestinon 30 mg qAM + qPM as needed (explained again that if she is asymptomatic she can skip her dose to prevent secretions)  - will increase the frequency of IVIG to q4 weeks (her next session is scheduled for 2/20), prescription in for [facility], will also send message to Wanda to assist with arrangements with the infusion company  - ordered BMP to monitor renal function considering treatment with IVIG however she has done it recently and will fax the results  - follow up in clinic in 3 months

## 2020-02-11 NOTE — ASSESSMENT & PLAN NOTE
- order in for HbA1c (considering steroids) however patient states that she has done all the labs recently with her pcp  - advised to fax lab results

## 2020-02-11 NOTE — ASSESSMENT & PLAN NOTE
- order in for HbA1c + vitamin D (considering steroids) however patient states that she has done all the labs recently with her pcp  - patient is scheduled for bone densitometry on Friday  - advised to fax lab results

## 2020-02-11 NOTE — TELEPHONE ENCOUNTER
Outgoing call to [facility]. LENNIE Vanegas to follow up and return call re: IVIG Frequency change to every 4 weeks.

## 2020-04-07 ENCOUNTER — DOCUMENTATION ONLY (OUTPATIENT)
Dept: NEUROLOGY | Facility: CLINIC | Age: 65
End: 2020-04-07

## 2020-04-07 NOTE — PROGRESS NOTES
Confirmed with Kaveh (Pharmacist at [facility]) the new IVIg orders for Mrs. Quarles based upon her most recent visit with Vicente Moran and Servando:    IVIg 50g daily x 3 days (total 150g) every 4 weeks per home infusion. This is the same dose but an increase in frequency when compared to previous schedule, which was Q6 weeks. BMP to be done prior to infusions and results to be faxed to this office.    Hammad Perez MD  [facility]

## 2020-04-21 DIAGNOSIS — G70.00 MYASTHENIA GRAVIS, ACHR ANTIBODY POSITIVE: ICD-10-CM

## 2020-04-21 NOTE — TELEPHONE ENCOUNTER
----- Message from Aiyana Jose sent at 4/20/2020 10:22 AM CDT -----  Contact: patient  Please call above patient at [phone] said she need a refill on her prednisone 10mg waiting on a call from the nurse thanks.

## 2020-04-21 NOTE — TELEPHONE ENCOUNTER
----- Message from Aiyana Jose sent at 4/20/2020 10:22 AM CDT -----  Contact: patient  Please call above patient at [phone] said she need a refill on her prednisone 10mg waiting on a call from the nurse thanks.

## 2020-04-29 RX ORDER — PREDNISONE 10 MG/1
10 TABLET ORAL DAILY
Qty: 30 TABLET | Refills: 3 | Status: SHIPPED | OUTPATIENT
Start: 2020-04-29 | End: 2020-08-25 | Stop reason: SDUPTHER

## 2020-05-06 ENCOUNTER — TELEPHONE (OUTPATIENT)
Dept: NEUROLOGY | Facility: CLINIC | Age: 65
End: 2020-05-06

## 2020-05-06 NOTE — TELEPHONE ENCOUNTER
----- Message from Savanah Moran MD sent at 5/4/2020  6:51 PM CDT -----  Regarding: virtual julian  Shad mason, will please offer her virtual visit. She usually comes form [place] and has no acute issues. Thanks

## 2020-05-06 NOTE — TELEPHONE ENCOUNTER
She called last week and requested a audio visit. Doesn't wish to have a [facility] account.

## 2020-05-11 ENCOUNTER — OFFICE VISIT (OUTPATIENT)
Dept: NEUROLOGY | Facility: CLINIC | Age: 65
End: 2020-05-11
Payer: MEDICARE

## 2020-05-11 DIAGNOSIS — G70.00 MYASTHENIA GRAVIS, ACHR ANTIBODY POSITIVE: ICD-10-CM

## 2020-05-11 DIAGNOSIS — Z79.52 ON PREDNISONE THERAPY: Primary | ICD-10-CM

## 2020-05-11 PROCEDURE — 99442 PR PHYSICIAN TELEPHONE EVALUATION 11-20 MIN: CPT | Mod: 95,GC,, | Performed by: PSYCHIATRY & NEUROLOGY

## 2020-05-11 PROCEDURE — 99442 PR PHYSICIAN TELEPHONE EVALUATION 11-20 MIN: ICD-10-PCS | Mod: 95,GC,, | Performed by: PSYCHIATRY & NEUROLOGY

## 2020-05-11 NOTE — ASSESSMENT & PLAN NOTE
- Had not done her monitoring labs at [facility] (HbA1c + vitamin D but patient states that she has done all the labs recently with her pcp, advised again to fax the results to us  - Bone densitometry has been done and reportedly showed osteoporosis, PCP has increased D3 from 2000 to 5000 units daily

## 2020-05-11 NOTE — ASSESSMENT & PLAN NOTE
Currently stable symptoms, generalized weakness and fatigue responded well to increasing IVIG frequency.    - Continue prednisone at 10 mg daily + mestinon 30 mg twice daily PRN  - Continue IVIG to q4 weeks  - Strongly advised to have her PCP fax BMP results to us  - will schedule an appointment with Dr. Perez (neuromuscular specialist) in 3-4 months to better advise on Sliris or immunosuppressants to help with weaning steroids  - Will send info on setting up [facility] again for future virtual appointments if needed

## 2020-05-11 NOTE — PROGRESS NOTES
Established Patient - Audio Only Telehealth Visit     The patient location is: [place] ([facility])  The chief complaint leading to consultation is: MG - follow up  Visit type: Virtual visit with audio only (telephone)  Total time spent with patient: 20 minutes       The reason for the audio only service rather than synchronous audio and video virtual visit was related to technical difficulties or patient preference/necessity.     Each patient to whom I provide medical services by telemedicine is:  (1) informed of the relationship between the physician and patient and the respective role of any other health care provider with respect to management of the patient; and (2) notified that they may decline to receive medical services by telemedicine and may withdraw from such care at any time. Patient verbally consented to receive this service via voice-only telephone call.       Neurology Telemedicine      Patient Name: Louisa Quarles  MRN: [id number]    CC: Myasthenia Gravis     HPI: Louisa Quarles is a 65 y.o. RH female with DM-II and Ab+ oculobulbar Myasthenia Gravis (Dx 2011, s/p thymectomy + adjuvant RTX).    Interval Hx (5/11/20):  She denies eyelid drooping, no double vision. Takes mestinon 30 mg tab twice daily. She was off of it for a while but resumed it due to perceived MG-related drooling. She is due for IVIG tomorrow (5/12), the fatigue and generalized weakness has improved after increasing the IVIG frequency to q4w. She forgot that she was supposed to fax bone density and lab results to us. She is willing to be referred to neuromuscular specialist upon revisiting the topic.      Interval Hx (2/10/20):  Continues to complain of generalized muscle weakness. Reports mild worsening of swallowing and intermittent chocking on food. Denies diplopia or ptosis. Currently on Mestinon 30 mg BID, prednisone 10 mg daily and IVIG q6 weeks. She also reports continued medication side effect such as increased secretions  and drooling (mestinon) and loss of taste (steroids). She thinks that the effect of IVIG does not last for the whole 6 weeks. She is still not interested in switching to Soliris or seeing neuromuscular specialist.      Interval Hx (11/18/19):  Overall feels ok, has good and bad days, depending on the day she has and/or stress levels. She denies any double vision, SOB or choking on food or water unless she is eating fast. She feels weaker in the mornings rather than evenings. Continued on IVIG q6 weeks (due on 11/20), and mestinon 30 mg BID (morning and lunch), and prednisone 15 mg daily.  She is also inquiring about safety of rotator cuff surgery from MG standpoint, provided a print of contraindicated medication (including anesthetics) in myasthenic patients.   Patient is also inquiring about Soliris that we discussed last time. I provided her with printed information again. Explained to her that she should receive infusions weekly x4 weeks and on 5th week will switch to biweekly infusion, each taking 35 minutes. Patient is not interested at this time due to difficulty of transportation every week.       Initial encounter (8/29/19):  Ms Quarles is new to me, she was previously followed by Dr. Abrams and then Dr. Montilla. She was last seen in the clinic in 5/14/19. Per review of charts and previous notes; patient was maintained on prednisone, Mestinon and Imuran in the past. Patient has received PLEX in the past with minimal response (last on Feb 2014). Imuran had to be discontinued in 2015 due to bone marrow suppression. Frequency and dosing of Mestinon and prednisone have been increased throughout the years due to progression of symptoms. Most recently she has been maintained on Prednisone 15 mg QD (tapered from 20 during last visit), Mestinon 30 mg TID; IVIG Q6 weeks (last dose 8/28).    Today she is reporting fluctuations in her symptoms, she has good and bad days (generalized weakness). She thinks IVIG  might give her a boost for a couple of weeks and then she gradually gets back to where she was. She can not tell if tapering steroids has effected her symptoms. Today she denies any double vision, droopy eyelids, slurred speech, nasal speech or difficulty breathing. She intermittently chokes on solid food so she takes small bites. She complains of drooling and increased drainage from sinuses. She does not have any difficulty with walking long distances or working with arms. Recently her vision is more blurred and that causes her to have headaches. She has an appointment with her ophthalmologist in October. Patient also c/o losing the taste and dryness of lips.       Review of Systems:  12 system review is negative except as noted in HPI.        Past Medical History  Past Medical History:   Diagnosis Date    Cancer     Diabetes mellitus     Headache(784.0)     HEARING LOSS     History of thymectomy 7/16/2012    Memory loss     Myasthenia exacerbation     Myasthenia gravis with acute exacerbation     Myasthenia gravis with thymoma     Thymoma     Vision abnormalities     Stargadtch's disease/Macular degeneration -causes vision loss bilaterall, right worse than left        Medications    Current Outpatient Medications:     ACCU-CHEK SHAKIRA PLUS METER Misc, , Disp: , Rfl:     ACCU-CHEK SHAKIRA PLUS Strp, , Disp: , Rfl:     ACCU-CHEK MULTICLIX LANCET lancets, , Disp: , Rfl:     alpha lipoic acid 600 mg Cap, Take by mouth once daily., Disp: , Rfl:     aspirin 81 MG chewable tablet, Take 81 mg by mouth once daily.  , Disp: , Rfl:     atorvastatin (LIPITOR) 20 MG tablet, 20 mg., Disp: , Rfl:     AZELASTINE/FLUTICASONE (DYMISTA NASL), by Nasal route., Disp: , Rfl:     calcium carbonate (OS-MAE) 600 mg (1,500 mg) Tab, Take 600 mg by mouth 2 (two) times daily with meals.  , Disp: , Rfl:     calcium citrate-vitamin D3 315-200 mg (CITRACAL+D) 315-200 mg-unit per tablet, Take by mouth 2 (two) times daily., Disp:  , Rfl:     carvedilol (COREG) 3.125 MG tablet, Take 3.125 mg by mouth 2 (two) times daily with meals., Disp: , Rfl:     citalopram (CELEXA) 20 MG tablet, Take 20 mg by mouth once daily.  , Disp: , Rfl:     docusate sodium (COLACE) 100 MG capsule, Take 100 mg by mouth.  , Disp: , Rfl:     fluticasone propionate (FLONASE) 50 mcg/actuation nasal spray, , Disp: , Rfl: 11    HUMALOG KWIKPEN 100 unit/mL InPn pen, , Disp: , Rfl:     insulin aspart protamine-insulin aspart (NOVOLOG 70/30) 100 unit/mL (70-30) InPn, Inject 100 Units into the skin 3 (three) times daily.  , Disp: , Rfl:     insulin detemir (LEVEMIR) 100 unit/mL injection, Inject 100 Units into the skin every evening.  , Disp: , Rfl:     insulin glargine (LANTUS) 100 unit/mL Crtg, Inject 60 Units into the skin once daily.  , Disp: , Rfl:     ipratropium (ATROVENT) 0.03 % nasal spray, 2 sprays by Nasal route 2 (two) times daily., Disp: , Rfl:     JUBLIA 10 % Martin, , Disp: , Rfl:     magnesium oxide-Mg AA chelate (MG-PLUS-PROTEIN) 133 mg Tab, Take 250 mg by mouth., Disp: , Rfl:     melatonin 1 mg Tab, Take by mouth.  , Disp: , Rfl:     metformin (GLUCOPHAGE) 500 MG tablet, Take 500 mg by mouth before breakfast.  , Disp: , Rfl:     NOVOLOG FLEXPEN 100 unit/mL InPn pen, , Disp: , Rfl:     nystatin (MYCOSTATIN) 100,000 unit/mL suspension, 100,000 mg., Disp: , Rfl:     omeprazole (PRILOSEC) 40 MG capsule, Take 1 capsule (40 mg total) by mouth once daily., Disp: 30 capsule, Rfl: 10    oxycodone-acetaminophen (PERCOCET) 5-325 mg per tablet, Take 1 tablet by mouth every 6 (six) hours as needed.  , Disp: , Rfl:     potassium chloride (KLOR-CON) 20 mEq packet, Take 20 mEq by mouth once daily., Disp: 30 tablet, Rfl: 10    predniSONE (DELTASONE) 10 MG tablet, Take 1 tablet (10 mg total) by mouth once daily., Disp: 30 tablet, Rfl: 3    pyridostigmine (MESTINON) 60 mg Tab, Take 0.5 tablets (30 mg total) by mouth 2 (two) times daily., Disp: 120 tablet, Rfl:  5    ranitidine (ZANTAC) 75 MG tablet, Take 75 mg by mouth once daily., Disp: , Rfl:     travoprost (TRAVATAN Z) 0.004 % ophthalmic solution, Travatan Z 0.004 % eye drops, Disp: , Rfl:     TRESIBA FLEXTOUCH U-200 200 unit/mL (3 mL) InPn, , Disp: , Rfl:     vitamin D 185 MG Tab, Take 185 mg by mouth once daily.  , Disp: , Rfl:     zolpidem (AMBIEN) 10 mg Tab, 10 mg., Disp: , Rfl:     Current Facility-Administered Medications:     Immune Globulin G (IGG)-PRO-IGA 10 % injection (Privigen) 10 % injection 75 g, 1 g/kg, Intravenous, Q28 Days, Savanah Moran MD  Any other notable medications as documented in HPI    Allergies  Review of patient's allergies indicates:  No Known Allergies    Social History  Socioeconomic History    Marital status: Single   Tobacco Use    Smoking status: Former Smoker     Packs/day: 0.50     Years: 10.00     Pack years: 5.00     Types: Cigarettes     Last attempt to quit: 2011     Years since quittin.0   Substance and Sexual Activity    Alcohol use: No    Drug use: No    Sexual activity: Not on file     Any other notable Social History as documented in HPI.    Family History  Family History   Problem Relation Age of Onset    Cancer Sister         breast    Diabetes Sister      Any other notable FMH as documented in HPI.    Physical Exam  Unable to perform over the audio visit.    Lab and Test Results  No recent labs since 2018  Patient follows up with her PCP in [place]     Images:  N/A    Assessment and Plan    Problem List Items Addressed This Visit        Neuro    Myasthenia gravis, AChR antibody positive    Overview     Ab+, thymoma positive, s/p resection (), generalized MG, diagnosed   Prednisone, mestinon, IVIG  Rescue: Plex, IVIG; both moderately helpful  Prior: Imuran -> marrow suppression         Current Assessment & Plan     Currently stable symptoms, generalized weakness and fatigue responded well to increasing IVIG frequency.    - Continue  prednisone at 10 mg daily + mestinon 30 mg twice daily PRN  - Continue IVIG to q4 weeks  - Strongly advised to have her PCP fax BMP results to us  - will schedule an appointment with Dr. Perez (neuromuscular specialist) in 3-4 months to better advise on Sliris or immunosuppressants to help with weaning steroids  - Will send info on setting up [facility] again for future virtual appointments if needed            Other    On prednisone therapy - Primary    Current Assessment & Plan     - Had not done her monitoring labs at [facility] (HbA1c + vitamin D but patient states that she has done all the labs recently with her pcp, advised again to fax the results to us  - Bone densitometry has been done and reportedly showed osteoporosis, PCP has increased D3 from 2000 to 5000 units daily                     Savanah Moran MD  PGY-III, Neurology Resident  [facility] [address] [place] [address]       This service was not originating from a related E/M service provided within the previous 7 days nor will  to an E/M service or procedure within the next 24 hours or my soonest available appointment.  Prevailing standard of care was able to be met in this audio-only visit.

## 2020-08-25 DIAGNOSIS — G70.00 MYASTHENIA GRAVIS, ACHR ANTIBODY POSITIVE: ICD-10-CM

## 2020-08-25 RX ORDER — PREDNISONE 10 MG/1
10 TABLET ORAL DAILY
Qty: 30 TABLET | Refills: 3 | Status: SHIPPED | OUTPATIENT
Start: 2020-08-25 | End: 2020-12-23

## 2020-08-25 NOTE — TELEPHONE ENCOUNTER
----- Message from Justin Ospina sent at 8/25/2020  9:49 AM CDT -----  Rx Refill/Request     Is this a Refill or New Rx: Refill  Rx Name and Strength: predniSONE (DELTASONE) 10 MG tablet   Preferred Pharmacy with phone number: see below  Communication Preference: [phone]   Additional Information:     [facility] [place], [place] - [address] [address]  Phone: [phone] Fax: [phone]

## 2020-08-31 ENCOUNTER — OFFICE VISIT (OUTPATIENT)
Dept: NEUROLOGY | Facility: CLINIC | Age: 65
End: 2020-08-31
Payer: MEDICARE

## 2020-08-31 VITALS — BODY MASS INDEX: 23.51 KG/M2 | HEIGHT: 70 IN | WEIGHT: 164.25 LBS

## 2020-08-31 DIAGNOSIS — G70.00 MYASTHENIA GRAVIS, ACHR ANTIBODY POSITIVE: Primary | ICD-10-CM

## 2020-08-31 DIAGNOSIS — Z79.52 ON PREDNISONE THERAPY: ICD-10-CM

## 2020-08-31 DIAGNOSIS — Z90.89 HISTORY OF THYMECTOMY: ICD-10-CM

## 2020-08-31 DIAGNOSIS — E78.49 OTHER HYPERLIPIDEMIA: Chronic | ICD-10-CM

## 2020-08-31 DIAGNOSIS — I10 ESSENTIAL HYPERTENSION: Chronic | ICD-10-CM

## 2020-08-31 DIAGNOSIS — E11.9 TYPE 2 DIABETES MELLITUS WITHOUT COMPLICATION, UNSPECIFIED WHETHER LONG TERM INSULIN USE: ICD-10-CM

## 2020-08-31 PROCEDURE — 99999 PR PBB SHADOW E&M-EST. PATIENT-LVL IV: ICD-10-PCS | Mod: PBBFAC,,, | Performed by: PSYCHIATRY & NEUROLOGY

## 2020-08-31 PROCEDURE — 99999 PR PBB SHADOW E&M-EST. PATIENT-LVL IV: CPT | Mod: PBBFAC,,, | Performed by: PSYCHIATRY & NEUROLOGY

## 2020-08-31 PROCEDURE — 99215 PR OFFICE/OUTPT VISIT, EST, LEVL V, 40-54 MIN: ICD-10-PCS | Mod: S$PBB,,, | Performed by: PSYCHIATRY & NEUROLOGY

## 2020-08-31 PROCEDURE — 99215 OFFICE O/P EST HI 40 MIN: CPT | Mod: S$PBB,,, | Performed by: PSYCHIATRY & NEUROLOGY

## 2020-08-31 PROCEDURE — 99214 OFFICE O/P EST MOD 30 MIN: CPT | Mod: PBBFAC | Performed by: PSYCHIATRY & NEUROLOGY

## 2020-08-31 RX ORDER — PYRIDOSTIGMINE BROMIDE 60 MG/1
60 TABLET ORAL EVERY 4 HOURS PRN
Qty: 120 TABLET | Refills: 11 | Status: SHIPPED | OUTPATIENT
Start: 2020-08-31 | End: 2021-09-25 | Stop reason: SDUPTHER

## 2020-08-31 NOTE — LETTER
September 1, 2020      Savanah Moran MD  [address]  [facility] [address]           [facility] - Neurology 7th Fl  [address]  [facility] [address]  Phone: [phone]  Fax: [phone]          Patient: Louisa Quarles   MR Number: [id number]   YOB: 1955   Date of Visit: 8/31/2020       Dear Dr. Savanah Moran:    Thank you for referring Louisa Quarles to me for evaluation. Attached you will find relevant portions of my assessment and plan of care.    If you have questions, please do not hesitate to call me. I look forward to following Louisa Quarles along with you.    Sincerely,    Roge Perez MD    Enclosure  CC:  No Recipients    If you would like to receive this communication electronically, please contact [email or web address] or [phone] to request more information on [employer] access.    For providers and/or their staff who would like to refer a patient to [facility], please contact us through our one-stop-shop provider referral line, [facility], at [phone].    If you feel you have received this communication in error or would no longer like to receive these types of communications, please e-mail [email or web address]

## 2020-09-01 PROBLEM — E78.49 OTHER HYPERLIPIDEMIA: Chronic | Status: ACTIVE | Noted: 2020-09-01

## 2020-09-01 PROBLEM — I10 ESSENTIAL HYPERTENSION: Chronic | Status: ACTIVE | Noted: 2020-09-01

## 2020-09-01 NOTE — ASSESSMENT & PLAN NOTE
Seems well-controlled with the recent increase in IVIg frequency from Q6 weeks to Q4 weeks. Still receiving 150g total divided over 3 days per [facility] in [place]. On Prednisone 10 mg daily and asking about non-steroidal therapy. She has tried Imuran and had to stop due to marrow suppression. She is potentially interested in eculizumab and we had a long discussion about the therapy protocol, required immunizations, and possibility of home infusions vs needing to come to [facility] for infusions. She is currently well-controlled and managing steroid-related side effects fairly well. She will contact me after 2 more IVIg rounds to see if her management continues to be well, or if she wants to pursue a change to eculizumab with the hopes of reducing / stopping steroids therapy (and likely IVIg therapy).   - Continue current IVIg and Prednisone therapy for now;   - Use Mestinon 30-60 mg PO Q4 hours as needed for MG-related weaknesses. Rx refilled.

## 2020-09-01 NOTE — PROGRESS NOTES
"  Subjective:     Chief Complaint:  Consult (ACh-R Ab+ Generalized Myasthenia Gravis)        History of Present Illness:  I have reviewed all relevant medical records in Epic.     Louisa Quarles is a 65 y.o. female who presents today for initial visit with me for AChRAb+ myasthenia gravis, previously followed by [facility] and in [facility]. Maintained on IVIg and Prednisone 10 mg daily. She has recently had her IVIg regimen changed to 50g daily x 3 days every 4 weeks per [facility] (previously on same dose Q6 weeks). She has noticed an improvement in her strength overall, though does report some days of feeling "worn down" but cannot elaborate or specify exactly which groups of muscles or which functions are impaired. She has been taking Mestinon occasionally and is unaware that it is meant to be used as PRN for temporary reversal of MG-related weaknesses. She denies any dysphagia or dysarthria or dysphonia. She has occasional diplopia but no ptosis. She has been exercising at home and reports occasional UE and LE weakness. She had been experiencing relapses in weakness toward the end of her 6 week interval in between Ig infusions. With the change to Q4 weeks, she notes a decline in that phenomenon. She tolerates the infusions well, though occasionally gets headaches during and after. These respond well to Tylenol 1g. She reports good hydration the day prior to her IVIg.    She had thymectomy in 2012, no thymoma  MG diagnosis in about 2011  Some osteoporosis on most recent dexa scan, now on vitamin D supplements per PCP    Secondary stroke risk factors of DM2, HLD, HTN    From Previous Notes:    (Dx 2011, s/p thymectomy + adjuvant RTX), who is presenting to the clinic today for f/u on MG.     Interval Hx (2/10/20):  Continues to complain of generalized muscle weakness. Reports mild worsening of swallowing and intermittent chocking on food. Denies diplopia or ptosis. Currently on Mestinon 30 mg BID, " prednisone 10 mg daily and IVIG q6 weeks. She also reports continued medication side effect such as increased secretions and drooling (mestinon) and loss of taste (steroids). She thinks that the effect of IVIG does not last for the whole 6 weeks. She is still not interested in switching to Soliris or seeing neuromuscular specialist.        Interval Hx (11/18/19):  Patient is 20 minutes late to her appointment, however I spent 30 minutes with her. Overall feels ok, has good and bad days, depending on the day she has and/or stress levels.   She denies any double vision, SOB or choking on food or water unless she is eating fast. She feels weaker in the mornings rather than evenings. Continued on IVIG q6 weeks (due on 11/20), and mestinon 30 mg BID (morning and lunch), and prednisone 15 mg daily.  She is also inquiring about safety of rotator cuff surgery from MG standpoint, provided a print of contraindicated medication (including anesthetics) in myasthenic patients.   Patient is also inquiring about Soliris that we discussed last time. I provided her with printed information again. Explained to her that she should receive infusions weekly x4 weeks and on 5th week will switch to biweekly infusion, each taking 35 minutes. Patient is not interested at this time due to difficulty of transportation every week.         Initial encounter (8/29/19):  Ms Quarles is new to me, she was previously followed by Dr. Abrams and then Dr. Montilla. She was last seen in the clinic in 5/14/19. Per review of charts and previous notes; patient was maintained on prednisone, Mestinon and Imuran in the past. Patient has received PLEX in the past with minimal response (last on Feb 2014). Imuran had to be discontinued in 2015 due to bone marrow suppression. Frequency and dosing of Mestinon and prednisone have been increased throughout the years due to progression of symptoms. Most recently she has been maintained on Prednisone 15 mg QD  (tapered from 20 during last visit), Mestinon 30 mg TID; IVIG Q6 weeks (last dose 8/28).     Today she is reporting fluctuations in her symptoms, she has good and bad days (generalized weakness). She thinks IVIG might give her a boost for a couple of weeks and then she gradually gets back to where she was. She can not tell if tapering steroids has effected her symptoms. Today she denies any double vision, droopy eyelids, slurred speech, nasal speech or difficulty breathing. She intermittently chokes on solid food so she takes small bites. She complains of drooling and increased drainage from sinuses. She does not have any difficulty with walking long distances or working with arms. Recently her vision is more blurred and that causes her to have headaches. She has an appointment with her ophthalmologist in October. Patient also c/o losing the taste and dryness of lips.     Review of Systems  Review of Systems   Constitutional: Negative for activity change, fatigue and fever.   HENT: Negative for hearing loss, trouble swallowing and voice change.    Eyes: Positive for visual disturbance. Negative for pain and redness.   Respiratory: Negative for choking, chest tightness and shortness of breath.    Cardiovascular: Negative for chest pain.   Gastrointestinal: Negative for abdominal pain, nausea and vomiting.   Endocrine: Negative for cold intolerance.   Genitourinary: Negative for frequency.   Musculoskeletal: Negative for arthralgias, back pain, gait problem, joint swelling, myalgias and neck pain.   Skin: Negative for color change.   Allergic/Immunologic: Negative for immunocompromised state.   Neurological: Positive for weakness. Negative for dizziness, seizures, speech difficulty, numbness and headaches.   Hematological: Negative for adenopathy.   Psychiatric/Behavioral: Negative for agitation, behavioral problems, dysphoric mood and suicidal ideas.        Objective:     Vitals:    08/31/20 1303   Weight: 74.5 kg  "(164 lb 3.9 oz)   Height: 5' 10" (1.778 m)   PainSc: 0-No pain       Neurologic Exam     Mental Status   Oriented to person, place, and time.   Attention: normal.   Speech: speech is normal   Level of consciousness: alert  Knowledge: good.     Cranial Nerves     CN II   Visual fields full to confrontation.     CN III, IV, VI   Pupils are equal, round, and reactive to light.  Extraocular motions are normal.   Diplopia: bilateral and horizontal    CN V   Facial sensation intact.     CN VII   Facial expression full, symmetric.     CN VIII   Hearing: intact    CN IX, X   CN IX normal.     CN XI   CN XI normal.     CN XII   CN XII normal.     Maintains upward gaze 30+ seconds without drop.     Motor Exam   Muscle bulk: normal  Overall muscle tone: normal    Strength   Strength 5/5 throughout. No decrease in deltoid, biceps, triceps, hip flexors strength with repeated resistance.     Sensory Exam   Light touch normal.   Vibration normal.     Gait, Coordination, and Reflexes     Gait  Gait: normal    Tremor   Resting tremor: absent  Intention tremor: absent  Action tremor: absent    Reflexes   Right brachioradialis: 1+  Left brachioradialis: 1+  Right biceps: 1+  Left biceps: 1+  Right triceps: 1+  Left triceps: 1+  Right patellar: 1+  Left patellar: 1+      Physical Exam  Vitals signs reviewed.   Constitutional:       Appearance: She is well-developed.   HENT:      Head: Normocephalic and atraumatic.   Eyes:      Extraocular Movements: EOM normal.      Pupils: Pupils are equal, round, and reactive to light.   Neck:      Musculoskeletal: Normal range of motion and neck supple.      Thyroid: No thyromegaly.   Cardiovascular:      Rate and Rhythm: Normal rate.   Pulmonary:      Effort: Pulmonary effort is normal.   Abdominal:      Palpations: Abdomen is soft.   Lymphadenopathy:      Cervical: No cervical adenopathy.   Skin:     General: Skin is warm and dry.   Neurological:      Mental Status: She is oriented to person, " place, and time.      Gait: Gait is intact.      Deep Tendon Reflexes: Strength normal.      Reflex Scores:       Tricep reflexes are 1+ on the right side and 1+ on the left side.       Bicep reflexes are 1+ on the right side and 1+ on the left side.       Brachioradialis reflexes are 1+ on the right side and 1+ on the left side.       Patellar reflexes are 1+ on the right side and 1+ on the left side.  Psychiatric:         Speech: Speech normal.         Behavior: Behavior normal.         Thought Content: Thought content normal.           Lab Results   Component Value Date    WBC 7.11 07/10/2018    HGB 14.4 07/10/2018    HCT 43.4 07/10/2018     07/10/2018    ALT 32 07/10/2018    AST 29 07/10/2018     07/10/2018    K 5.2 (H) 07/10/2018     07/10/2018    CREATININE 1.1 07/10/2018    BUN 19 07/10/2018    CO2 30 (H) 07/10/2018    TSH 1.379 01/09/2014    HGBA1C 6.2 (H) 08/29/2019    [id number] >1150 (H) 09/30/2011         Assessment and Plan:     Problem List Items Addressed This Visit     Essential hypertension (Chronic)    Current Assessment & Plan     On appropriate medications and managed by PCP. We discussed the importance of managing all secondary stroke risk factors, including hypertension.           Other hyperlipidemia (Chronic)    Current Assessment & Plan     On appropriate medications and managed by PCP. We discussed the importance of managing all secondary stroke risk factors, including hyperlipidemia.           Myasthenia gravis, AChR antibody positive - Primary    Overview     Ab+, thymoma positive, s/p resection (2011), generalized MG, diagnosed 2011  Prednisone, mestinon, IVIG  Rescue: Plex, IVIG; both moderately helpful  Prior: Imuran -> marrow suppression         Current Assessment & Plan     Seems well-controlled with the recent increase in IVIg frequency from Q6 weeks to Q4 weeks. Still receiving 150g total divided over 3 days per [facility] in [place]. On Prednisone 10 mg daily and  asking about non-steroidal therapy. She has tried Imuran and had to stop due to marrow suppression. She is potentially interested in eculizumab and we had a long discussion about the therapy protocol, required immunizations, and possibility of home infusions vs needing to come to [facility] for infusions. She is currently well-controlled and managing steroid-related side effects fairly well. She will contact me after 2 more IVIg rounds to see if her management continues to be well, or if she wants to pursue a change to eculizumab with the hopes of reducing / stopping steroids therapy (and likely IVIg therapy).   - Continue current IVIg and Prednisone therapy for now;   - Use Mestinon 30-60 mg PO Q4 hours as needed for MG-related weaknesses. Rx refilled.         Relevant Medications    pyridostigmine (MESTINON) 60 mg Tab    History of thymectomy    Overview     November 2011  Thymoma+  S/p radiation therapy         DM type 2 (diabetes mellitus, type 2)    Current Assessment & Plan     On appropriate medications and managed by PCP. We discussed the importance of managing all secondary stroke risk factors, including DM2.           On prednisone therapy        RTC in 6 months. Communicate medications efficacy via Portal.    Hammad Perez MD  [facility]

## 2021-02-23 ENCOUNTER — TELEPHONE (OUTPATIENT)
Dept: NEUROLOGY | Facility: CLINIC | Age: 66
End: 2021-02-23

## 2021-04-20 NOTE — ASSESSMENT & PLAN NOTE
On appropriate medications and managed by PCP. We discussed the importance of managing all secondary stroke risk factors, including DM2.     Calcipotriene Counseling:  I discussed with the patient the risks of calcipotriene including but not limited to erythema, scaling, itching, and irritation.

## 2021-05-10 ENCOUNTER — TELEPHONE (OUTPATIENT)
Dept: NEUROLOGY | Facility: CLINIC | Age: 66
End: 2021-05-10

## 2021-05-13 ENCOUNTER — CLINICAL SUPPORT (OUTPATIENT)
Dept: INFECTIOUS DISEASES | Facility: CLINIC | Age: 66
End: 2021-05-13
Payer: MEDICARE

## 2021-05-13 ENCOUNTER — OFFICE VISIT (OUTPATIENT)
Dept: NEUROLOGY | Facility: CLINIC | Age: 66
End: 2021-05-13
Payer: MEDICARE

## 2021-05-13 VITALS
SYSTOLIC BLOOD PRESSURE: 137 MMHG | HEIGHT: 70 IN | DIASTOLIC BLOOD PRESSURE: 72 MMHG | HEART RATE: 86 BPM | BODY MASS INDEX: 21.24 KG/M2 | WEIGHT: 148.38 LBS

## 2021-05-13 DIAGNOSIS — I10 ESSENTIAL HYPERTENSION: Chronic | ICD-10-CM

## 2021-05-13 DIAGNOSIS — Z90.89 HISTORY OF THYMECTOMY: ICD-10-CM

## 2021-05-13 DIAGNOSIS — E11.21 TYPE 2 DIABETES MELLITUS WITH DIABETIC NEPHROPATHY, WITHOUT LONG-TERM CURRENT USE OF INSULIN: ICD-10-CM

## 2021-05-13 DIAGNOSIS — Z79.52 ON PREDNISONE THERAPY: ICD-10-CM

## 2021-05-13 DIAGNOSIS — E78.49 OTHER HYPERLIPIDEMIA: Chronic | ICD-10-CM

## 2021-05-13 DIAGNOSIS — G70.00 MYASTHENIA GRAVIS, ACHR ANTIBODY POSITIVE: Primary | ICD-10-CM

## 2021-05-13 PROCEDURE — 99999 PR PBB SHADOW E&M-EST. PATIENT-LVL III: CPT | Mod: PBBFAC,,, | Performed by: PSYCHIATRY & NEUROLOGY

## 2021-05-13 PROCEDURE — 99215 OFFICE O/P EST HI 40 MIN: CPT | Mod: S$PBB,,, | Performed by: PSYCHIATRY & NEUROLOGY

## 2021-05-13 PROCEDURE — 99999 PR PBB SHADOW E&M-EST. PATIENT-LVL III: ICD-10-PCS | Mod: PBBFAC,,, | Performed by: PSYCHIATRY & NEUROLOGY

## 2021-05-13 PROCEDURE — 99215 PR OFFICE/OUTPT VISIT, EST, LEVL V, 40-54 MIN: ICD-10-PCS | Mod: S$PBB,,, | Performed by: PSYCHIATRY & NEUROLOGY

## 2021-05-13 PROCEDURE — 90734 MENACWYD/MENACWYCRM VACC IM: CPT | Mod: PBBFAC

## 2021-05-13 PROCEDURE — 99213 OFFICE O/P EST LOW 20 MIN: CPT | Mod: PBBFAC,25 | Performed by: PSYCHIATRY & NEUROLOGY

## 2021-05-13 PROCEDURE — 90620 MENB-4C VACCINE IM: CPT | Mod: PBBFAC

## 2021-05-13 PROCEDURE — 90472 IMMUNIZATION ADMIN EACH ADD: CPT | Mod: PBBFAC

## 2021-05-24 DIAGNOSIS — G70.00 MYASTHENIA GRAVIS, ACHR ANTIBODY POSITIVE: Primary | ICD-10-CM

## 2021-05-25 ENCOUNTER — DOCUMENTATION ONLY (OUTPATIENT)
Dept: NEUROLOGY | Facility: CLINIC | Age: 66
End: 2021-05-25

## 2021-06-01 ENCOUNTER — TELEPHONE (OUTPATIENT)
Dept: NEUROLOGY | Facility: CLINIC | Age: 66
End: 2021-06-01

## 2021-06-14 ENCOUNTER — TELEPHONE (OUTPATIENT)
Dept: NEUROLOGY | Facility: CLINIC | Age: 66
End: 2021-06-14

## 2021-06-18 ENCOUNTER — TELEPHONE (OUTPATIENT)
Dept: NEUROLOGY | Facility: CLINIC | Age: 66
End: 2021-06-18

## 2021-07-06 ENCOUNTER — TELEPHONE (OUTPATIENT)
Dept: NEUROLOGY | Facility: CLINIC | Age: 66
End: 2021-07-06

## 2021-08-25 ENCOUNTER — TELEPHONE (OUTPATIENT)
Dept: NEUROLOGY | Facility: CLINIC | Age: 66
End: 2021-08-25

## 2021-09-25 DIAGNOSIS — G70.00 MYASTHENIA GRAVIS, ACHR ANTIBODY POSITIVE: ICD-10-CM

## 2021-09-25 RX ORDER — PYRIDOSTIGMINE BROMIDE 60 MG/1
60 TABLET ORAL EVERY 4 HOURS PRN
Qty: 120 TABLET | Refills: 11 | Status: SHIPPED | OUTPATIENT
Start: 2021-09-25 | End: 2022-11-03 | Stop reason: SDUPTHER

## 2021-10-26 ENCOUNTER — TELEPHONE (OUTPATIENT)
Dept: NEUROLOGY | Facility: CLINIC | Age: 66
End: 2021-10-26
Payer: MEDICARE

## 2021-12-13 ENCOUNTER — OFFICE VISIT (OUTPATIENT)
Dept: NEUROLOGY | Facility: CLINIC | Age: 66
End: 2021-12-13
Payer: MEDICARE

## 2021-12-13 VITALS
WEIGHT: 151.44 LBS | HEIGHT: 70 IN | SYSTOLIC BLOOD PRESSURE: 132 MMHG | DIASTOLIC BLOOD PRESSURE: 77 MMHG | HEART RATE: 85 BPM | BODY MASS INDEX: 21.68 KG/M2

## 2021-12-13 DIAGNOSIS — Z90.89 HISTORY OF THYMECTOMY: ICD-10-CM

## 2021-12-13 DIAGNOSIS — G70.00 MYASTHENIA GRAVIS, ACHR ANTIBODY POSITIVE: Primary | ICD-10-CM

## 2021-12-13 DIAGNOSIS — Z79.52 ON PREDNISONE THERAPY: ICD-10-CM

## 2021-12-13 DIAGNOSIS — E78.49 OTHER HYPERLIPIDEMIA: Chronic | ICD-10-CM

## 2021-12-13 DIAGNOSIS — I10 ESSENTIAL HYPERTENSION: Chronic | ICD-10-CM

## 2021-12-13 PROCEDURE — 99214 OFFICE O/P EST MOD 30 MIN: CPT | Mod: PBBFAC | Performed by: PSYCHIATRY & NEUROLOGY

## 2021-12-13 PROCEDURE — 99999 PR PBB SHADOW E&M-EST. PATIENT-LVL IV: CPT | Mod: PBBFAC,,, | Performed by: PSYCHIATRY & NEUROLOGY

## 2021-12-13 PROCEDURE — 99215 OFFICE O/P EST HI 40 MIN: CPT | Mod: S$PBB,,, | Performed by: PSYCHIATRY & NEUROLOGY

## 2021-12-13 PROCEDURE — 99999 PR PBB SHADOW E&M-EST. PATIENT-LVL IV: ICD-10-PCS | Mod: PBBFAC,,, | Performed by: PSYCHIATRY & NEUROLOGY

## 2021-12-13 PROCEDURE — 99215 PR OFFICE/OUTPT VISIT, EST, LEVL V, 40-54 MIN: ICD-10-PCS | Mod: S$PBB,,, | Performed by: PSYCHIATRY & NEUROLOGY

## 2021-12-13 RX ORDER — PREDNISONE 2.5 MG/1
7.5 TABLET ORAL EVERY OTHER DAY
Qty: 90 TABLET | Refills: 3 | Status: SHIPPED | OUTPATIENT
Start: 2021-12-13 | End: 2022-02-14 | Stop reason: DRUGHIGH

## 2022-01-19 ENCOUNTER — DOCUMENTATION ONLY (OUTPATIENT)
Dept: NEUROLOGY | Facility: CLINIC | Age: 67
End: 2022-01-19
Payer: MEDICARE

## 2022-02-14 ENCOUNTER — OFFICE VISIT (OUTPATIENT)
Dept: NEUROLOGY | Facility: CLINIC | Age: 67
End: 2022-02-14
Payer: MEDICARE

## 2022-02-14 VITALS
HEART RATE: 87 BPM | WEIGHT: 151 LBS | BODY MASS INDEX: 21.62 KG/M2 | DIASTOLIC BLOOD PRESSURE: 71 MMHG | SYSTOLIC BLOOD PRESSURE: 118 MMHG | HEIGHT: 70 IN

## 2022-02-14 DIAGNOSIS — G70.00 MYASTHENIA GRAVIS, ACHR ANTIBODY POSITIVE: Primary | ICD-10-CM

## 2022-02-14 DIAGNOSIS — Z90.89 HISTORY OF THYMECTOMY: ICD-10-CM

## 2022-02-14 DIAGNOSIS — E11.00 TYPE 2 DIABETES MELLITUS WITH HYPEROSMOLARITY WITHOUT COMA, UNSPECIFIED WHETHER LONG TERM INSULIN USE: ICD-10-CM

## 2022-02-14 DIAGNOSIS — E78.49 OTHER HYPERLIPIDEMIA: Chronic | ICD-10-CM

## 2022-02-14 DIAGNOSIS — Z79.52 CURRENT CHRONIC USE OF SYSTEMIC STEROIDS: Chronic | ICD-10-CM

## 2022-02-14 DIAGNOSIS — I10 ESSENTIAL HYPERTENSION: Chronic | ICD-10-CM

## 2022-02-14 PROCEDURE — 99215 PR OFFICE/OUTPT VISIT, EST, LEVL V, 40-54 MIN: ICD-10-PCS | Mod: S$PBB,,, | Performed by: PSYCHIATRY & NEUROLOGY

## 2022-02-14 PROCEDURE — 99215 OFFICE O/P EST HI 40 MIN: CPT | Mod: S$PBB,,, | Performed by: PSYCHIATRY & NEUROLOGY

## 2022-02-14 PROCEDURE — 99999 PR PBB SHADOW E&M-EST. PATIENT-LVL II: ICD-10-PCS | Mod: PBBFAC,,, | Performed by: PSYCHIATRY & NEUROLOGY

## 2022-02-14 PROCEDURE — 99999 PR PBB SHADOW E&M-EST. PATIENT-LVL II: CPT | Mod: PBBFAC,,, | Performed by: PSYCHIATRY & NEUROLOGY

## 2022-02-14 PROCEDURE — 99212 OFFICE O/P EST SF 10 MIN: CPT | Mod: PBBFAC | Performed by: PSYCHIATRY & NEUROLOGY

## 2022-02-14 NOTE — ASSESSMENT & PLAN NOTE
On appropriate medications and managed by PCP. We discussed the importance of managing all secondary stroke risk factors, including DM2. Management is made more difficult with chronic steroids use. We are trying to taper the Prednisone as low as she can tolerate.

## 2022-02-14 NOTE — ASSESSMENT & PLAN NOTE
Now on maintenance dosing of eculizumab and tolerating well. She has an improvement in strength compared to when on IVIg, which worked well initially but she had relapses in MG-related weaknesses about 2-3 weeks following infusion. Normal exam today. No extremity or bulbar weaknesses reported or on exam. She does complain of occasional feelings of generalized weakness especially on the left side, which may be related to her MG though she also reports that this is improved with vitamin therapy prescribed by her PCP and with exercise, which is inconsistent with the disease.               - Continue eculizumab per protocol (1200 mg IV Q2 weeks);              - Reduced Prednisone to alternating days of 10 mg and 5 mg x 2 months. If there is relapse, revert to 10 mg daily and call me immediately / go to ER with respiratory or swallowing crisis. If well-tolerated, message me in 2 months / RTC in 2 months and we will decrease to 10 / 0 daily at that time. This was discussed at length in clinic with patient taking notes;              - Continue Mestinon 60 mg PO Q3-4 hours PRN MG weakness.      Myasthenia Gravis IMPORTANT INFORMATION:     Elective intubation should be considered if serial measurements of the VC show values less than 20 mL/kg or if the NIF is worse than -30 cm H20 or is progressively worsening after serial evaluation.        Absolute Contraindicated Medications (Category 1) Contraindicated Medications (Category 2) Likely to Worsen MG Cautionary Medications  (Category 3) That May Worsen MG but are Usually Tolerated   · Curare  · Botulinum toxin  · D-penicillamine  · Interferon alpha     · Aminoglycoside (Gentamycin, Kanamycin, Streptomycin, Neomycin, Tobramycin)  · Macrolide (Erythromycin, Azithromycin, Telithromycin, Biaxin, Clarithromycin)  · Fluoroquinolone - (Ciprofloxacin, Moxifloxacin, Levofloxacin, Delafloxacin, Norfloxacin, Prulifloxacin)  · Quinine (Use only for  Malaria)  · Quinidine  · Procainamide  · Magnesium salts, IV magnesium replacement  · Chloroquine  · Hydroxychloroquine  · Immune checkpoint inhibitors: Pembrolizumab (Keytruda), Nivolumab (Opdivo), Atezolizumab (Tecentriq), Avelumab (Bavencio), Durvalumab (Imfinzi), Ipilimumab (Yervoy)    · Atropine   · Corticosteroids  · Desferrioxamine  · Beta blockers  · Statins  · Iodinated radiologic contrast agents  · Lithium  · Benzodiazepines   · Calcium Channel Blockers (verapamil)   · Doxacurium  · Neuromuscular blockades (Succinylcholine, Cisatracurium, Rocuronium, Vecuronium, Atracurium)  · Diclomine

## 2022-02-14 NOTE — PROGRESS NOTES
Subjective:     Chief Complaint:  follow up    Interval History 2/14/2022 - I have reviewed relevant medical records in Epic. Here for routine follow up for AChR Ab+ MG on eculizumab infusions Q2 weeks and taking Prednisone 10/7.5 alternating days, decreased from 10 mg daily in December 2021. She has tolerated the steroids reduction well and without breakthrough weakness. She gets home infusions of the eculizumab and tolerates them without adverse reactions. She has complaints of occasional L UE and L LE pain with some secondary weakness that occurs with exercise and which improves with exercise and movement. She has had her A1c rechecked recently and it's currently at 9.0%, increased from 4.8% at last visit. She reports indulging in sweets over the past few months.    Interval History 12.13.2021 - I have reviewed relevant medical records in Epic. Here for routine follow up for AChR Ab+ MG, now on eculizumab, which was started in late October / early November 2021. She was previously on IVIg 1g/kg Q4 weeks and was having relapses in MG-related weaknesses at about 2-3 weeks after each infusion. She stopped IVIg shortly after starting eculizumab. She is also taking Prednisone 10 mg daily. She reports good results with eculizumab, with less evidence of relapse in between infusions compared with IVIg. She does say that sometimes she just feels tired all over and has a difficult time getting going, though she denies any jorge luis bulbar or extremity weaknesses recently. She has Mestinon 60 mg for PRN use but has been taking TID scheduled instead. No adverse GI side effects. Recent A1c is improved from 6.2% to 4.8%.    Interval History 5.13.2021 - I have reviewed relevant medical records in Epic. Here for routine follow up for AChR-Ab+ MG. She is taking Prednisone 10 mg daily and is getting home infusions of IVIg 2g/kg Q4 weeks. She is having some relapse weakness in between infusions and taking Mestinon to address  "with limited success. She responds well to IVIg for 1-2 weeks after infusion, then seems to develop more daily fatigue, gait strength decline, diplopia, and ptosis. She is not interested in increasing Prednisone due to DM2. She had been on IVIg Q6 weeks and at last visit we increased to Q4. She is interested in starting eculizumab since the Ig seems to remain effective for only 1-2 weeks. She has not had any episode worrisome for NM respiratory failure.       History of Present Illness:  I have reviewed all relevant medical records in Epic.     Louisa Quarles is a 66 y.o. female who presents today for initial visit with me for AChRAb+ myasthenia gravis, previously followed by [facility] and in [facility]. Maintained on IVIg and Prednisone 10 mg daily. She has recently had her IVIg regimen changed to 50g daily x 3 days every 4 weeks per [facility] (previously on same dose Q6 weeks). She has noticed an improvement in her strength overall, though does report some days of feeling "worn down" but cannot elaborate or specify exactly which groups of muscles or which functions are impaired. She has been taking Mestinon occasionally and is unaware that it is meant to be used as PRN for temporary reversal of MG-related weaknesses. She denies any dysphagia or dysarthria or dysphonia. She has occasional diplopia but no ptosis. She has been exercising at home and reports occasional UE and LE weakness. She had been experiencing relapses in weakness toward the end of her 6 week interval in between Ig infusions. With the change to Q4 weeks, she notes a decline in that phenomenon. She tolerates the infusions well, though occasionally gets headaches during and after. These respond well to Tylenol 1g. She reports good hydration the day prior to her IVIg.    She had thymectomy in 2012, no thymoma  MG diagnosis in about 2011  Some osteoporosis on most recent dexa scan, now on vitamin D supplements per PCP    Secondary " stroke risk factors of DM2, HLD, HTN    From Previous Notes:    (Dx 2011, s/p thymectomy + adjuvant RTX), who is presenting to the clinic today for f/u on MG.     Interval Hx (2/10/20):  Continues to complain of generalized muscle weakness. Reports mild worsening of swallowing and intermittent chocking on food. Denies diplopia or ptosis. Currently on Mestinon 30 mg BID, prednisone 10 mg daily and IVIG q6 weeks. She also reports continued medication side effect such as increased secretions and drooling (mestinon) and loss of taste (steroids). She thinks that the effect of IVIG does not last for the whole 6 weeks. She is still not interested in switching to Soliris or seeing neuromuscular specialist.        Interval Hx (11/18/19):  Patient is 20 minutes late to her appointment, however I spent 30 minutes with her. Overall feels ok, has good and bad days, depending on the day she has and/or stress levels.   She denies any double vision, SOB or choking on food or water unless she is eating fast. She feels weaker in the mornings rather than evenings. Continued on IVIG q6 weeks (due on 11/20), and mestinon 30 mg BID (morning and lunch), and prednisone 15 mg daily.  She is also inquiring about safety of rotator cuff surgery from MG standpoint, provided a print of contraindicated medication (including anesthetics) in myasthenic patients.   Patient is also inquiring about Soliris that we discussed last time. I provided her with printed information again. Explained to her that she should receive infusions weekly x4 weeks and on 5th week will switch to biweekly infusion, each taking 35 minutes. Patient is not interested at this time due to difficulty of transportation every week.         Initial encounter (8/29/19):  Ms Quarles is new to me, she was previously followed by Dr. Abrams and then Dr. Montilla. She was last seen in the clinic in 5/14/19. Per review of charts and previous notes; patient was maintained on  prednisone, Mestinon and Imuran in the past. Patient has received PLEX in the past with minimal response (last on Feb 2014). Imuran had to be discontinued in 2015 due to bone marrow suppression. Frequency and dosing of Mestinon and prednisone have been increased throughout the years due to progression of symptoms. Most recently she has been maintained on Prednisone 15 mg QD (tapered from 20 during last visit), Mestinon 30 mg TID; IVIG Q6 weeks (last dose 8/28).     Today she is reporting fluctuations in her symptoms, she has good and bad days (generalized weakness). She thinks IVIG might give her a boost for a couple of weeks and then she gradually gets back to where she was. She can not tell if tapering steroids has effected her symptoms. Today she denies any double vision, droopy eyelids, slurred speech, nasal speech or difficulty breathing. She intermittently chokes on solid food so she takes small bites. She complains of drooling and increased drainage from sinuses. She does not have any difficulty with walking long distances or working with arms. Recently her vision is more blurred and that causes her to have headaches. She has an appointment with her ophthalmologist in October. Patient also c/o losing the taste and dryness of lips.     Review of Systems  Review of Systems   Constitutional: Negative for activity change, fatigue and fever.   HENT: Negative for hearing loss, trouble swallowing and voice change.    Eyes: Negative for pain, redness and visual disturbance.   Respiratory: Negative for choking, chest tightness and shortness of breath.    Cardiovascular: Negative for chest pain.   Gastrointestinal: Negative for abdominal pain, nausea and vomiting.   Endocrine: Negative for cold intolerance.   Genitourinary: Negative for frequency.   Musculoskeletal: Negative for arthralgias, back pain, gait problem, joint swelling, myalgias and neck pain.   Skin: Negative for color change.   Allergic/Immunologic:  "Negative for immunocompromised state.   Neurological: Positive for weakness. Negative for dizziness, seizures, speech difficulty, numbness and headaches.   Hematological: Negative for adenopathy.   Psychiatric/Behavioral: Negative for agitation, behavioral problems, dysphoric mood and suicidal ideas.        Objective:     Vitals:    02/14/22 1306   BP: 118/71   Pulse: 87   Weight: 68.5 kg (151 lb)   Height: 5' 10" (1.778 m)   PainSc: 0-No pain       Neurologic Exam     Mental Status   Oriented to person, place, and time.   Attention: normal.   Speech: speech is normal   Level of consciousness: alert  Knowledge: good.     Cranial Nerves     CN II   Visual fields full to confrontation.     CN III, IV, VI   Pupils are equal, round, and reactive to light.  Extraocular motions are normal.   Diplopia: bilateral and horizontal    CN V   Facial sensation intact.     CN VII   Facial expression full, symmetric.     CN VIII   Hearing: intact    CN IX, X   CN IX normal.     CN XI   CN XI normal.     CN XII   CN XII normal.     Maintains upward gaze 30+ seconds without drop.     Motor Exam   Muscle bulk: normal  Overall muscle tone: normal    Strength   Strength 5/5 throughout. No decrease in deltoid, biceps, triceps, hip flexors strength with repeated resistance.     Sensory Exam   Light touch normal.   Vibration normal.     Gait, Coordination, and Reflexes     Gait  Gait: normal    Tremor   Resting tremor: absent  Intention tremor: absent  Action tremor: absent    Reflexes   Right brachioradialis: 1+  Left brachioradialis: 1+  Right biceps: 1+  Left biceps: 1+  Right triceps: 1+  Left triceps: 1+  Right patellar: 1+  Left patellar: 1+      Physical Exam  Vitals reviewed.   Constitutional:       Appearance: She is well-developed.   HENT:      Head: Normocephalic and atraumatic.   Eyes:      Extraocular Movements: EOM normal.      Pupils: Pupils are equal, round, and reactive to light.   Neck:      Thyroid: No thyromegaly. "   Cardiovascular:      Rate and Rhythm: Normal rate.   Pulmonary:      Effort: Pulmonary effort is normal.   Abdominal:      Palpations: Abdomen is soft.   Musculoskeletal:      Cervical back: Normal range of motion and neck supple.   Lymphadenopathy:      Cervical: No cervical adenopathy.   Skin:     General: Skin is warm and dry.   Neurological:      Mental Status: She is oriented to person, place, and time.      Gait: Gait is intact.      Deep Tendon Reflexes: Strength normal.      Reflex Scores:       Tricep reflexes are 1+ on the right side and 1+ on the left side.       Bicep reflexes are 1+ on the right side and 1+ on the left side.       Brachioradialis reflexes are 1+ on the right side and 1+ on the left side.       Patellar reflexes are 1+ on the right side and 1+ on the left side.  Psychiatric:         Speech: Speech normal.         Behavior: Behavior normal.         Thought Content: Thought content normal.           Lab Results   Component Value Date    WBC 7.11 07/10/2018    HGB 14.4 07/10/2018    HCT 43.4 07/10/2018     07/10/2018    ALT 32 07/10/2018    AST 29 07/10/2018     07/10/2018    K 5.2 (H) 07/10/2018     07/10/2018    CREATININE 1.1 07/10/2018    BUN 19 07/10/2018    CO2 30 (H) 07/10/2018    TSH 1.379 01/09/2014    HGBA1C 6.2 (H) 08/29/2019    UTUQMOJM45 >1150 (H) 09/30/2011         Quantitative Myasthenia Gravis Test        Items Tested None Mild Moderate Severe Score   Grade 0 1 2 3    Diplopia (secs) 60 11-59 1-10 Spontaneous    Ptosis (upward gaze, secs) 60 11-59 1-10 Spontaneous    Facial Muscles Normal lid closure Complete, weak, some resistance Complete, without resistance Incomplete    Swallowing (1/2 cup water) Normal Minimal coughing or throat clearing Severe coughing or choking or nasal regurgitation Cannot swallow (test not attempted)    Speech following counting aloud from 1-50 (onset of dysarthria) None at #50 Dysarthria at # 30-49 Dysarthria at #10-29  Dysarthria at #9    Right arm outstretched (90 degrees, sitting) sec 240  10-89 0-9    Left arm outstretched (90 degrees, sitting, secs) 240  10-89 0-9    FVC ?80% 65-79% 50-64% <49%    Right hand  (Male/Female) kg ?45 / ?35 15-44 / 10-29 5-15 / 5-9 0-4 / 0-4    Left hand  (Male/Female) kg ?45 / ?35 15-44 / 10-29 5-15 / 5-9 0-4 / 0-4    Head lifted (45% supine, secs) 120  1-29 0    Right leg outstretched (45-50%, supine) sec 100 31-99 1-30 0    Left leg outstretched (45-50%, supine) sec 100 31-99 1-30 0    Total     2       Myasthenia Gravis- Quality of Life Score (revised) - MG QoL-15r  Please indicate how true each statement has been (over the past four weeks).   Not at all Somewhat Very Much    0 1 2   1. I am frustrated by my MG    X    2. I have trouble with my eyes because of my MG (e.g. double vision)   X     3. I have trouble eating because of MG   X     4. I have limited my social activity because of my MG    X    5. My MG limits my ability to enjoy hobbies and fun activities   X     6. I have trouble meeting the needs of my family because of my MG   X     7. I have to make plans around my MG    X    8. I am bothered by limitations in performing my work (include work at home) because of my MG  X    9. I have difficulty speaking due to MG   X     10. I have lost some personal independence because of my MG (e.g. driving, shopping, running errands)  X    11. I am depressed about my MG   X     12. I have trouble walking due to MG   X     13. I have trouble getting around public places because of my MG   X     14. I feel overwhelmed by my MG   X     15. I have trouble performing my personal grooming needs due to MG   X      Total MG-QoL15r Score 5             Assessment and Plan:     Problem List Items Addressed This Visit     Current chronic use of systemic steroids (Chronic)    Essential hypertension (Chronic)    Current Assessment & Plan     On appropriate medications and managed by  PCP. We discussed the importance of managing all secondary stroke risk factors, including hypertension.           Other hyperlipidemia (Chronic)    Current Assessment & Plan     On appropriate medications and managed by PCP. We discussed the importance of managing all secondary stroke risk factors, including hyperlipidemia.           Myasthenia gravis, AChR antibody positive - Primary    Overview     Ab+, thymoma positive, s/p resection (2011), generalized MG, diagnosed 2011  Prednisone, mestinon, IVIG  Rescue: Plex, IVIG; both moderately helpful  Prior: Imuran -> marrow suppression         Current Assessment & Plan     Now on maintenance dosing of eculizumab and tolerating well. She has an improvement in strength compared to when on IVIg, which worked well initially but she had relapses in MG-related weaknesses about 2-3 weeks following infusion. Normal exam today. No extremity or bulbar weaknesses reported or on exam. She does complain of occasional feelings of generalized weakness especially on the left side, which may be related to her MG though she also reports that this is improved with vitamin therapy prescribed by her PCP and with exercise, which is inconsistent with the disease.               - Continue eculizumab per protocol (1200 mg IV Q2 weeks);              - Reduced Prednisone to alternating days of 10 mg and 5 mg x 2 months. If there is relapse, revert to 10 mg daily and call me immediately / go to ER with respiratory or swallowing crisis. If well-tolerated, message me in 2 months / RTC in 2 months and we will decrease to 10 / 0 daily at that time. This was discussed at length in clinic with patient taking notes;              - Continue Mestinon 60 mg PO Q3-4 hours PRN MG weakness.      Myasthenia Gravis IMPORTANT INFORMATION:     Elective intubation should be considered if serial measurements of the VC show values less than 20 mL/kg or if the NIF is worse than -30 cm H20 or is progressively  worsening after serial evaluation.        Absolute Contraindicated Medications (Category 1) Contraindicated Medications (Category 2) Likely to Worsen MG Cautionary Medications  (Category 3) That May Worsen MG but are Usually Tolerated ·   Curare  · Botulinum toxin  · D-penicillamine  · Interferon alpha     · Aminoglycoside (Gentamycin, Kanamycin, Streptomycin, Neomycin, Tobramycin)  · Macrolide (Erythromycin, Azithromycin, Telithromycin, Biaxin, Clarithromycin)  · Fluoroquinolone - (Ciprofloxacin, Moxifloxacin, Levofloxacin, Delafloxacin, Norfloxacin, Prulifloxacin)  · Quinine (Use only for Malaria)  · Quinidine  · Procainamide  · Magnesium salts, IV magnesium replacement  · Chloroquine  · Hydroxychloroquine  · Immune checkpoint inhibitors: Pembrolizumab (Keytruda), Nivolumab (Opdivo), Atezolizumab (Tecentriq), Avelumab (Bavencio), Durvalumab (Imfinzi), Ipilimumab (Yervoy)    · Atropine   · Corticosteroids  · Desferrioxamine  · Beta blockers  · Statins  · Iodinated radiologic contrast agents  · Lithium  · Benzodiazepines   · Calcium Channel Blockers (verapamil)   · Doxacurium  · Neuromuscular blockades (Succinylcholine, Cisatracurium, Rocuronium, Vecuronium, Atracurium)  · Diclomine               History of thymectomy    Overview     November 2011  Thymoma+  S/p radiation therapy         DM type 2 (diabetes mellitus, type 2)    Current Assessment & Plan     On appropriate medications and managed by PCP. We discussed the importance of managing all secondary stroke risk factors, including DM2. Management is made more difficult with chronic steroids use. We are trying to taper the Prednisone as low as she can tolerate.               RTC in 4 months. Communicate condition during Prednisone wean via Portal.    Hammad Perez MD  [facility]

## 2022-02-23 DIAGNOSIS — D84.9 IMMUNOSUPPRESSED STATUS: ICD-10-CM

## 2022-04-01 ENCOUNTER — TELEPHONE (OUTPATIENT)
Dept: NEUROLOGY | Facility: CLINIC | Age: 67
End: 2022-04-01
Payer: MEDICARE

## 2022-04-01 DIAGNOSIS — G70.00 MYASTHENIA GRAVIS, ACHR ANTIBODY POSITIVE: Primary | ICD-10-CM

## 2022-04-18 ENCOUNTER — TELEPHONE (OUTPATIENT)
Dept: NEUROLOGY | Facility: CLINIC | Age: 67
End: 2022-04-18
Payer: MEDICARE

## 2022-04-18 NOTE — TELEPHONE ENCOUNTER
----- Message from Dionna Das sent at 4/18/2022  9:29 AM CDT -----  Contact: pt  Pt requesting call back regarding booster shot           Confirmed patient's contact info below:  Contact Name: Louisa Quarles  Phone Number: [phone]

## 2022-04-18 NOTE — TELEPHONE ENCOUNTER
I returned call to patient who stated she will be at her ID appointment on 5.4.22, and asked if she could receive COVID 3rd vaccine during this appointment.  I instructed her to ask the ID provider during appointment to see if this was safe and could be administered at the same time as the Meningococcal vaccines.  I explained the rationale for ID referral regarding her Soliris therapy as they will manage her immunizations and see that she receives them as appropriate.  She asked if further vaccinations could be received in [place] as she lives 3 hours away, and I informed her that her Meningococcal boosters will be due in 3 and 5 years, and after this initial ID appointment we could make arrangements to receive those immunizations local to her home.  She thanked me for the information and had no further questions or concerns.

## 2022-04-21 ENCOUNTER — TELEPHONE (OUTPATIENT)
Dept: INFECTIOUS DISEASES | Facility: CLINIC | Age: 67
End: 2022-04-21
Payer: MEDICARE

## 2022-04-21 NOTE — TELEPHONE ENCOUNTER
----- Message from Jacob Matson MA sent at 4/21/2022  1:42 PM CDT -----  Contact: patient    ----- Message -----  From: Moises Garcia  Sent: 4/21/2022   1:38 PM CDT  To: [facility]    Patient calling in regards to scheduled appointment. Patient stated that her appointment was supposed to be scheduled on 05/05/2022 at 2 pm not 05/06/2022 due to transportation .Requesting call back.      Patient @ [phone] (home)

## 2022-05-10 ENCOUNTER — CLINICAL SUPPORT (OUTPATIENT)
Dept: INFECTIOUS DISEASES | Facility: CLINIC | Age: 67
End: 2022-05-10
Payer: MEDICARE

## 2022-05-10 ENCOUNTER — OFFICE VISIT (OUTPATIENT)
Dept: INFECTIOUS DISEASES | Facility: CLINIC | Age: 67
End: 2022-05-10
Payer: MEDICARE

## 2022-05-10 VITALS
DIASTOLIC BLOOD PRESSURE: 75 MMHG | HEIGHT: 70 IN | TEMPERATURE: 98 F | WEIGHT: 157.63 LBS | BODY MASS INDEX: 22.57 KG/M2 | SYSTOLIC BLOOD PRESSURE: 144 MMHG | HEART RATE: 82 BPM

## 2022-05-10 DIAGNOSIS — G70.00 MYASTHENIA GRAVIS, ACHR ANTIBODY POSITIVE: ICD-10-CM

## 2022-05-10 PROCEDURE — 90734 MENACWYD/MENACWYCRM VACC IM: CPT | Mod: PBBFAC

## 2022-05-10 PROCEDURE — 99204 OFFICE O/P NEW MOD 45 MIN: CPT | Mod: S$PBB,,, | Performed by: PHYSICIAN ASSISTANT

## 2022-05-10 PROCEDURE — 99215 OFFICE O/P EST HI 40 MIN: CPT | Mod: PBBFAC,25 | Performed by: PHYSICIAN ASSISTANT

## 2022-05-10 PROCEDURE — 99999 PR PBB SHADOW E&M-EST. PATIENT-LVL V: ICD-10-PCS | Mod: PBBFAC,,, | Performed by: PHYSICIAN ASSISTANT

## 2022-05-10 PROCEDURE — 90620 MENB-4C VACCINE IM: CPT | Mod: PBBFAC

## 2022-05-10 PROCEDURE — 99999 PR PBB SHADOW E&M-EST. PATIENT-LVL V: CPT | Mod: PBBFAC,,, | Performed by: PHYSICIAN ASSISTANT

## 2022-05-10 PROCEDURE — 99204 PR OFFICE/OUTPT VISIT, NEW, LEVL IV, 45-59 MIN: ICD-10-PCS | Mod: S$PBB,,, | Performed by: PHYSICIAN ASSISTANT

## 2022-05-10 NOTE — PROGRESS NOTES
Patient received 2 vaccines IM to the right deltoid, Bexsero anterior, and Menveo posterior.  Tolerated well and left in NAD

## 2022-05-10 NOTE — PROGRESS NOTES
Pre Biologic Response Modifier Therapy Consult  BMR Recipient Evaluation    Requesting Physician: Dr. Perez     Reason for Visit:    Chief Complaint   Patient presents with    Immunizations     History of Present Illness  Louisa Quarles is a 67 y.o. year old Black or  female with advanced Myasthenia Gravis currently being evaluated for prior to starting biologic response modifer (BRM) therapy.  Patient denies any recent fever, chills, or infective infective illnesses.    Continue eculizumab per protocol (1200 mg IV Q2 weeks); started on 6/2021               - Reduced Prednisone to alternating days of 10 mg and 5 mg x 2 months. If there is relapse, revert to 10 mg daily and call me immediately / go to ER with respiratory or swallowing crisis. If well-tolerated, message me in 2 months / RTC in 2 months and we will decrease to 10 / 0 daily at that time. This was discussed at length in clinic with patient taking notes;              - Continue Mestinon 60 mg PO Q3-4 hours PRN MG weakness.     Infusion last infusion 5/9/22        1) Do you have a history of:    YES NO   Diabetes                 [x]       []   Diabetic Foot Infection/Bone Infection  []  [x]   Surgical Removal of Spleen    []  [x]       2) Have you had recurrent infections involving:             YES NO  Sinus infections  []  [x]  Sore Throat   []  [x]                             Prostate Infections  []  [x]  .                         Bladder Infections  []  [x]                                 Kidney Infections  []  [x]        Intestinal Infections  []  [x]   Skin Infections   []  [x]                      3)Have you ever had: YES     NO UNKNOWN      Chicken Pox   [x]  []  []                 Shingles   []  []  [x]                Orolabial Herpes             []  [x]  []       Genital Herpes  []  [x]  []           Cytomegalovirus  []  [x]  []               Shelly-Barr Virus  []  [x]  []              Genital Warts   []  [x]  []                 Hepatitis A   []  [x]  []             Hepatitis B   []  [x]  []                Hepatitis C   []  [x]  []                 Syphilis   []  [x]  []                Gonorrhea   []  [x]  []                 Pelvic Inflammatory  []  [x]  []   Disease   []  [x]  []                    Chlamydia Infection  []  [x]  []                Intestinal parasites        or worms   []  [x]  []                 Fungal Infections  []  [x]  []                Blood Infections  []  [x]  []     Comment:       4) Have you ever been exposed   YES NO  to someone with tuberculosis?  []  [x]   If yes, what treatment did you receive:     5) What states have you lived in? [place]     6) What countries have you visited for more than 2 weeks?  none                        YES NO  7) Did you have any associated infections?     []  [x]     8) Are you planning to travel outside the           [place] after starting BRMs?    []   [x]           Household                  YES NO  9 Do you have pets living in your house?   []   [x]             If yes, describe:     Do you spend time or live on a farm or                 have livestock or other farm animals?   []  [x]   If yes, which ones:    Do you have a fish tank?     []  [x]                       Do you have a litter box?     []  [x]                        Do you fish or hunt?      []  [x]                       Do you clean or skin fish or animals?   []  [x]                      Do you consume raw or undercooked                meat, fish, or shellfish?     []  [x]                                 12)Do you garden or otherwise  YES NO   work in the soil?    []  [x]   13)Do you hike, camp, or spend   time in wooded areas?   []  [x]                           14) The patient's immunization history was reviewed.   Have you ever received:  YES NO UNKNOWN DATES  Routine Childhood vaccines  [x]  []  []                Influenza vaccine   [x]  []  []         Pneumonia    [x]  []  []      Tetanus-diptheria   []  []  []    Hepatitis A vaccine series       []  [x]  []       Hepatitis B vaccine series       []  [x]  []        Meningitis vaccine   []  [x]  []     Varicella vaccine   []  [x]  []           Immunization History   Administered Date(s) Administered    COVID-19, MRNA, LN-S, PF (MODERNA FULL 0.5 ML DOSE) 02/02/2021, 03/02/2021    Meningococcal B, OMV 05/13/2021, 05/10/2022    Meningococcal Conjugate (MCV4O) 05/10/2022    Meningococcal Conjugate (MCV4P) 05/13/2021             Moderna vaccine booster 9/18/21      Based on the patients immunization history and serologies, immunizations were ordered:         Ordered  Not Ordered  Influenza Vaccine     []    [x]   Hepatitis A series at 0, 6 months   []    [x]   Hepatitis B sries at 0, 1, and 6 months  []    [x]   Hepatitis B High Dose series 0,1, and 6 months []    [x]   Prevnar      []    [x]   Pneumovax      []    [x]   TDap       []    [x]   Zoster       []    [x]   Menactra      [x]    []   HiB       []    [x]               The patient was encouraged to contact us about any problems that may develop after immunization and possible side effects were reviewed.       Allergies: Patient has no known allergies.  Immunization History   Administered Date(s) Administered    COVID-19, MRNA, LN-S, PF (MODERNA FULL 0.5 ML DOSE) 02/02/2021, 03/02/2021    Meningococcal B, OMV 05/13/2021, 05/10/2022    Meningococcal Conjugate (MCV4O) 05/10/2022    Meningococcal Conjugate (MCV4P) 05/13/2021     Past Medical History:   Diagnosis Date    Cancer     Diabetes mellitus     Essential hypertension 9/1/2020    Headache(784.0)     HEARING LOSS     History of thymectomy 7/16/2012    Memory loss     Myasthenia exacerbation     Myasthenia gravis with acute exacerbation     Myasthenia gravis with thymoma     Thymoma     Vision abnormalities     Stargadtch's disease/Macular degeneration -causes vision loss bilaterall, right worse than left      Past Surgical History:   Procedure  Laterality Date    COLONOSCOPY      thymus removal        Social History     Socioeconomic History    Marital status: Single   Tobacco Use    Smoking status: Former Smoker     Packs/day: 0.50     Years: 10.00     Pack years: 5.00     Types: Cigarettes     Quit date: 8/1/2011     Years since quitting: 10.7   Substance and Sexual Activity    Alcohol use: No    Drug use: No       Review of Systems   Constitutional: Negative for chills, decreased appetite, fever, malaise/fatigue, night sweats, weight gain and weight loss.   HENT: Negative for congestion, ear pain, hearing loss, hoarse voice, sore throat and tinnitus.    Eyes: Negative for blurred vision, pain, vision loss in left eye, vision loss in right eye and visual disturbance.   Cardiovascular: Negative for chest pain, dyspnea on exertion, leg swelling and palpitations.   Respiratory: Negative for cough, shortness of breath, sputum production and wheezing.    Skin: Negative for dry skin, itching, rash and suspicious lesions.   Musculoskeletal: Negative for back pain, joint pain, myalgias and neck pain.   Gastrointestinal: Negative for abdominal pain, constipation, diarrhea, heartburn, nausea and vomiting.   Genitourinary: Negative for dysuria, flank pain, frequency, hematuria, hesitancy and urgency.   Neurological: Negative for dizziness, headaches, numbness, paresthesias and weakness.   Psychiatric/Behavioral: Negative for depression and memory loss. The patient does not have insomnia and is not nervous/anxious.      Physical Exam  Vitals and nursing note reviewed.   Constitutional:       General: She is not in acute distress.     Appearance: She is well-developed. She is not diaphoretic.   HENT:      Head: Normocephalic and atraumatic.   Eyes:      Pupils: Pupils are equal, round, and reactive to light.   Cardiovascular:      Rate and Rhythm: Normal rate and regular rhythm.      Heart sounds: Normal heart sounds. No murmur heard.    No friction rub. No  gallop.   Pulmonary:      Effort: Pulmonary effort is normal. No respiratory distress.      Breath sounds: Normal breath sounds. No wheezing or rales.   Chest:      Chest wall: No tenderness.   Abdominal:      General: Bowel sounds are normal. There is no distension.      Palpations: Abdomen is soft. There is no mass.      Tenderness: There is no abdominal tenderness. There is no guarding or rebound.      Hernia: No hernia is present.   Musculoskeletal:         General: No tenderness or deformity. Normal range of motion.      Cervical back: Normal range of motion and neck supple.   Skin:     General: Skin is warm and dry.      Coloration: Skin is not pale.      Findings: No erythema.   Neurological:      Mental Status: She is alert and oriented to person, place, and time.      Cranial Nerves: No cranial nerve deficit.      Coordination: Coordination normal.   Psychiatric:         Behavior: Behavior normal.         Thought Content: Thought content normal.       Diagnostics: No results found for: RPR  No results found for: CMVANTIBODIE  No results found for: HIV1X2  No results found for: HTLVIIIANTIB  No results found for: HEPBSAG  No results found for: HEPBCAB  No results found for: HEPCAB  No results found for: TOXOIGG  No components found for: TOXOIGGINTER  No results found for: ZEB6RBS  No results found for: EVZ0EDF  No results found for: VARICELLAZOS  No results found for: VARICELLAINT  No results found for: STRONGANTIGG  No results found for: EPSTEINBARRV  No results found for: HEPBSAB  No results found for: QUANTIFERON  No results found for: HEPAIGM  No results found for: PPD  No results found for this or any previous visit.          BRM - Candidacy    Biologic Response Modifier Candidacy: Based on available information, there are no identified significant barriers to BRMs from an infectious disease standpoint pending acceptable serologies.  Final determination of BRM candidacy will be made once evaluation is  complete and reviewed.    Terminal Complement Inhibitor Protocol (eculizumab, ravulizumab):    Nasopharyngeal Eradication of Meningococcus  - CTX 2g one time dose     Prophylaxis (start day following ceftriaxone or cipro eradication dose):   - Penicillin 250 mg PO twice daily   - If PCN allergy: Azithromycin 250mg daily  - Continued long-term antibacterial prophylaxis is recommended until 6 months after completion of eculizumab    Vaccination Recommendations:   Patients must receive meningococcal vaccines at least 2 weeks prior to treatment, unless treatment cannot be safely delayed    1. Influenza Vaccine: Annually/each influenza season  2. Tetanus Vaccine: booster every 10 years   3. Meningococcal ACWY Vaccine (Menactra, Meveo):  Completed at visit  4. Meningococcal Group B Vaccine  (Bexsero): completed at visit  5. Haemophilus Influenzae Type B (Hib) Conjugate Vaccine: 1 dose regardless of history, no booster  6. Streptococcus Pneumoniae (Pneumococcal) Vaccines: [id number] x 1 dose.       Ramiro Griffin PA-C        Counseling:I discussed with Louisa the risk for increased susceptibility to infections following BRM therapy including increased risk for infection.  The patients has been counseled on the importance of vaccinations including but not limited to a yearly flu vaccine.Specific guidance has been provided to the patient regarding the patients occupation, hobbies and activities to avoid future infectious complications including but not limited to avoiding undercooked meats and seafood, proper hygiene, and contact with animals.

## 2022-05-11 ENCOUNTER — TELEPHONE (OUTPATIENT)
Dept: NEUROLOGY | Facility: CLINIC | Age: 67
End: 2022-05-11
Payer: MEDICARE

## 2022-05-11 NOTE — TELEPHONE ENCOUNTER
----- Message from Roshan Gautam MA sent at 5/11/2022  2:00 PM CDT -----  Contact: @[phone]    ----- Message -----  From: Quinten Medina  Sent: 5/11/2022   1:56 PM CDT  To: Chris Guan Staff    Pt requesting a call back from Jairon pt states in regards to a missed call.

## 2022-05-11 NOTE — TELEPHONE ENCOUNTER
Returned call to patient.  She stated she had her appointment with Infectious Diseases, and she would like to know if she needs to take the antibiotics that were prescribed by ID physician.  Dr. Phillips's routed this message for review and follow up.  Patient stated ID recommended Pneumonia vaccine, but she has already received this at Dr. Anderson's office.  I will fax Dr. Anderson a copy of the ID visit note and also inquire if they can fax over patient's immunization record.  I told patient I will call her once I hear back from Dr. Perez regarding antibiotic therapy.

## 2022-05-11 NOTE — TELEPHONE ENCOUNTER
----- Message from Roshan Gautam MA sent at 5/11/2022 10:11 AM CDT -----  Regarding: FW: pt called    ----- Message -----  From: Honey Cloud  Sent: 5/11/2022  10:00 AM CDT  To: Chris Guan Staff  Subject: pt called                                        Name of Who is Calling: ОЛЬГА CARTER [[id number]]      What is the request in detail: pt is requesting to speak with a nurse about a vaccine she took on yesterday. Please advise       Can the clinic reply by [facility]: No      What [facility] Back [facility]: [phone]

## 2022-05-11 NOTE — TELEPHONE ENCOUNTER
"5/11/2022 3:31:38 PM Transmission Record   Sent to [phone] with remote ID "   Result: (0/301;0/0) Normal Busy   Page record: NONE SENT   Elapsed time: 00:05 on channel 21    5/11/2022 3:36:48 PM Transmission Record   Sent to [phone] with remote ID "[email or web address]"   Result: (0/339;0/0) Success   Page record: 1 - 17   Elapsed time: 04:34 on channel 19      "

## 2022-05-12 NOTE — TELEPHONE ENCOUNTER
I called patient back informing her that Dr. Perez is deferring to ID physician's recommendations to take the antibiotics.  Patient verbalized understanding and states she will get the prescriptions fill and take as directed.  I also informed her that I faxed over her ID visit note to Dr. Anderson as she asked, and I asked Dr. Anderson's office to fax over their immunization records for patient so we can update our records.  Patient thanked RN and had no further questions or concerns at this time.

## 2022-05-13 ENCOUNTER — TELEPHONE (OUTPATIENT)
Dept: INFECTIOUS DISEASES | Facility: CLINIC | Age: 67
End: 2022-05-13
Payer: MEDICARE

## 2022-05-13 NOTE — TELEPHONE ENCOUNTER
----- Message from Fide Montez sent at 5/13/2022  3:27 PM CDT -----  Regarding: pt  Pt is calling to speak with the nurse pt was seen on 5/10 pt said her prescription wasn't sent to the pharmacy can you please call pt at [phone]. Pt said she will further discuss when the nurse calls her back pt said she needs an antibiotic called in     [place]

## 2022-05-16 ENCOUNTER — TELEPHONE (OUTPATIENT)
Dept: INFECTIOUS DISEASES | Facility: CLINIC | Age: 67
End: 2022-05-16
Payer: MEDICARE

## 2022-05-16 NOTE — TELEPHONE ENCOUNTER
----- Message from Cornelia Hood sent at 5/16/2022  3:38 PM CDT -----  Pharm Calling to see what was suppose to be sent  Pt doesn't know the name if medication  Advised I dont see where anythng was sent  please reach out to pt to discuss  [phone] (home)

## 2022-05-16 NOTE — TELEPHONE ENCOUNTER
----- Message from Cornelia Hood sent at 5/16/2022  3:38 PM CDT -----  Pharm Calling to see what was suppose to be sent  Pt doesn't know the name if medication  Advised I dont see where anythng was sent  please reach out to pt to discuss  [phone] (home)

## 2022-05-17 ENCOUNTER — TELEPHONE (OUTPATIENT)
Dept: NEUROLOGY | Facility: CLINIC | Age: 67
End: 2022-05-17
Payer: MEDICARE

## 2022-05-17 DIAGNOSIS — Z79.899 LONG TERM CURRENT USE OF IMMUNOSUPPRESSIVE DRUG: ICD-10-CM

## 2022-05-17 PROBLEM — Z79.60 LONG TERM CURRENT USE OF IMMUNOSUPPRESSIVE DRUG: Status: ACTIVE | Noted: 2022-05-17

## 2022-05-17 NOTE — TELEPHONE ENCOUNTER
----- Message from Roshan Gautam MA sent at 5/16/2022  4:56 PM CDT -----  Regarding: FW: Medication  Contact: pt @ [phone]    ----- Message -----  From: Fariba Mcgrath  Sent: 5/16/2022   3:55 PM CDT  To: Chris Guan Staff  Subject: Medication                                       Pt went to ID Dept, and it was recommended to get antibiotic shot. Asking for a call back because pt is at pharmacy now.

## 2022-05-17 NOTE — TELEPHONE ENCOUNTER
Returned call to patient.  She states she went to get the antibiotics at the pharmacy that the ID physician recommended, but the pharmacist informed her that the prescriptions were actually for vaccinations.  Patient wants to know if she should be taking these, as Dr. Perez deferred to ID regarding antibiotic prophylaxis, and wants to know if this was just a recommendations, or if the ID physician actually ordered these antibiotics.  Message sent to Dr. Griffin, ID physician requesting clarification.

## 2022-08-29 ENCOUNTER — TELEPHONE (OUTPATIENT)
Dept: NEUROLOGY | Facility: CLINIC | Age: 67
End: 2022-08-29
Payer: MEDICARE

## 2022-08-29 NOTE — TELEPHONE ENCOUNTER
----- Message from Roshan Gautam MA sent at 8/29/2022  3:15 PM CDT -----  Regarding: FW: Return Call Needed  Contact: @[phone]    ----- Message -----  From: Quinten Medina  Sent: 8/29/2022   9:52 AM CDT  To: Chris Guan Staff  Subject: Return Call Needed                               Pt requesting a call back from the nurse regarding scheduling her appt via video call.  Please call to discuss further.

## 2022-09-26 DIAGNOSIS — G70.00 MYASTHENIA GRAVIS, ACHR ANTIBODY POSITIVE: Primary | ICD-10-CM

## 2022-09-26 RX ORDER — PREDNISONE 10 MG/1
TABLET ORAL
Qty: 30 TABLET | Refills: 2 | Status: SHIPPED | OUTPATIENT
Start: 2022-09-26 | End: 2022-11-03 | Stop reason: SDUPTHER

## 2022-11-03 ENCOUNTER — OFFICE VISIT (OUTPATIENT)
Dept: NEUROLOGY | Facility: CLINIC | Age: 67
End: 2022-11-03
Payer: MEDICARE

## 2022-11-03 VITALS
DIASTOLIC BLOOD PRESSURE: 83 MMHG | HEART RATE: 81 BPM | WEIGHT: 156.5 LBS | BODY MASS INDEX: 22.46 KG/M2 | SYSTOLIC BLOOD PRESSURE: 141 MMHG

## 2022-11-03 DIAGNOSIS — Z79.52 CURRENT CHRONIC USE OF SYSTEMIC STEROIDS: Chronic | ICD-10-CM

## 2022-11-03 DIAGNOSIS — E11.00 TYPE 2 DIABETES MELLITUS WITH HYPEROSMOLARITY WITHOUT COMA, WITHOUT LONG-TERM CURRENT USE OF INSULIN: ICD-10-CM

## 2022-11-03 DIAGNOSIS — Z90.89 HISTORY OF THYMECTOMY: ICD-10-CM

## 2022-11-03 DIAGNOSIS — I10 ESSENTIAL HYPERTENSION: Chronic | ICD-10-CM

## 2022-11-03 DIAGNOSIS — Z79.52 ON PREDNISONE THERAPY: ICD-10-CM

## 2022-11-03 DIAGNOSIS — G70.00 MYASTHENIA GRAVIS, ACHR ANTIBODY POSITIVE: Primary | ICD-10-CM

## 2022-11-03 DIAGNOSIS — E78.49 OTHER HYPERLIPIDEMIA: Chronic | ICD-10-CM

## 2022-11-03 PROCEDURE — 99214 OFFICE O/P EST MOD 30 MIN: CPT | Mod: PBBFAC | Performed by: PSYCHIATRY & NEUROLOGY

## 2022-11-03 PROCEDURE — 99999 PR PBB SHADOW E&M-EST. PATIENT-LVL IV: CPT | Mod: PBBFAC,,, | Performed by: PSYCHIATRY & NEUROLOGY

## 2022-11-03 PROCEDURE — 99215 PR OFFICE/OUTPT VISIT, EST, LEVL V, 40-54 MIN: ICD-10-PCS | Mod: S$PBB,,, | Performed by: PSYCHIATRY & NEUROLOGY

## 2022-11-03 PROCEDURE — 99215 OFFICE O/P EST HI 40 MIN: CPT | Mod: S$PBB,,, | Performed by: PSYCHIATRY & NEUROLOGY

## 2022-11-03 PROCEDURE — 99999 PR PBB SHADOW E&M-EST. PATIENT-LVL IV: ICD-10-PCS | Mod: PBBFAC,,, | Performed by: PSYCHIATRY & NEUROLOGY

## 2022-11-03 RX ORDER — PYRIDOSTIGMINE BROMIDE 60 MG/1
60 TABLET ORAL EVERY 4 HOURS PRN
Qty: 120 TABLET | Refills: 11 | Status: SHIPPED | OUTPATIENT
Start: 2022-11-03 | End: 2023-12-12

## 2022-11-03 RX ORDER — PREDNISONE 2.5 MG/1
2.5 TABLET ORAL EVERY OTHER DAY
Qty: 45 TABLET | Refills: 3 | Status: SHIPPED | OUTPATIENT
Start: 2022-11-03 | End: 2023-02-02 | Stop reason: SDUPTHER

## 2022-11-03 RX ORDER — PREDNISONE 5 MG/1
TABLET ORAL
Qty: 180 TABLET | Refills: 3 | Status: SHIPPED | OUTPATIENT
Start: 2022-11-03 | End: 2023-02-02 | Stop reason: SDUPTHER

## 2022-11-03 NOTE — PROGRESS NOTES
Subjective:     Chief Complaint:  Neurologic Problem (ACh-R Ab+ myasthenia gravis)    Interval History 11/3/2022 - I have reviewed relevant medical records in [facility]. Here for a routine follow up for antibody positive MG on Soliris per protocol as well as Prednisone alternating 10 mg and 5 mg. She reports doing really well and is without focal or fatigable weaknesses, no diplopia or ptosis, no dysphagia, dysphonia, dysarthria, YU, orthopnea, or gait changes. She does report that she is strong and can easily do whatever she wants to do physically but feels like she is not as strong overall like she was preciously. When asked directly what it is that she cannot do now that she used to could do, she says that she is able to do everything.    After last visit she weaned Prednisone from 10 daily to 10/5 alternating days and noted no relapses in strength or function. She rarely uses Mestinon.    She reports tolerating eculizumab infusions just fine. She sometimes has a mild headache afterwards but is not bothered too much by them. She gets home infusions. She is up to date in her vaccinations for meningococcal ppx.    Interval History 2/14/2022 - I have reviewed relevant medical records in [facility]. Here for routine follow up for AChR Ab+ MG on eculizumab infusions Q2 weeks and taking Prednisone 10/7.5 alternating days, decreased from 10 mg daily in December 2021. She has tolerated the steroids reduction well and without breakthrough weakness. She gets home infusions of the eculizumab and tolerates them without adverse reactions. She has complaints of occasional L UE and L LE pain with some secondary weakness that occurs with exercise and which improves with exercise and movement. She has had her A1c rechecked recently and it's currently at 9.0%, increased from 4.8% at last visit. She reports indulging in sweets over the past few months.    Interval History 12.13.2021 - I have reviewed relevant medical records in  [facility]. Here for routine follow up for AChR Ab+ MG, now on eculizumab, which was started in late October / early November 2021. She was previously on IVIg 1g/kg Q4 weeks and was having relapses in MG-related weaknesses at about 2-3 weeks after each infusion. She stopped IVIg shortly after starting eculizumab. She is also taking Prednisone 10 mg daily. She reports good results with eculizumab, with less evidence of relapse in between infusions compared with IVIg. She does say that sometimes she just feels tired all over and has a difficult time getting going, though she denies any jorge luis bulbar or extremity weaknesses recently. She has Mestinon 60 mg for PRN use but has been taking TID scheduled instead. No adverse GI side effects. Recent A1c is improved from 6.2% to 4.8%.    Interval History 5.13.2021 - I have reviewed relevant medical records in [facility]. Here for routine follow up for AChR-Ab+ MG. She is taking Prednisone 10 mg daily and is getting home infusions of IVIg 2g/kg Q4 weeks. She is having some relapse weakness in between infusions and taking Mestinon to address with limited success. She responds well to IVIg for 1-2 weeks after infusion, then seems to develop more daily fatigue, gait strength decline, diplopia, and ptosis. She is not interested in increasing Prednisone due to DM2. She had been on IVIg Q6 weeks and at last visit we increased to Q4. She is interested in starting eculizumab since the Ig seems to remain effective for only 1-2 weeks. She has not had any episode worrisome for NM respiratory failure.       History of Present Illness:  I have reviewed all relevant medical records in Epic.     Louisa Quarles is a 67 y.o. female who presents today for initial visit with me for AChRAb+ myasthenia gravis, previously followed by [facility] and in [facility]. Maintained on IVIg and Prednisone 10 mg daily. She has recently had her IVIg regimen changed to 50g daily x 3 days every 4 weeks per [facility] (previously on same dose Q6 weeks). She has noticed an improvement in her strength overall, though does report some days of feeling "worn down" but cannot elaborate or specify exactly which groups of muscles or which functions are impaired. She has been taking Mestinon occasionally and is unaware that it is meant to be used as PRN for temporary reversal of MG-related weaknesses. She denies any dysphagia or dysarthria or dysphonia. She has occasional diplopia but no ptosis. She has been exercising at home and reports occasional UE and LE weakness. She had been experiencing relapses in weakness toward the end of her 6 week interval in between Ig infusions. With the change to Q4 weeks, she notes a decline in that phenomenon. She tolerates the infusions well, though occasionally gets headaches during and after. These respond well to Tylenol 1g. She reports good hydration the day prior to her IVIg.    She had thymectomy in 2012, no thymoma  MG diagnosis in about 2011  Some osteoporosis on most recent dexa scan, now on vitamin D supplements per PCP    Secondary stroke risk factors of DM2, HLD, HTN    From Previous Notes:    (Dx 2011, s/p thymectomy + adjuvant RTX), who is presenting to the clinic today for f/u on MG.     Interval Hx (2/10/20):  Continues to complain of generalized muscle weakness. Reports mild worsening of swallowing and intermittent chocking on food. Denies diplopia or ptosis. Currently on Mestinon 30 mg BID, prednisone 10 mg daily and IVIG q6 weeks. She also reports continued medication side effect such as increased secretions and drooling (mestinon) and loss of taste (steroids). She thinks that the effect of IVIG does not last for the whole 6 weeks. She is still not interested in switching to Soliris or seeing neuromuscular specialist.        Interval Hx (11/18/19):  Patient is 20 minutes late to her appointment, however I spent 30 minutes with her. Overall feels ok, has good and bad " days, depending on the day she has and/or stress levels.   She denies any double vision, SOB or choking on food or water unless she is eating fast. She feels weaker in the mornings rather than evenings. Continued on IVIG q6 weeks (due on 11/20), and mestinon 30 mg BID (morning and lunch), and prednisone 15 mg daily.  She is also inquiring about safety of rotator cuff surgery from MG standpoint, provided a print of contraindicated medication (including anesthetics) in myasthenic patients.   Patient is also inquiring about Soliris that we discussed last time. I provided her with printed information again. Explained to her that she should receive infusions weekly x4 weeks and on 5th week will switch to biweekly infusion, each taking 35 minutes. Patient is not interested at this time due to difficulty of transportation every week.         Initial encounter (8/29/19):  Ms Quarles is new to me, she was previously followed by Dr. Abrams and then Dr. Montilla. She was last seen in the clinic in 5/14/19. Per review of charts and previous notes; patient was maintained on prednisone, Mestinon and Imuran in the past. Patient has received PLEX in the past with minimal response (last on Feb 2014). Imuran had to be discontinued in 2015 due to bone marrow suppression. Frequency and dosing of Mestinon and prednisone have been increased throughout the years due to progression of symptoms. Most recently she has been maintained on Prednisone 15 mg QD (tapered from 20 during last visit), Mestinon 30 mg TID; IVIG Q6 weeks (last dose 8/28).     Today she is reporting fluctuations in her symptoms, she has good and bad days (generalized weakness). She thinks IVIG might give her a boost for a couple of weeks and then she gradually gets back to where she was. She can not tell if tapering steroids has effected her symptoms. Today she denies any double vision, droopy eyelids, slurred speech, nasal speech or difficulty breathing. She  intermittently chokes on solid food so she takes small bites. She complains of drooling and increased drainage from sinuses. She does not have any difficulty with walking long distances or working with arms. Recently her vision is more blurred and that causes her to have headaches. She has an appointment with her ophthalmologist in October. Patient also c/o losing the taste and dryness of lips.     Review of Systems  Review of Systems   Constitutional:  Negative for activity change, fatigue and fever.   HENT:  Negative for hearing loss, trouble swallowing and voice change.    Eyes:  Negative for pain, redness and visual disturbance.   Respiratory:  Negative for choking, chest tightness and shortness of breath.    Cardiovascular:  Negative for chest pain.   Gastrointestinal:  Negative for abdominal pain, nausea and vomiting.   Endocrine: Negative for cold intolerance.   Genitourinary:  Negative for frequency.   Musculoskeletal:  Negative for arthralgias, back pain, gait problem, joint swelling, myalgias and neck pain.   Skin:  Negative for color change.   Allergic/Immunologic: Negative for immunocompromised state.   Neurological:  Positive for weakness. Negative for dizziness, seizures, speech difficulty, numbness and headaches.   Hematological:  Negative for adenopathy.   Psychiatric/Behavioral:  Negative for agitation, behavioral problems, dysphoric mood and suicidal ideas.       Objective:     Vitals:    11/03/22 1326   BP: (!) 141/83   Pulse: 81   Weight: 71 kg (156 lb 8.4 oz)   PainSc: 0-No pain       Neurologic Exam     Mental Status   Oriented to person, place, and time.   Attention: normal.   Speech: speech is normal   Level of consciousness: alert  Knowledge: good.     Cranial Nerves     CN II   Visual fields full to confrontation.     CN III, IV, VI   Pupils are equal, round, and reactive to light.  Extraocular motions are normal.   Diplopia: bilateral and horizontal    CN V   Facial sensation intact.      CN VII   Facial expression full, symmetric.     CN VIII   Hearing: intact    CN IX, X   CN IX normal.     CN XI   CN XI normal.     CN XII   CN XII normal.     Maintains upward gaze 30+ seconds without drop.     Motor Exam   Muscle bulk: normal  Overall muscle tone: normal    Strength   Strength 5/5 throughout. No decrease in deltoid, biceps, triceps, hip flexors strength with repeated resistance.     Sensory Exam   Light touch normal.   Vibration normal.     Gait, Coordination, and Reflexes     Gait  Gait: normal    Tremor   Resting tremor: absent  Intention tremor: absent  Action tremor: absent    Reflexes   Right brachioradialis: 1+  Left brachioradialis: 1+  Right biceps: 1+  Left biceps: 1+  Right triceps: 1+  Left triceps: 1+  Right patellar: 1+  Left patellar: 1+    Physical Exam  Vitals reviewed.   Constitutional:       Appearance: She is well-developed.   HENT:      Head: Normocephalic and atraumatic.   Eyes:      Extraocular Movements: EOM normal.      Pupils: Pupils are equal, round, and reactive to light.   Neck:      Thyroid: No thyromegaly.   Cardiovascular:      Rate and Rhythm: Normal rate.   Pulmonary:      Effort: Pulmonary effort is normal.   Abdominal:      Palpations: Abdomen is soft.   Musculoskeletal:      Cervical back: Normal range of motion and neck supple.   Lymphadenopathy:      Cervical: No cervical adenopathy.   Skin:     General: Skin is warm and dry.   Neurological:      Mental Status: She is oriented to person, place, and time.      Motor: Motor strength is normal.      Gait: Gait is intact.      Deep Tendon Reflexes:      Reflex Scores:       Tricep reflexes are 1+ on the right side and 1+ on the left side.       Bicep reflexes are 1+ on the right side and 1+ on the left side.       Brachioradialis reflexes are 1+ on the right side and 1+ on the left side.       Patellar reflexes are 1+ on the right side and 1+ on the left side.  Psychiatric:         Speech: Speech normal.          Behavior: Behavior normal.         Thought Content: Thought content normal.         Lab Results   Component Value Date    WBC 7.11 07/10/2018    HGB 14.4 07/10/2018    HCT 43.4 07/10/2018     07/10/2018    ALT 32 07/10/2018    AST 29 07/10/2018     07/10/2018    K 5.2 (H) 07/10/2018     07/10/2018    CREATININE 1.1 07/10/2018    BUN 19 07/10/2018    CO2 30 (H) 07/10/2018    TSH 1.379 01/09/2014    HGBA1C 6.2 (H) 08/29/2019    WRPIHINP07 >1150 (H) 09/30/2011         Quantitative Myasthenia Gravis Test        Items Tested None Mild Moderate Severe Score   Grade 0 1 2 3    Diplopia (secs) 60 11-59 1-10 Spontaneous    Ptosis (upward gaze, secs) 60 11-59 1-10 Spontaneous    Facial Muscles Normal lid closure Complete, weak, some resistance Complete, without resistance Incomplete    Swallowing (1/2 cup water) Normal Minimal coughing or throat clearing Severe coughing or choking or nasal regurgitation Cannot swallow (test not attempted)    Speech following counting aloud from 1-50 (onset of dysarthria) None at #50 Dysarthria at # 30-49 Dysarthria at #10-29 Dysarthria at #9    Right arm outstretched (90 degrees, sitting) sec 240  10-89 0-9    Left arm outstretched (90 degrees, sitting, secs) 240  10-89 0-9    FVC ?80% 65-79% 50-64% <49%    Right hand  (Male/Female) kg ?45 / ?35 15-44 / 10-29 5-15 / 5-9 0-4 / 0-4    Left hand  (Male/Female) kg ?45 / ?35 15-44 / 10-29 5-15 / 5-9 0-4 / 0-4    Head lifted (45% supine, secs) 120  1-29 0    Right leg outstretched (45-50%, supine) sec 100 31-99 1-30 0    Left leg outstretched (45-50%, supine) sec 100 31-99 1-30 0    Total     2       Myasthenia Gravis- Quality of Life Score (revised) - MG QoL-15r  Please indicate how true each statement has been (over the past four weeks).   Not at all Somewhat Very Much    0 1 2   1. I am frustrated by my MG    X    2. I have trouble with my eyes because of my MG (e.g. double vision)   X     3. I have  trouble eating because of MG   X     4. I have limited my social activity because of my MG    X    5. My MG limits my ability to enjoy hobbies and fun activities   X     6. I have trouble meeting the needs of my family because of my MG   X     7. I have to make plans around my MG    X    8. I am bothered by limitations in performing my work (include work at home) because of my MG  X    9. I have difficulty speaking due to MG   X     10. I have lost some personal independence because of my MG (e.g. driving, shopping, running errands)  X    11. I am depressed about my MG   X     12. I have trouble walking due to MG   X     13. I have trouble getting around public places because of my MG   X     14. I feel overwhelmed by my MG   X     15. I have trouble performing my personal grooming needs due to MG   X      Total MG-QoL15r Score 5             Assessment and Plan:     Problem List Items Addressed This Visit       Myasthenia gravis, AChR antibody positive - Primary    Overview     Ab+, thymoma positive, s/p resection (2011), generalized MG, diagnosed 2011  Prednisone, mestinon, IVIG  Rescue: Plex, IVIG; both moderately helpful  Prior: Imuran -> marrow suppression         Current Assessment & Plan     Doing really well on eculizumab and small dose Prednisone. She tolerated a recent reduction in Prednisone from 10 mg daily to alternating 10/5. She would like to continue to try to reduce steroids, especially since her A1c continues to be elevated.    - Continue eculizumab per protocol Q2 weeks. We discussed the availability of ravulizumab but she is content to stay with current therapy for now.   - Reduce Prednisone from alternating 10mg/5mg to 7.5mg/5mg. New prescription for  2.5 mg was placed. She was instructed to address any breakthrough symptoms with Mestinon and if insufficient response, she can revert back to previous steroids dose.   - Mestinon 60 mg PO Q4-6 hours PRN renewed   - Myasthenia Gravis IMPORTANT  "INFORMATION:    Elective intubation should be considered if serial measurements of the VC show values less than 20 mL/kg or if the NIF is worse than -30 cm H20 or is progressively worsening after serial evaluation.      Absolute Contraindicated Medications (Category 1) Contraindicated Medications (Category 2) Likely to Worsen MG Cautionary Medications  (Category 3) That May Worsen MG but are Usually Tolerated   Curare  Botulinum toxin  D-penicillamine  Interferon alpha    Aminoglycoside (Gentamycin, Kanamycin, Streptomycin, Neomycin, Tobramycin)  Macrolide (Erythromycin, Azithromycin, Telithromycin, Biaxin, Clarithromycin)  Fluoroquinolone - (Ciprofloxacin, Moxifloxacin, Levofloxacin, Delafloxacin, Norfloxacin, Prulifloxacin)  Quinine (Use only for Malaria)  Quinidine  Procainamide  Magnesium salts, IV magnesium replacement  Chloroquine  Hydroxychloroquine  Immune checkpoint inhibitors: Pembrolizumab (Keytruda), Nivolumab (Opdivo), Atezolizumab (Tecentriq), Avelumab (Bavencio), Durvalumab (Imfinzi), Ipilimumab (Yervoy)   Atropine   Corticosteroids  Desferrioxamine  Beta blockers  Statins  Iodinated radiologic contrast agents  Lithium  Benzodiazepines   Calcium Channel Blockers (verapamil)   Doxacurium  Neuromuscular blockades (Succinylcholine, Cisatracurium, Rocuronium, Vecuronium, Atracurium)  Diclomine       THIS PATIENT HAS MYASTHENIA GRAVIS     USE THESE DRUGS WITH CAUTION   1. Antibiotics of the Following Classes               A. Aminoglycosides (end in "mycin", "micin" e.g. Neomycin, tobramycin, gentamicin)               B. Flagyl (metronidazole)               C. Macrolides (e.g. Erythromycin, azithromycin (Zithromax), clarithromycin)   2. Beta Blockers (oral, parenteral and ophthalmic preparations)               A. (end in "olol" e.g. Atenolol, labetalol, metoprolol, propranolol, sotalol, timolol, etc)   3. Calcium Channel Blockers (end in "ipine" e.g. Amlodipine (Norvasc), nicardipine, nifedipine " "(Procardia), felodipine (Plendil))   4. Corticosteroids & ACTH   5. Interferons   6. Magnesium   7. Narcotic analgesics (if there is respiratory compromise e.g. Demerol, morphine)   8. Phenothiazines (end in "zine" e.g. Compazine, chlorpromazine (Thorazine), fluphenazine (Prolixin), perphenazine (Trilafon), Sparine)   9. Respiratory Depressants   10. Sedatives and Hypnotics       THESE DRUGS SHOULD *NOT* BE USED IN PATIENTS WITH MYASTHENIA GRAVIS WITHOUT PRIOR DISCUSSION WITH A NEUROMUSCULAR SPECIALIST   1. Ketolides (e.g. Telithromycin) - FDA BLACK BOX WARNING - Do NOT Use   2. Fluoroquinolones (end in "acin" e.g. Levofloxacin (Levaquin), ciprofloxacin (CIPRO), moxifloxacin (Avelox) - FDA BLACK BOX WARNING   3. Botulinum toxin   4. D-Penicillamine       NURSES -- TRIPLE CHECK BEFORE GIVING THE FOLLOWING DUE TO THE HIGH PROBABILITY OF PROLONGED WEAKNESS OR MG CRISIS   1. Neuromuscular blocking agent (both depolarizing and non-depolarizing)               A. Succinylcholine-like               B. Curare-like   2. Quinidine   3. Quinine           Relevant Medications    predniSONE (DELTASONE) 5 MG tablet    predniSONE (DELTASONE) 2.5 MG tablet    pyridostigmine (MESTINON) 60 mg Tab    Current chronic use of systemic steroids (Chronic)    History of thymectomy    Overview     November 2011  Thymoma+  S/p radiation therapy         Essential hypertension (Chronic)    Current Assessment & Plan     On appropriate medications and managed by PCP. We discussed the importance of managing all secondary stroke risk factors, including hypertension.           Other hyperlipidemia (Chronic)    Current Assessment & Plan     On appropriate medications and managed by PCP. We discussed the importance of managing all secondary stroke risk factors, including hyperlipidemia.           DM type 2 (diabetes mellitus, type 2)    Current Assessment & Plan     On appropriate medications and managed by PCP. We discussed the importance of managing " all secondary stroke risk factors, including DM2.           On prednisone therapy     Follow up visit via Portal in 2-3 months    Hammad Perez MD  [facility]

## 2022-11-04 NOTE — ASSESSMENT & PLAN NOTE
Doing really well on eculizumab and small dose Prednisone. She tolerated a recent reduction in Prednisone from 10 mg daily to alternating 10/5. She would like to continue to try to reduce steroids, especially since her A1c continues to be elevated.    - Continue eculizumab per protocol Q2 weeks. We discussed the availability of ravulizumab but she is content to stay with current therapy for now.   - Reduce Prednisone from alternating 10mg/5mg to 7.5mg/5mg. New prescription for  2.5 mg was placed. She was instructed to address any breakthrough symptoms with Mestinon and if insufficient response, she can revert back to previous steroids dose.   - Mestinon 60 mg PO Q4-6 hours PRN renewed   - Myasthenia Gravis IMPORTANT INFORMATION:    Elective intubation should be considered if serial measurements of the VC show values less than 20 mL/kg or if the NIF is worse than -30 cm H20 or is progressively worsening after serial evaluation.      Absolute Contraindicated Medications (Category 1) Contraindicated Medications (Category 2) Likely to Worsen MG Cautionary Medications  (Category 3) That May Worsen MG but are Usually Tolerated    Curare   Botulinum toxin   D-penicillamine   Interferon alpha     Aminoglycoside (Gentamycin, Kanamycin, Streptomycin, Neomycin, Tobramycin)   Macrolide (Erythromycin, Azithromycin, Telithromycin, Biaxin, Clarithromycin)   Fluoroquinolone - (Ciprofloxacin, Moxifloxacin, Levofloxacin, Delafloxacin, Norfloxacin, Prulifloxacin)   Quinine (Use only for Malaria)   Quinidine   Procainamide   Magnesium salts, IV magnesium replacement   Chloroquine   Hydroxychloroquine   Immune checkpoint inhibitors: Pembrolizumab (Keytruda), Nivolumab (Opdivo), Atezolizumab (Tecentriq), Avelumab (Bavencio), Durvalumab (Imfinzi), Ipilimumab (Yervoy)    Atropine    Corticosteroids   Desferrioxamine   Beta blockers   Statins   Iodinated radiologic contrast agents   Lithium   Benzodiazepines  "   Calcium Channel Blockers (verapamil)    Doxacurium   Neuromuscular blockades (Succinylcholine, Cisatracurium, Rocuronium, Vecuronium, Atracurium)   Diclomine       THIS PATIENT HAS MYASTHENIA GRAVIS     USE THESE DRUGS WITH CAUTION   1. Antibiotics of the Following Classes               A. Aminoglycosides (end in "mycin", "micin" e.g. Neomycin, tobramycin, gentamicin)               B. Flagyl (metronidazole)               C. Macrolides (e.g. Erythromycin, azithromycin (Zithromax), clarithromycin)   2. Beta Blockers (oral, parenteral and ophthalmic preparations)               A. (end in "olol" e.g. Atenolol, labetalol, metoprolol, propranolol, sotalol, timolol, etc)   3. Calcium Channel Blockers (end in "ipine" e.g. Amlodipine (Norvasc), nicardipine, nifedipine (Procardia), felodipine (Plendil))   4. Corticosteroids & ACTH   5. Interferons   6. Magnesium   7. Narcotic analgesics (if there is respiratory compromise e.g. Demerol, morphine)   8. Phenothiazines (end in "zine" e.g. Compazine, chlorpromazine (Thorazine), fluphenazine (Prolixin), perphenazine (Trilafon), Sparine)   9. Respiratory Depressants   10. Sedatives and Hypnotics       THESE DRUGS SHOULD *NOT* BE USED IN PATIENTS WITH MYASTHENIA GRAVIS WITHOUT PRIOR DISCUSSION WITH A NEUROMUSCULAR SPECIALIST   1. Ketolides (e.g. Telithromycin) - FDA BLACK BOX WARNING - Do NOT Use   2. Fluoroquinolones (end in "acin" e.g. Levofloxacin (Levaquin), ciprofloxacin (CIPRO), moxifloxacin (Avelox) - FDA BLACK BOX WARNING   3. Botulinum toxin   4. D-Penicillamine       NURSES -- TRIPLE CHECK BEFORE GIVING THE FOLLOWING DUE TO THE HIGH PROBABILITY OF PROLONGED WEAKNESS OR MG CRISIS   1. Neuromuscular blocking agent (both depolarizing and non-depolarizing)               A. Succinylcholine-like               B. Curare-like   2. Quinidine   3. Quinine    "

## 2023-02-02 ENCOUNTER — OFFICE VISIT (OUTPATIENT)
Dept: NEUROLOGY | Facility: CLINIC | Age: 68
End: 2023-02-02
Payer: MEDICARE

## 2023-02-02 DIAGNOSIS — Z90.89 HISTORY OF THYMECTOMY: ICD-10-CM

## 2023-02-02 DIAGNOSIS — E78.49 OTHER HYPERLIPIDEMIA: Chronic | ICD-10-CM

## 2023-02-02 DIAGNOSIS — E11.00 TYPE 2 DIABETES MELLITUS WITH HYPEROSMOLARITY WITHOUT COMA, WITHOUT LONG-TERM CURRENT USE OF INSULIN: ICD-10-CM

## 2023-02-02 DIAGNOSIS — I10 ESSENTIAL HYPERTENSION: Chronic | ICD-10-CM

## 2023-02-02 DIAGNOSIS — Z79.52 CURRENT CHRONIC USE OF SYSTEMIC STEROIDS: Chronic | ICD-10-CM

## 2023-02-02 DIAGNOSIS — G70.00 MYASTHENIA GRAVIS, ACHR ANTIBODY POSITIVE: Primary | ICD-10-CM

## 2023-02-02 PROCEDURE — 99215 PR OFFICE/OUTPT VISIT, EST, LEVL V, 40-54 MIN: ICD-10-PCS | Mod: 95,,, | Performed by: PSYCHIATRY & NEUROLOGY

## 2023-02-02 PROCEDURE — 99215 OFFICE O/P EST HI 40 MIN: CPT | Mod: 95,,, | Performed by: PSYCHIATRY & NEUROLOGY

## 2023-02-02 RX ORDER — PREDNISONE 5 MG/1
TABLET ORAL
Qty: 180 TABLET | Refills: 3 | Status: SHIPPED | OUTPATIENT
Start: 2023-02-02 | End: 2024-02-14 | Stop reason: SDUPTHER

## 2023-02-02 NOTE — ASSESSMENT & PLAN NOTE
"Stable on Soliris and Prednisone 10/5 mg alternating days. PRN Mestinon.     Myasthenia Gravis IMPORTANT INFORMATION:     Elective intubation should be considered if serial measurements of the VC show values less than 20 mL/kg or if the NIF is worse than -30 cm H20 or is progressively worsening after serial evaluation.        Absolute Contraindicated Medications (Category 1) Contraindicated Medications (Category 2) Likely to Worsen MG Cautionary Medications  (Category 3) That May Worsen MG but are Usually Tolerated   · Curare  · Botulinum toxin  · D-penicillamine  · Interferon alpha     · Aminoglycoside (Gentamycin, Kanamycin, Streptomycin, Neomycin, Tobramycin)  · Macrolide (Erythromycin, Azithromycin, Telithromycin, Biaxin, Clarithromycin)  · Fluoroquinolone - (Ciprofloxacin, Moxifloxacin, Levofloxacin, Delafloxacin, Norfloxacin, Prulifloxacin)  · Quinine (Use only for Malaria)  · Quinidine  · Procainamide  · Magnesium salts, IV magnesium replacement  · Chloroquine  · Hydroxychloroquine  · Immune checkpoint inhibitors: Pembrolizumab (Keytruda), Nivolumab (Opdivo), Atezolizumab (Tecentriq), Avelumab (Bavencio), Durvalumab (Imfinzi), Ipilimumab (Yervoy)    · Atropine   · Corticosteroids  · Desferrioxamine  · Beta blockers  · Statins  · Iodinated radiologic contrast agents  · Lithium  · Benzodiazepines   · Calcium Channel Blockers (verapamil)   · Doxacurium  · Neuromuscular blockades (Succinylcholine, Cisatracurium, Rocuronium, Vecuronium, Atracurium)  · Diclomine         THIS PATIENT HAS MYASTHENIA GRAVIS     USE THESE DRUGS WITH CAUTION   1. Antibiotics of the Following Classes               A. Aminoglycosides (end in "mycin", "micin" e.g. Neomycin, tobramycin, gentamicin)               B. Flagyl (metronidazole)               C. Macrolides (e.g. Erythromycin, azithromycin (Zithromax), clarithromycin)   2. Beta Blockers (oral, parenteral and ophthalmic preparations)               A. (end in "olol" e.g. Atenolol, " "labetalol, metoprolol, propranolol, sotalol, timolol, etc)   3. Calcium Channel Blockers (end in "ipine" e.g. Amlodipine (Norvasc), nicardipine, nifedipine (Procardia), felodipine (Plendil))   4. Corticosteroids & ACTH   5. Interferons   6. Magnesium   7. Narcotic analgesics (if there is respiratory compromise e.g. Demerol, morphine)   8. Phenothiazines (end in "zine" e.g. Compazine, chlorpromazine (Thorazine), fluphenazine (Prolixin), perphenazine (Trilafon), Sparine)   9. Respiratory Depressants   10. Sedatives and Hypnotics       THESE DRUGS SHOULD *NOT* BE USED IN PATIENTS WITH MYASTHENIA GRAVIS WITHOUT PRIOR DISCUSSION WITH A NEUROMUSCULAR SPECIALIST   1. Ketolides (e.g. Telithromycin) - FDA BLACK BOX WARNING - Do NOT Use   2. Fluoroquinolones (end in "acin" e.g. Levofloxacin (Levaquin), ciprofloxacin (CIPRO), moxifloxacin (Avelox) - FDA BLACK BOX WARNING   3. Botulinum toxin   4. D-Penicillamine       NURSES -- TRIPLE CHECK BEFORE GIVING THE FOLLOWING DUE TO THE HIGH PROBABILITY OF PROLONGED WEAKNESS OR MG CRISIS   1. Neuromuscular blocking agent (both depolarizing and non-depolarizing)               A. Succinylcholine-like               B. Curare-like   2. Quinidine   3. Quinine      "

## 2023-02-02 NOTE — PROGRESS NOTES
Subjective:     Chief Complaint:  AChR Antibody Positive MG    Interval History 02/05/2023  This visit was conducted using a virtual visit with secure two-way interactive video. Provider is located in my office at [facility] and the patient is located within the [facility]. By connecting the patient consented to all virtual visit therapy and treatment planning.     I have reviewed all relevant history in Epic. The patient presents for routine follow up regarding AChR antibody positive MG. Her current therapy is soliris [id number], prednisone 7.5/5mg every other day respectively, and Mestinon q4-6 hours PRN for weakness. She notes that her most recent soliris infusion was on Monday and went well. She denies any associated symptoms following her infusion. She found that when she tried to reduce her prednisone to 7.5mg/5mg every other day she was having breakthrought symptoms and so she has gone back to using 10mg/5mg every other day respectively.     Myasthenia Gravis Activities of Daily Living Scale 2/2/2023     Grade   Function 0 1 2 3   Double Vision None Occasional, not every day Daily, but not constant Constant          Eyelid Droop None Occasional, not every day Daily, but not constant Constant          Talking Normal Intermittent slurring or nasal speech Constant slurring or nasal speech, but can be understood Speech difficult to understand          Chewing Normal Fatigues with solid food Fatigues with soft food Gastric tube          Swallowing Normal Chokes rarely Frequent choking requiring change of diet Gastric tube          Breathing Normal Shortness of breath on exertion Shortness of breath at rest Ventilator          Brushing teeth or hair Normal Requires extra effort but requires no rest period Rest periods needed Cannot do one or more of these functions          Ability to rise from chair or toilet Normal Sometimes uses arms Always uses arms Requires assistance          Total MG ADL  Score 1             Interval History 11/3/2022 - I have reviewed relevant medical records in [facility]. Here for a routine follow up for antibody positive MG on Soliris per protocol as well as Prednisone alternating 10 mg and 5 mg. She reports doing really well and is without focal or fatigable weaknesses, no diplopia or ptosis, no dysphagia, dysphonia, dysarthria, YU, orthopnea, or gait changes. She does report that she is strong and can easily do whatever she wants to do physically but feels like she is not as strong overall like she was preciously. When asked directly what it is that she cannot do now that she used to could do, she says that she is able to do everything.    After last visit she weaned Prednisone from 10 daily to 10/5 alternating days and noted no relapses in strength or function. She rarely uses Mestinon.    She reports tolerating eculizumab infusions just fine. She sometimes has a mild headache afterwards but is not bothered too much by them. She gets home infusions. She is up to date in her vaccinations for meningococcal ppx.    Interval History 2/14/2022 - I have reviewed relevant medical records in Epic. Here for routine follow up for AChR Ab+ MG on eculizumab infusions Q2 weeks and taking Prednisone 10/7.5 alternating days, decreased from 10 mg daily in December 2021. She has tolerated the steroids reduction well and without breakthrough weakness. She gets home infusions of the eculizumab and tolerates them without adverse reactions. She has complaints of occasional L UE and L LE pain with some secondary weakness that occurs with exercise and which improves with exercise and movement. She has had her A1c rechecked recently and it's currently at 9.0%, increased from 4.8% at last visit. She reports indulging in sweets over the past few months.    Interval History 12.13.2021 - I have reviewed relevant medical records in Epic. Here for routine follow up for AChR Ab+ MG, now on eculizumab, which was  started in late October / early November 2021. She was previously on IVIg 1g/kg Q4 weeks and was having relapses in MG-related weaknesses at about 2-3 weeks after each infusion. She stopped IVIg shortly after starting eculizumab. She is also taking Prednisone 10 mg daily. She reports good results with eculizumab, with less evidence of relapse in between infusions compared with IVIg. She does say that sometimes she just feels tired all over and has a difficult time getting going, though she denies any jorge luis bulbar or extremity weaknesses recently. She has Mestinon 60 mg for PRN use but has been taking TID scheduled instead. No adverse GI side effects. Recent A1c is improved from 6.2% to 4.8%.    Interval History 5.13.2021 - I have reviewed relevant medical records in Epic. Here for routine follow up for AChR-Ab+ MG. She is taking Prednisone 10 mg daily and is getting home infusions of IVIg 2g/kg Q4 weeks. She is having some relapse weakness in between infusions and taking Mestinon to address with limited success. She responds well to IVIg for 1-2 weeks after infusion, then seems to develop more daily fatigue, gait strength decline, diplopia, and ptosis. She is not interested in increasing Prednisone due to DM2. She had been on IVIg Q6 weeks and at last visit we increased to Q4. She is interested in starting eculizumab since the Ig seems to remain effective for only 1-2 weeks. She has not had any episode worrisome for NM respiratory failure.       History of Present Illness:  I have reviewed all relevant medical records in Epic.     Louisa Quarles is a 67 y.o. female who presents today for initial visit with me for AChRAb+ myasthenia gravis, previously followed by [facility] and in the [facility]. Maintained on IVIg and Prednisone 10 mg daily. She has recently had her IVIg regimen changed to 50g daily x 3 days every 4 weeks per [facility] (previously on same dose Q6 weeks). She has noticed an improvement  "in her strength overall, though does report some days of feeling "worn down" but cannot elaborate or specify exactly which groups of muscles or which functions are impaired. She has been taking Mestinon occasionally and is unaware that it is meant to be used as PRN for temporary reversal of MG-related weaknesses. She denies any dysphagia or dysarthria or dysphonia. She has occasional diplopia but no ptosis. She has been exercising at home and reports occasional UE and LE weakness. She had been experiencing relapses in weakness toward the end of her 6 week interval in between Ig infusions. With the change to Q4 weeks, she notes a decline in that phenomenon. She tolerates the infusions well, though occasionally gets headaches during and after. These respond well to Tylenol 1g. She reports good hydration the day prior to her IVIg.    She had thymectomy in 2012, no thymoma  MG diagnosis in about 2011  Some osteoporosis on most recent dexa scan, now on vitamin D supplements per PCP    Secondary stroke risk factors of DM2, HLD, HTN    From Previous Notes:    (Dx 2011, s/p thymectomy + adjuvant RTX), who is presenting to the clinic today for f/u on MG.     Interval Hx (2/10/20):  Continues to complain of generalized muscle weakness. Reports mild worsening of swallowing and intermittent chocking on food. Denies diplopia or ptosis. Currently on Mestinon 30 mg BID, prednisone 10 mg daily and IVIG q6 weeks. She also reports continued medication side effect such as increased secretions and drooling (mestinon) and loss of taste (steroids). She thinks that the effect of IVIG does not last for the whole 6 weeks. She is still not interested in switching to Soliris or seeing neuromuscular specialist.        Interval Hx (11/18/19):  Patient is 20 minutes late to her appointment, however I spent 30 minutes with her. Overall feels ok, has good and bad days, depending on the day she has and/or stress levels.   She denies any double " vision, SOB or choking on food or water unless she is eating fast. She feels weaker in the mornings rather than evenings. Continued on IVIG q6 weeks (due on 11/20), and mestinon 30 mg BID (morning and lunch), and prednisone 15 mg daily.  She is also inquiring about safety of rotator cuff surgery from MG standpoint, provided a print of contraindicated medication (including anesthetics) in myasthenic patients.   Patient is also inquiring about Soliris that we discussed last time. I provided her with printed information again. Explained to her that she should receive infusions weekly x4 weeks and on 5th week will switch to biweekly infusion, each taking 35 minutes. Patient is not interested at this time due to difficulty of transportation every week.         Initial encounter (8/29/19):  Ms Qaurles is new to me, she was previously followed by Dr. Abrams and then Dr. Montilla. She was last seen in the clinic in 5/14/19. Per review of charts and previous notes; patient was maintained on prednisone, Mestinon and Imuran in the past. Patient has received PLEX in the past with minimal response (last on Feb 2014). Imuran had to be discontinued in 2015 due to bone marrow suppression. Frequency and dosing of Mestinon and prednisone have been increased throughout the years due to progression of symptoms. Most recently she has been maintained on Prednisone 15 mg QD (tapered from 20 during last visit), Mestinon 30 mg TID; IVIG Q6 weeks (last dose 8/28).     Today she is reporting fluctuations in her symptoms, she has good and bad days (generalized weakness). She thinks IVIG might give her a boost for a couple of weeks and then she gradually gets back to where she was. She can not tell if tapering steroids has effected her symptoms. Today she denies any double vision, droopy eyelids, slurred speech, nasal speech or difficulty breathing. She intermittently chokes on solid food so she takes small bites. She complains of drooling  and increased drainage from sinuses. She does not have any difficulty with walking long distances or working with arms. Recently her vision is more blurred and that causes her to have headaches. She has an appointment with her ophthalmologist in October. Patient also c/o losing the taste and dryness of lips.     Review of Systems  Review of Systems   Constitutional:  Negative for activity change, fatigue and fever.   HENT:  Negative for hearing loss, trouble swallowing and voice change.    Eyes:  Negative for pain, redness and visual disturbance.   Respiratory:  Negative for choking, chest tightness and shortness of breath.    Cardiovascular:  Negative for chest pain.   Gastrointestinal:  Negative for abdominal pain, nausea and vomiting.   Endocrine: Negative for cold intolerance.   Genitourinary:  Negative for frequency.   Musculoskeletal:  Negative for arthralgias, back pain, gait problem, joint swelling, myalgias and neck pain.   Skin:  Negative for color change.   Allergic/Immunologic: Negative for immunocompromised state.   Neurological:  Positive for weakness. Negative for dizziness, seizures, speech difficulty, numbness and headaches.   Hematological:  Negative for adenopathy.   Psychiatric/Behavioral:  Negative for agitation, behavioral problems, dysphoric mood and suicidal ideas.       Objective:     Limited due to virtual exam       Neurologic Exam     Mental Status   Oriented to person, place, and time.   Attention: normal.   Speech: speech is normal   Level of consciousness: alert  Knowledge: good.   Able to count to 25 in one breath   Able to maintain upward gaze greater than 15 seconds without getting double vision.      Cranial Nerves     CN III, IV, VI   Extraocular motions are normal.   Diplopia: bilateral and horizontal    CN VII   Facial expression full, symmetric.     CN VIII   Hearing: intact    Maintains upward gaze 30+ seconds without drop.     Motor Exam   Muscle bulk: normal    Sensory Exam    Light touch normal.   Vibration normal.     Gait, Coordination, and Reflexes     Tremor   Resting tremor: absent  Intention tremor: absent  Action tremor: absent    Physical Exam  Constitutional:       Appearance: She is well-developed.   HENT:      Head: Normocephalic and atraumatic.   Eyes:      Extraocular Movements: EOM normal.   Neck:      Thyroid: No thyromegaly.   Musculoskeletal:      Cervical back: Normal range of motion.   Lymphadenopathy:      Cervical: No cervical adenopathy.   Neurological:      Mental Status: She is oriented to person, place, and time.   Psychiatric:         Speech: Speech normal.         Behavior: Behavior normal.         Thought Content: Thought content normal.         Lab Results   Component Value Date    WBC 7.11 07/10/2018    HGB 14.4 07/10/2018    HCT 43.4 07/10/2018     07/10/2018    ALT 32 07/10/2018    AST 29 07/10/2018     07/10/2018    K 5.2 (H) 07/10/2018     07/10/2018    CREATININE 1.1 07/10/2018    BUN 19 07/10/2018    CO2 30 (H) 07/10/2018    TSH 1.379 01/09/2014    HGBA1C 6.2 (H) 08/29/2019    CDXYSTRT63 >1150 (H) 09/30/2011         Quantitative Myasthenia Gravis Test        Items Tested None Mild Moderate Severe Score   Grade 0 1 2 3    Diplopia (secs) 60 11-59 1-10 Spontaneous    Ptosis (upward gaze, secs) 60 11-59 1-10 Spontaneous    Facial Muscles Normal lid closure Complete, weak, some resistance Complete, without resistance Incomplete    Swallowing (1/2 cup water) Normal Minimal coughing or throat clearing Severe coughing or choking or nasal regurgitation Cannot swallow (test not attempted)    Speech following counting aloud from 1-50 (onset of dysarthria) None at #50 Dysarthria at # 30-49 Dysarthria at #10-29 Dysarthria at #9    Right arm outstretched (90 degrees, sitting) sec 240  10-89 0-9    Left arm outstretched (90 degrees, sitting, secs) 240  10-89 0-9    FVC ?80% 65-79% 50-64% <49%    Right hand  (Male/Female) kg ?45 /  ?35 15-44 / 10-29 5-15 / 5-9 0-4 / 0-4    Left hand  (Male/Female) kg ?45 / ?35 15-44 / 10-29 5-15 / 5-9 0-4 / 0-4    Head lifted (45% supine, secs) 120  1-29 0    Right leg outstretched (45-50%, supine) sec 100 31-99 1-30 0    Left leg outstretched (45-50%, supine) sec 100 31-99 1-30 0    Total     2       Myasthenia Gravis- Quality of Life Score (revised) - MG QoL-15r  Please indicate how true each statement has been (over the past four weeks).   Not at all Somewhat Very Much    0 1 2   1. I am frustrated by my MG    X    2. I have trouble with my eyes because of my MG (e.g. double vision)   X     3. I have trouble eating because of MG   X     4. I have limited my social activity because of my MG    X    5. My MG limits my ability to enjoy hobbies and fun activities   X     6. I have trouble meeting the needs of my family because of my MG   X     7. I have to make plans around my MG    X    8. I am bothered by limitations in performing my work (include work at home) because of my MG  X    9. I have difficulty speaking due to MG   X     10. I have lost some personal independence because of my MG (e.g. driving, shopping, running errands)  X    11. I am depressed about my MG   X     12. I have trouble walking due to MG   X     13. I have trouble getting around public places because of my MG   X     14. I feel overwhelmed by my MG   X     15. I have trouble performing my personal grooming needs due to MG   X      Total MG-QoL15r Score 5             Assessment and Plan:     Problem List Items Addressed This Visit       Myasthenia gravis, AChR antibody positive - Primary    Overview     Ab+, thymoma positive, s/p resection (2011), generalized MG, diagnosed 2011  Current Regimen:   Soliris [id number]  Prednisone 5mg/10mg alternating days respectively  Mestinon 60mg Q4-6 hours PRN for weakness      Previously Tried:   PLEX  IVIg  Prior: Imuran -> marrow suppression    2/2/23 MG-ADL: 1             Current Assessment  "& Plan     Stable on Soliris and Prednisone 10/5 mg alternating days. PRN Mestinon.     Myasthenia Gravis IMPORTANT INFORMATION:     Elective intubation should be considered if serial measurements of the VC show values less than 20 mL/kg or if the NIF is worse than -30 cm H20 or is progressively worsening after serial evaluation.        Absolute Contraindicated Medications (Category 1) Contraindicated Medications (Category 2) Likely to Worsen MG Cautionary Medications  (Category 3) That May Worsen MG but are Usually Tolerated   Curare  Botulinum toxin  D-penicillamine  Interferon alpha     Aminoglycoside (Gentamycin, Kanamycin, Streptomycin, Neomycin, Tobramycin)  Macrolide (Erythromycin, Azithromycin, Telithromycin, Biaxin, Clarithromycin)  Fluoroquinolone - (Ciprofloxacin, Moxifloxacin, Levofloxacin, Delafloxacin, Norfloxacin, Prulifloxacin)  Quinine (Use only for Malaria)  Quinidine  Procainamide  Magnesium salts, IV magnesium replacement  Chloroquine  Hydroxychloroquine  Immune checkpoint inhibitors: Pembrolizumab (Keytruda), Nivolumab (Opdivo), Atezolizumab (Tecentriq), Avelumab (Bavencio), Durvalumab (Imfinzi), Ipilimumab (Yervoy)    Atropine   Corticosteroids  Desferrioxamine  Beta blockers  Statins  Iodinated radiologic contrast agents  Lithium  Benzodiazepines   Calcium Channel Blockers (verapamil)   Doxacurium  Neuromuscular blockades (Succinylcholine, Cisatracurium, Rocuronium, Vecuronium, Atracurium)  Diclomine         THIS PATIENT HAS MYASTHENIA GRAVIS     USE THESE DRUGS WITH CAUTION   1. Antibiotics of the Following Classes               A. Aminoglycosides (end in "mycin", "micin" e.g. Neomycin, tobramycin, gentamicin)               B. Flagyl (metronidazole)               C. Macrolides (e.g. Erythromycin, azithromycin (Zithromax), clarithromycin)   2. Beta Blockers (oral, parenteral and ophthalmic preparations)               A. (end in "olol" e.g. Atenolol, labetalol, metoprolol, propranolol, sotalol, " "timolol, etc)   3. Calcium Channel Blockers (end in "ipine" e.g. Amlodipine (Norvasc), nicardipine, nifedipine (Procardia), felodipine (Plendil))   4. Corticosteroids & ACTH   5. Interferons   6. Magnesium   7. Narcotic analgesics (if there is respiratory compromise e.g. Demerol, morphine)   8. Phenothiazines (end in "zine" e.g. Compazine, chlorpromazine (Thorazine), fluphenazine (Prolixin), perphenazine (Trilafon), Sparine)   9. Respiratory Depressants   10. Sedatives and Hypnotics       THESE DRUGS SHOULD *NOT* BE USED IN PATIENTS WITH MYASTHENIA GRAVIS WITHOUT PRIOR DISCUSSION WITH A NEUROMUSCULAR SPECIALIST   1. Ketolides (e.g. Telithromycin) - FDA BLACK BOX WARNING - Do NOT Use   2. Fluoroquinolones (end in "acin" e.g. Levofloxacin (Levaquin), ciprofloxacin (CIPRO), moxifloxacin (Avelox) - FDA BLACK BOX WARNING   3. Botulinum toxin   4. D-Penicillamine       NURSES -- TRIPLE CHECK BEFORE GIVING THE FOLLOWING DUE TO THE HIGH PROBABILITY OF PROLONGED WEAKNESS OR MG CRISIS   1. Neuromuscular blocking agent (both depolarizing and non-depolarizing)               A. Succinylcholine-like               B. Curare-like   2. Quinidine   3. Quinine             Relevant Medications    predniSONE (DELTASONE) 5 MG tablet    Current chronic use of systemic steroids (Chronic)    History of thymectomy    Overview     November 2011  Thymoma+  S/p radiation therapy         Essential hypertension (Chronic)    Current Assessment & Plan     Discussed with patient importance of management of hypertension due to secondary stroke risk. Patient is on appropriate therapy currently managed by PCP.            Other hyperlipidemia (Chronic)    Current Assessment & Plan     Patients hyperlipidemia is currently stable and well managed by the PCP. Discussed with the patient the associated stroke risk.            DM type 2 (diabetes mellitus, type 2)    Current Assessment & Plan     Discussion of stroke risk with associated DM2 and stressed " proper blood sugar control, patient expressed understanding. All questions were answered.             Follow Up in 4 months    MALKA Bettencourt MD  [facility]

## 2023-02-02 NOTE — ASSESSMENT & PLAN NOTE
Discussion of stroke risk with associated DM2 and stressed proper blood sugar control, patient expressed understanding. All questions were answered.

## 2023-06-09 ENCOUNTER — TELEPHONE (OUTPATIENT)
Dept: NEUROLOGY | Facility: CLINIC | Age: 68
End: 2023-06-09
Payer: MEDICARE

## 2023-06-09 NOTE — TELEPHONE ENCOUNTER
----- Message from Gabriela Alex sent at 6/9/2023  9:07 AM CDT -----  Regarding: pt advice  Contact: [phone]  Saida calling in regards to pt medication Soliris, for continuation or ongoing. Pls call

## 2023-06-12 NOTE — TELEPHONE ENCOUNTER
Returned call to [facility], provided verbal order for Soliris on behalf of Dr. Perez and provided fax number so they can send over the orders for Dr. Phillips's signature.

## 2023-12-11 DIAGNOSIS — G70.00 MYASTHENIA GRAVIS, ACHR ANTIBODY POSITIVE: ICD-10-CM

## 2023-12-12 DIAGNOSIS — G70.00 MYASTHENIA GRAVIS, ACHR ANTIBODY POSITIVE: ICD-10-CM

## 2023-12-12 RX ORDER — PYRIDOSTIGMINE BROMIDE 60 MG/1
TABLET ORAL
Qty: 120 TABLET | Refills: 11 | Status: SHIPPED | OUTPATIENT
Start: 2023-12-12

## 2023-12-12 RX ORDER — PYRIDOSTIGMINE BROMIDE 60 MG/1
TABLET ORAL
Qty: 120 TABLET | Refills: 0 | Status: SHIPPED | OUTPATIENT
Start: 2023-12-12 | End: 2023-12-12 | Stop reason: SDUPTHER

## 2024-02-14 DIAGNOSIS — G70.00 MYASTHENIA GRAVIS, ACHR ANTIBODY POSITIVE: ICD-10-CM

## 2024-02-14 RX ORDER — PREDNISONE 5 MG/1
TABLET ORAL
Qty: 180 TABLET | Refills: 3 | Status: SHIPPED | OUTPATIENT
Start: 2024-02-14 | End: 2024-02-29 | Stop reason: DRUGHIGH

## 2024-02-22 ENCOUNTER — TELEPHONE (OUTPATIENT)
Dept: NEUROLOGY | Facility: CLINIC | Age: 69
End: 2024-02-22
Payer: MEDICARE

## 2024-02-29 ENCOUNTER — OFFICE VISIT (OUTPATIENT)
Dept: NEUROLOGY | Facility: CLINIC | Age: 69
End: 2024-02-29
Payer: MEDICARE

## 2024-02-29 VITALS
SYSTOLIC BLOOD PRESSURE: 153 MMHG | HEIGHT: 70 IN | DIASTOLIC BLOOD PRESSURE: 75 MMHG | WEIGHT: 165.81 LBS | HEART RATE: 98 BPM | BODY MASS INDEX: 23.74 KG/M2

## 2024-02-29 DIAGNOSIS — Z90.89 HISTORY OF THYMECTOMY: ICD-10-CM

## 2024-02-29 DIAGNOSIS — Z79.52 ON PREDNISONE THERAPY: ICD-10-CM

## 2024-02-29 DIAGNOSIS — E78.49 OTHER HYPERLIPIDEMIA: Chronic | ICD-10-CM

## 2024-02-29 DIAGNOSIS — G70.00 MYASTHENIA GRAVIS, ACHR ANTIBODY POSITIVE: Primary | ICD-10-CM

## 2024-02-29 DIAGNOSIS — E11.21 TYPE 2 DIABETES MELLITUS WITH DIABETIC NEPHROPATHY, WITHOUT LONG-TERM CURRENT USE OF INSULIN: ICD-10-CM

## 2024-02-29 DIAGNOSIS — I10 ESSENTIAL HYPERTENSION: Chronic | ICD-10-CM

## 2024-02-29 DIAGNOSIS — Z79.899 LONG TERM CURRENT USE OF IMMUNOSUPPRESSIVE DRUG: ICD-10-CM

## 2024-02-29 DIAGNOSIS — Z79.52 CURRENT CHRONIC USE OF SYSTEMIC STEROIDS: Chronic | ICD-10-CM

## 2024-02-29 PROCEDURE — G2211 COMPLEX E/M VISIT ADD ON: HCPCS | Mod: S$PBB,,, | Performed by: PSYCHIATRY & NEUROLOGY

## 2024-02-29 PROCEDURE — 99999 PR PBB SHADOW E&M-EST. PATIENT-LVL V: CPT | Mod: PBBFAC,,, | Performed by: PSYCHIATRY & NEUROLOGY

## 2024-02-29 PROCEDURE — 99214 OFFICE O/P EST MOD 30 MIN: CPT | Mod: S$PBB,,, | Performed by: PSYCHIATRY & NEUROLOGY

## 2024-02-29 PROCEDURE — 99215 OFFICE O/P EST HI 40 MIN: CPT | Mod: PBBFAC | Performed by: PSYCHIATRY & NEUROLOGY

## 2024-02-29 RX ORDER — PREDNISONE 5 MG/1
TABLET ORAL
Qty: 60 TABLET | Refills: 5 | Status: SHIPPED | OUTPATIENT
Start: 2024-02-29

## 2024-02-29 NOTE — PROGRESS NOTES
Subjective:     Chief Complaint:  AChR Antibody Positive MG    Interval History 02/29/2024    I have reviewed all relevant history in Epic. The patient presents for routine follow up regarding AChR antibody positive MG for which she is managed with soliris [id number], prednisone 5mg/10mg every other day, and mestinon 60mg q4-6 hours PRN for weakness. She notes that she was having some difficulty with swallowing and received a swallow study which showed evidence of a stricture of the esophagus so she is set to get this repaired. Discussed that this is not a side effect of MG. She does note some occasional weakness which does not prevent her from doing activities but does not find it impairs her daily activities. She does not take the mestinon scheduled, sometimes forgets. She notes that she also takes a 1/2 tablet of mesitnon (30mg) rather than full 60 mg.       Myasthenia Gravis Activities of Daily Living Scale 2/29/24     Grade   Function 0 1 2 3   Double Vision None Occasional, not every day Daily, but not constant Constant          Eyelid Droop None Occasional, not every day Daily, but not constant Constant          Talking Normal Intermittent slurring or nasal speech Constant slurring or nasal speech, but can be understood Speech difficult to understand          Chewing Normal Fatigues with solid food Fatigues with soft food Gastric tube          Swallowing Normal Chokes rarely  Does have diagnosed esophageal stricture Frequent choking requiring change of diet Gastric tube          Breathing Normal Shortness of breath on exertion Shortness of breath at rest Ventilator          Brushing teeth or hair Normal Requires extra effort but requires no rest period Rest periods needed Cannot do one or more of these functions          Ability to rise from chair or toilet Normal Sometimes uses arms Always uses arms Requires assistance          Total MG ADL Score 1               Interval History 01/02/2023  This visit was  conducted using a virtual visit with secure two-way interactive video. Provider is located in my office at [facility] and the patient is located within the [facility]. By connecting the patient consented to all virtual visit therapy and treatment planning.     I have reviewed all relevant history in Epic. The patient presents for routine follow up regarding AChR antibody positive MG. Her current therapy is soliris [id number], prednisone 7.5/5mg every other day respectively, and Mestinon q4-6 hours PRN for weakness. She notes that her most recent soliris infusion was on Monday and went well. She denies any associated symptoms following her infusion. She found that when she tried to reduce her prednisone to 7.5mg/5mg every other day she was having breakthrought symptoms and so she has gone back to using 10mg/5mg every other day respectively.       Interval History 11/3/2022 - I have reviewed relevant medical records in [facility]. Here for a routine follow up for antibody positive MG on Soliris per protocol as well as Prednisone alternating 10 mg and 5 mg. She reports doing really well and is without focal or fatigable weaknesses, no diplopia or ptosis, no dysphagia, dysphonia, dysarthria, YU, orthopnea, or gait changes. She does report that she is strong and can easily do whatever she wants to do physically but feels like she is not as strong overall like she was preciously. When asked directly what it is that she cannot do now that she used to could do, she says that she is able to do everything.    After last visit she weaned Prednisone from 10 daily to 10/5 alternating days and noted no relapses in strength or function. She rarely uses Mestinon.    She reports tolerating eculizumab infusions just fine. She sometimes has a mild headache afterwards but is not bothered too much by them. She gets home infusions. She is up to date in her vaccinations for meningococcal ppx.    Interval History 2/14/2022 - I  have reviewed relevant medical records in Epic. Here for routine follow up for AChR Ab+ MG on eculizumab infusions Q2 weeks and taking Prednisone 10/7.5 alternating days, decreased from 10 mg daily in December 2021. She has tolerated the steroids reduction well and without breakthrough weakness. She gets home infusions of the eculizumab and tolerates them without adverse reactions. She has complaints of occasional L UE and L LE pain with some secondary weakness that occurs with exercise and which improves with exercise and movement. She has had her A1c rechecked recently and it's currently at 9.0%, increased from 4.8% at last visit. She reports indulging in sweets over the past few months.    Interval History 12.13.2021 - I have reviewed relevant medical records in Epic. Here for routine follow up for AChR Ab+ MG, now on eculizumab, which was started in late October / early November 2021. She was previously on IVIg 1g/kg Q4 weeks and was having relapses in MG-related weaknesses at about 2-3 weeks after each infusion. She stopped IVIg shortly after starting eculizumab. She is also taking Prednisone 10 mg daily. She reports good results with eculizumab, with less evidence of relapse in between infusions compared with IVIg. She does say that sometimes she just feels tired all over and has a difficult time getting going, though she denies any jorge luis bulbar or extremity weaknesses recently. She has Mestinon 60 mg for PRN use but has been taking TID scheduled instead. No adverse GI side effects. Recent A1c is improved from 6.2% to 4.8%.    Interval History 5.13.2021 - I have reviewed relevant medical records in Epic. Here for routine follow up for AChR-Ab+ MG. She is taking Prednisone 10 mg daily and is getting home infusions of IVIg 2g/kg Q4 weeks. She is having some relapse weakness in between infusions and taking Mestinon to address with limited success. She responds well to IVIg for 1-2 weeks after infusion, then seems  "to develop more daily fatigue, gait strength decline, diplopia, and ptosis. She is not interested in increasing Prednisone due to DM2. She had been on IVIg Q6 weeks and at last visit we increased to Q4. She is interested in starting eculizumab since the Ig seems to remain effective for only 1-2 weeks. She has not had any episode worrisome for NM respiratory failure.       History of Present Illness:  I have reviewed all relevant medical records in Epic.     Louisa Quarles is a 68 y.o. female who presents today for initial visit with me for AChRAb+ myasthenia gravis, previously followed by [facility] and in [facility]. Maintained on IVIg and Prednisone 10 mg daily. She has recently had her IVIg regimen changed to 50g daily x 3 days every 4 weeks per [facility] (previously on same dose Q6 weeks). She has noticed an improvement in her strength overall, though does report some days of feeling "worn down" but cannot elaborate or specify exactly which groups of muscles or which functions are impaired. She has been taking Mestinon occasionally and is unaware that it is meant to be used as PRN for temporary reversal of MG-related weaknesses. She denies any dysphagia or dysarthria or dysphonia. She has occasional diplopia but no ptosis. She has been exercising at home and reports occasional UE and LE weakness. She had been experiencing relapses in weakness toward the end of her 6 week interval in between Ig infusions. With the change to Q4 weeks, she notes a decline in that phenomenon. She tolerates the infusions well, though occasionally gets headaches during and after. These respond well to Tylenol 1g. She reports good hydration the day prior to her IVIg.    She had thymectomy in 2012, no thymoma  MG diagnosis in about 2011  Some osteoporosis on most recent dexa scan, now on vitamin D supplements per PCP    Secondary stroke risk factors of DM2, HLD, HTN    From Previous Notes:    (Dx 2011, s/p thymectomy + " adjuvant RTX), who is presenting to the clinic today for f/u on MG.     Interval Hx (2/10/20):  Continues to complain of generalized muscle weakness. Reports mild worsening of swallowing and intermittent chocking on food. Denies diplopia or ptosis. Currently on Mestinon 30 mg BID, prednisone 10 mg daily and IVIG q6 weeks. She also reports continued medication side effect such as increased secretions and drooling (mestinon) and loss of taste (steroids). She thinks that the effect of IVIG does not last for the whole 6 weeks. She is still not interested in switching to Soliris or seeing neuromuscular specialist.        Interval Hx (11/18/19):  Patient is 20 minutes late to her appointment, however I spent 30 minutes with her. Overall feels ok, has good and bad days, depending on the day she has and/or stress levels.   She denies any double vision, SOB or choking on food or water unless she is eating fast. She feels weaker in the mornings rather than evenings. Continued on IVIG q6 weeks (due on 11/20), and mestinon 30 mg BID (morning and lunch), and prednisone 15 mg daily.  She is also inquiring about safety of rotator cuff surgery from MG standpoint, provided a print of contraindicated medication (including anesthetics) in myasthenic patients.   Patient is also inquiring about Soliris that we discussed last time. I provided her with printed information again. Explained to her that she should receive infusions weekly x4 weeks and on 5th week will switch to biweekly infusion, each taking 35 minutes. Patient is not interested at this time due to difficulty of transportation every week.         Initial encounter (8/29/19):  Ms Quarles is new to me, she was previously followed by Dr. Abrams and then Dr. Montilla. She was last seen in the clinic in 5/14/19. Per review of charts and previous notes; patient was maintained on prednisone, Mestinon and Imuran in the past. Patient has received PLEX in the past with minimal  response (last on Feb 2014). Imuran had to be discontinued in 2015 due to bone marrow suppression. Frequency and dosing of Mestinon and prednisone have been increased throughout the years due to progression of symptoms. Most recently she has been maintained on Prednisone 15 mg QD (tapered from 20 during last visit), Mestinon 30 mg TID; IVIG Q6 weeks (last dose 8/28).     Today she is reporting fluctuations in her symptoms, she has good and bad days (generalized weakness). She thinks IVIG might give her a boost for a couple of weeks and then she gradually gets back to where she was. She can not tell if tapering steroids has effected her symptoms. Today she denies any double vision, droopy eyelids, slurred speech, nasal speech or difficulty breathing. She intermittently chokes on solid food so she takes small bites. She complains of drooling and increased drainage from sinuses. She does not have any difficulty with walking long distances or working with arms. Recently her vision is more blurred and that causes her to have headaches. She has an appointment with her ophthalmologist in October. Patient also c/o losing the taste and dryness of lips.     Review of Systems  Review of Systems   Constitutional:  Negative for activity change, fatigue and fever.   HENT:  Negative for hearing loss, trouble swallowing and voice change.    Eyes:  Negative for pain, redness and visual disturbance.   Respiratory:  Negative for choking, chest tightness and shortness of breath.    Cardiovascular:  Negative for chest pain.   Gastrointestinal:  Negative for abdominal pain, nausea and vomiting.   Endocrine: Negative for cold intolerance.   Genitourinary:  Negative for frequency.   Musculoskeletal:  Negative for arthralgias, back pain, gait problem, joint swelling, myalgias and neck pain.   Skin:  Negative for color change.   Allergic/Immunologic: Negative for immunocompromised state.   Neurological:  Positive for weakness. Negative for  dizziness, seizures, speech difficulty, numbness and headaches.   Hematological:  Negative for adenopathy.   Psychiatric/Behavioral:  Negative for agitation, behavioral problems, dysphoric mood and suicidal ideas.         Objective:     Limited due to virtual exam       Neurologic Exam     Mental Status   Oriented to person, place, and time.   Attention: normal.   Speech: speech is normal   Level of consciousness: alert  Knowledge: good.   Able to count to 25 in one breath   Able to maintain upward gaze greater than 15 seconds without getting double vision.      Cranial Nerves     CN III, IV, VI   Extraocular motions are normal.   Diplopia: bilateral and horizontal    CN VII   Facial expression full, symmetric.     CN VIII   Hearing: intact    Maintains upward gaze 30+ seconds without drop.     Motor Exam   Muscle bulk: normal    Sensory Exam   Light touch normal.   Vibration normal.     Gait, Coordination, and Reflexes     Tremor   Resting tremor: absent  Intention tremor: absent  Action tremor: absent      Physical Exam  Constitutional:       Appearance: She is well-developed.   HENT:      Head: Normocephalic and atraumatic.   Eyes:      Extraocular Movements: EOM normal.   Neck:      Thyroid: No thyromegaly.   Musculoskeletal:      Cervical back: Normal range of motion.   Lymphadenopathy:      Cervical: No cervical adenopathy.   Neurological:      Mental Status: She is oriented to person, place, and time.   Psychiatric:         Speech: Speech normal.         Behavior: Behavior normal.         Thought Content: Thought content normal.           Lab Results   Component Value Date    WBC 7.11 07/10/2018    HGB 14.4 07/10/2018    HCT 43.4 07/10/2018     07/10/2018    ALT 32 07/10/2018    AST 29 07/10/2018     07/10/2018    K 5.2 (H) 07/10/2018     07/10/2018    CREATININE 1.1 07/10/2018    BUN 19 07/10/2018    CO2 30 (H) 07/10/2018    TSH 1.379 01/09/2014    HGBA1C 6.2 (H) 08/29/2019    [id number]  >1150 (H) 09/30/2011         Quantitative Myasthenia Gravis Test        Items Tested None Mild Moderate Severe Score   Grade 0 1 2 3    Diplopia (secs) 60 11-59 1-10 Spontaneous    Ptosis (upward gaze, secs) 60 11-59 1-10 Spontaneous    Facial Muscles Normal lid closure Complete, weak, some resistance Complete, without resistance Incomplete    Swallowing (1/2 cup water) Normal Minimal coughing or throat clearing Severe coughing or choking or nasal regurgitation Cannot swallow (test not attempted)    Speech following counting aloud from 1-50 (onset of dysarthria) None at #50 Dysarthria at # 30-49 Dysarthria at #10-29 Dysarthria at #9    Right arm outstretched (90 degrees, sitting) sec 240  10-89 0-9    Left arm outstretched (90 degrees, sitting, secs) 240  10-89 0-9    FVC ?80% 65-79% 50-64% <49%    Right hand  (Male/Female) kg ?45 / ?35 15-44 / 10-29 5-15 / 5-9 0-4 / 0-4    Left hand  (Male/Female) kg ?45 / ?35 15-44 / 10-29 5-15 / 5-9 0-4 / 0-4    Head lifted (45% supine, secs) 120  1-29 0    Right leg outstretched (45-50%, supine) sec 100 31-99 1-30 0    Left leg outstretched (45-50%, supine) sec 100 31-99 1-30 0    Total     2       Myasthenia Gravis- Quality of Life Score (revised) - MG QoL-15r  Please indicate how true each statement has been (over the past four weeks).   Not at all Somewhat Very Much    0 1 2   1. I am frustrated by my MG    X    2. I have trouble with my eyes because of my MG (e.g. double vision)   X     3. I have trouble eating because of MG   X     4. I have limited my social activity because of my MG    X    5. My MG limits my ability to enjoy hobbies and fun activities   X     6. I have trouble meeting the needs of my family because of my MG   X     7. I have to make plans around my MG    X    8. I am bothered by limitations in performing my work (include work at home) because of my MG  X    9. I have difficulty speaking due to MG   X     10. I have lost some  personal independence because of my MG (e.g. driving, shopping, running errands)  X    11. I am depressed about my MG   X     12. I have trouble walking due to MG   X     13. I have trouble getting around public places because of my MG   X     14. I feel overwhelmed by my MG   X     15. I have trouble performing my personal grooming needs due to MG   X      Total MG-QoL15r Score 5             Assessment and Plan:     Problem List Items Addressed This Visit       Myasthenia gravis, AChR antibody positive - Primary    Overview     Ab+, thymoma positive, s/p resection (2011), generalized MG, diagnosed 2011  Current Regimen:   Soliris [id number]  Prednisone 5mg/10mg alternating days respectively  Mestinon 60mg Q4-6 hours PRN for weakness      Previously Tried:   PLEX  IVIg  Prior: Imuran -> marrow suppression    2/2/23 MG-ADL: 1  2/29/24 MG-ADL- 1           Current Assessment & Plan     - Decreased prednisone to 7.5/5mg every other day . We will continue taper as tolerated, with Mestinon meant to address any breakthrough MG-related weaknesses. If Mestinon is insufficient, she was instructed to revert to previous higher Prednisone dose.  - Encouraged patient to use mestinon as needed for relief of weakness  - Discussion on patient switching to Ultomiris rather than soliris for infusion schedule ease discussed with the patient side effects and risk factor profile but that patient was amenable to this change but wanted to think about it so did not make a decision today in clinic.       Myasthenia Gravis IMPORTANT INFORMATION:     Elective intubation should be considered if serial measurements of the VC show values less than 20 mL/kg or if the NIF is worse than -30 cm H20 or is progressively worsening after serial evaluation.        Absolute Contraindicated Medications (Category 1) Contraindicated Medications (Category 2) Likely to Worsen MG Cautionary Medications  (Category 3) That May Worsen MG but are Usually Tolerated  "  Curare  Botulinum toxin  D-penicillamine  Interferon alpha     Aminoglycoside (Gentamycin, Kanamycin, Streptomycin, Neomycin, Tobramycin)  Macrolide (Erythromycin, Azithromycin, Telithromycin, Biaxin, Clarithromycin)  Fluoroquinolone - (Ciprofloxacin, Moxifloxacin, Levofloxacin, Delafloxacin, Norfloxacin, Prulifloxacin)  Quinine (Use only for Malaria)  Quinidine  Procainamide  Magnesium salts, IV magnesium replacement  Chloroquine  Hydroxychloroquine  Immune checkpoint inhibitors: Pembrolizumab (Keytruda), Nivolumab (Opdivo), Atezolizumab (Tecentriq), Avelumab (Bavencio), Durvalumab (Imfinzi), Ipilimumab (Yervoy)    Atropine   Corticosteroids  Desferrioxamine  Beta blockers  Statins  Iodinated radiologic contrast agents  Lithium  Benzodiazepines   Calcium Channel Blockers (verapamil)   Doxacurium  Neuromuscular blockades (Succinylcholine, Cisatracurium, Rocuronium, Vecuronium, Atracurium)  Diclomine         THIS PATIENT HAS MYASTHENIA GRAVIS     USE THESE DRUGS WITH CAUTION   1. Antibiotics of the Following Classes               A. Aminoglycosides (end in "mycin", "micin" e.g. Neomycin, tobramycin, gentamicin)               B. Flagyl (metronidazole)               C. Macrolides (e.g. Erythromycin, azithromycin (Zithromax), clarithromycin)   2. Beta Blockers (oral, parenteral and ophthalmic preparations)               A. (end in "olol" e.g. Atenolol, labetalol, metoprolol, propranolol, sotalol, timolol, etc)   3. Calcium Channel Blockers (end in "ipine" e.g. Amlodipine (Norvasc), nicardipine, nifedipine (Procardia), felodipine (Plendil))   4. Corticosteroids & ACTH   5. Interferons   6. Magnesium   7. Narcotic analgesics (if there is respiratory compromise e.g. Demerol, morphine)   8. Phenothiazines (end in "zine" e.g. Compazine, chlorpromazine (Thorazine), fluphenazine (Prolixin), perphenazine (Trilafon), Sparine)   9. Respiratory Depressants   10. Sedatives and Hypnotics       THESE DRUGS SHOULD *NOT* BE USED IN " "PATIENTS WITH MYASTHENIA GRAVIS WITHOUT PRIOR DISCUSSION WITH A NEUROMUSCULAR SPECIALIST   1. Ketolides (e.g. Telithromycin) - FDA BLACK BOX WARNING - Do NOT Use   2. Fluoroquinolones (end in "acin" e.g. Levofloxacin (Levaquin), ciprofloxacin (CIPRO), moxifloxacin (Avelox) - FDA BLACK BOX WARNING   3. Botulinum toxin   4. D-Penicillamine       NURSES -- TRIPLE CHECK BEFORE GIVING THE FOLLOWING DUE TO THE HIGH PROBABILITY OF PROLONGED WEAKNESS OR MG CRISIS   1. Neuromuscular blocking agent (both depolarizing and non-depolarizing)               A. Succinylcholine-like               B. Curare-like   2. Quinidine   3. Quinine           Relevant Medications    predniSONE (DELTASONE) 5 MG tablet    Current chronic use of systemic steroids (Chronic)    History of thymectomy    Overview     November 2011  Thymoma+  S/p radiation therapy         Essential hypertension (Chronic)    Current Assessment & Plan     Discussed with patient importance of management of hypertension due to secondary stroke risk. Patient is on appropriate therapy currently managed by PCP.            Other hyperlipidemia (Chronic)    Current Assessment & Plan     Patients hyperlipidemia is currently stable and well managed by the PCP. Discussed with the patient the associated stroke risk.            Type 2 diabetes mellitus with diabetic nephropathy, without long-term current use of insulin    Current Assessment & Plan     Patients diabetes mellitus type 2 status puts them at an increased risk for neuropathy, stroke, heart attack, and atherosclerosis were discussed with the patient. They voiced understanding. Patient is currently being managed by PCP for DM2. Discussed importance of maintaining adequate control of sugars in order to prevent further disease progression.            On prednisone therapy    Long term current use of immunosuppressive drug         Follow Up in 4 months    MALKA Bettencourt MD  [facility]    "

## 2024-02-29 NOTE — PATIENT INSTRUCTIONS
STEROID TAPER:   7.5mg/5mg every other day respectively of Prednisone if this gives you too much break through weakness go back to 10/5mg every other day as previously. For weakness NOT remedied by mestinon use.          Surgical and Anesthesia Precautions for Patients with Myasthenia Gravis   Patients with myasthenia gravis (MG) are at increased risk for postoperative respiratory failure. Myasthenic crisis can result in significant diaphragmatic weakness requiring prolonged mechanical ventilation.   MG patients with pulmonary and/or bulbar involvement are at an increased risk for aspiration. Premedication with proton pump inhibitors, histamine-2 blockers, or prokinetic agents can be helpful. Avoidance of calcium channel blockers and magnesium is beneficial to assist with muscle contraction integrity.   Typically, patients should be advised to continue their MG medications up to the time of procedure. Treatment with acetylcholinesterase inhibitors (i.e., pyridostigmine) should be continued up to the time of the procedure to help increase available acetylcholine (ACh) at the neuromuscular junction (NMJ).   MG patients are typically more sensitive to the depressive effects of benzodiazepines and opiates, and so caution is advised with the use of these drugs. Due to decreased available ACh, MG patients tend to be more resistant to depolarizing NMJ blocking agents (i.e., succinylcholine) and very sensitive to non-depolarizing NMJ blocking agents (i.e., rocuronium). Volatile anesthetics may sometimes provide sufficient muscle relaxation, depending on the planned procedure. When required, a shorter-acting NMJ blocking agent is preferred. Since general anesthesia causes physiologic stress with the potential to trigger MG exacerbations, multimodal anesthesia can help to maintain sufficient analgesia while lessening the likelihood of MG exacerbation. Consider the use of an epidural to help control the sympathetic nervous  system stress response to the procedure and anesthesia.   If rocuronium or vecuronium are used during procedures, complete reversal is possible with the use of sugammadex. Be aware that neuromuscular blockade reversal with sugammadex is variable and proper evaluations should be made to assess for persistent weakness after blockade, especially the evaluation of good ventilatory function. Patients undergoing thymectomy may have shorter ICU stays and fewer postoperative pulmonary complications when extubated less than 6 hours from the end of the surgery.   Postoperatively, patients should be resumed on their MG medications as soon as is possible, particularly in patients with more severe and/or generalized MG. In recovery, pyridostigmine is available parenterally (2mg pyridostigmine IV = 60mg pyridostigmine PO). Be aware that excessive use of acetylcholinesterase inhibitors can lead to cholinergic crisis (increased weakness, miosis, lacrimation, salivation, bradycardia, urination, defecation).   Surgical procedures stress the body and are a known risk for precipitation of myasthenic crisis. There is a long list of medications contraindicated for use in MG patients (see below). Suspected MG crisis should prompt immediate airway evaluation and management as well as consultation with the neuro critical care team.   Elective intubation should be considered if serial measurements of the VC show values less than 20 mL/kg or if the NIF is worse than -30 cm H20 or is progressively worsening after serial evaluation.   Absolute Contraindicated Medications (Category 1)  Contraindicated Medications (Category 2) Likely to Worsen MG  Cautionary Medications   (Category 3) That May Worsen MG but are Usually Tolerated       Curare    Botulinum toxin    D-penicillamine    Interferon alpha       Aminoglycoside (Gentamycin, Kanamycin, Streptomycin, Neomycin, Tobramycin)    Macrolide (Erythromycin, Azithromycin, Telithromycin, Biaxin,  Clarithromycin)    Fluoroquinolone - (Ciprofloxacin, Moxifloxacin, Levofloxacin, Delafloxacin, Norfloxacin, Prulifloxacin)    Quinine (Use only for Malaria)    Quinidine    Procainamide    Magnesium salts, IV magnesium replacement    Chloroquine    Hydroxychloroquine    Immune checkpoint inhibitors: Pembrolizumab (Keytruda), Nivolumab (Opdivo), Atezolizumab (Tecentriq), Avelumab (Bavencio), Durvalumab (Imfinzi), Ipilimumab (Yervoy)       Atropine    Corticosteroids    Desferrioxamine    Beta blockers    Statins    Iodinated radiologic contrast agents    Lithium    Benzodiazepines    Calcium Channel Blockers (verapamil)    Doxacurium    Neuromuscular blockades (Succinylcholine, Cisatracurium, Rocuronium, Vecuronium, Atracurium)    Diclomine

## 2024-02-29 NOTE — ASSESSMENT & PLAN NOTE
- Decreased prednisone to 7.5/5mg every other day . We will continue taper as tolerated, with Mestinon meant to address any breakthrough MG-related weaknesses. If Mestinon is insufficient, she was instructed to revert to previous higher Prednisone dose.  - Encouraged patient to use mestinon as needed for relief of weakness  - Discussion on patient switching to Ultomiris rather than soliris for infusion schedule ease discussed with the patient side effects and risk factor profile but that patient was amenable to this change but wanted to think about it so did not make a decision today in clinic.       Myasthenia Gravis IMPORTANT INFORMATION:     Elective intubation should be considered if serial measurements of the VC show values less than 20 mL/kg or if the NIF is worse than -30 cm H20 or is progressively worsening after serial evaluation.        Absolute Contraindicated Medications (Category 1) Contraindicated Medications (Category 2) Likely to Worsen MG Cautionary Medications  (Category 3) That May Worsen MG but are Usually Tolerated   Curare  Botulinum toxin  D-penicillamine  Interferon alpha     Aminoglycoside (Gentamycin, Kanamycin, Streptomycin, Neomycin, Tobramycin)  Macrolide (Erythromycin, Azithromycin, Telithromycin, Biaxin, Clarithromycin)  Fluoroquinolone - (Ciprofloxacin, Moxifloxacin, Levofloxacin, Delafloxacin, Norfloxacin, Prulifloxacin)  Quinine (Use only for Malaria)  Quinidine  Procainamide  Magnesium salts, IV magnesium replacement  Chloroquine  Hydroxychloroquine  Immune checkpoint inhibitors: Pembrolizumab (Keytruda), Nivolumab (Opdivo), Atezolizumab (Tecentriq), Avelumab (Bavencio), Durvalumab (Imfinzi), Ipilimumab (Yervoy)    Atropine   Corticosteroids  Desferrioxamine  Beta blockers  Statins  Iodinated radiologic contrast agents  Lithium  Benzodiazepines   Calcium Channel Blockers (verapamil)   Doxacurium  Neuromuscular blockades (Succinylcholine, Cisatracurium, Rocuronium, Vecuronium,  "Atracurium)  Diclomine         THIS PATIENT HAS MYASTHENIA GRAVIS     USE THESE DRUGS WITH CAUTION   1. Antibiotics of the Following Classes               A. Aminoglycosides (end in "mycin", "micin" e.g. Neomycin, tobramycin, gentamicin)               B. Flagyl (metronidazole)               C. Macrolides (e.g. Erythromycin, azithromycin (Zithromax), clarithromycin)   2. Beta Blockers (oral, parenteral and ophthalmic preparations)               A. (end in "olol" e.g. Atenolol, labetalol, metoprolol, propranolol, sotalol, timolol, etc)   3. Calcium Channel Blockers (end in "ipine" e.g. Amlodipine (Norvasc), nicardipine, nifedipine (Procardia), felodipine (Plendil))   4. Corticosteroids & ACTH   5. Interferons   6. Magnesium   7. Narcotic analgesics (if there is respiratory compromise e.g. Demerol, morphine)   8. Phenothiazines (end in "zine" e.g. Compazine, chlorpromazine (Thorazine), fluphenazine (Prolixin), perphenazine (Trilafon), Sparine)   9. Respiratory Depressants   10. Sedatives and Hypnotics       THESE DRUGS SHOULD *NOT* BE USED IN PATIENTS WITH MYASTHENIA GRAVIS WITHOUT PRIOR DISCUSSION WITH A NEUROMUSCULAR SPECIALIST   1. Ketolides (e.g. Telithromycin) - FDA BLACK BOX WARNING - Do NOT Use   2. Fluoroquinolones (end in "acin" e.g. Levofloxacin (Levaquin), ciprofloxacin (CIPRO), moxifloxacin (Avelox) - FDA BLACK BOX WARNING   3. Botulinum toxin   4. D-Penicillamine       NURSES -- TRIPLE CHECK BEFORE GIVING THE FOLLOWING DUE TO THE HIGH PROBABILITY OF PROLONGED WEAKNESS OR MG CRISIS   1. Neuromuscular blocking agent (both depolarizing and non-depolarizing)               A. Succinylcholine-like               B. Curare-like   2. Quinidine   3. Quinine    "

## 2024-06-12 ENCOUNTER — TELEPHONE (OUTPATIENT)
Dept: NEUROLOGY | Facility: CLINIC | Age: 69
End: 2024-06-12
Payer: MEDICARE

## 2024-06-13 NOTE — TELEPHONE ENCOUNTER
----- Message from Mary Cevallos sent at 6/12/2024  1:01 PM CDT -----    Rx Refill/Request     Is this a Refill or New Rx: refill   Rx Name and Strength: Soliris 1200 mg   Preferred Pharmacy with phone number:  [facility] [phone] fax # [phone]     Communication Preference:  Additional Information

## 2024-06-19 NOTE — TELEPHONE ENCOUNTER
Returned call to [facility], they asked for fax number so they could fax over Soliris orders, provided fax number [phone]

## 2024-07-02 ENCOUNTER — TELEPHONE (OUTPATIENT)
Dept: NEUROLOGY | Facility: CLINIC | Age: 69
End: 2024-07-02
Payer: MEDICARE

## 2024-07-08 ENCOUNTER — TELEPHONE (OUTPATIENT)
Dept: NEUROLOGY | Facility: CLINIC | Age: 69
End: 2024-07-08
Payer: MEDICARE

## 2024-07-25 ENCOUNTER — TELEPHONE (OUTPATIENT)
Dept: NEUROLOGY | Facility: CLINIC | Age: 69
End: 2024-07-25
Payer: MEDICARE

## 2024-07-25 NOTE — TELEPHONE ENCOUNTER
----- Message from Angelina Hayward sent at 7/25/2024  9:01 AM CDT -----  Regarding: Order  Contact: [phone]  [facility] is calling stating that they will be faxing over nursing orders for a plan of treatment today for provider to sign. She states they would like the orders faxed back over to [phone], and that these orders are time sensitive.     Fax Number: [phone]  Phone Number: [phone]

## 2024-07-26 ENCOUNTER — TELEPHONE (OUTPATIENT)
Dept: NEUROLOGY | Facility: CLINIC | Age: 69
End: 2024-07-26
Payer: MEDICARE

## 2024-07-29 ENCOUNTER — TELEPHONE (OUTPATIENT)
Dept: NEUROLOGY | Facility: CLINIC | Age: 69
End: 2024-07-29
Payer: MEDICARE

## 2024-07-29 NOTE — TELEPHONE ENCOUNTER
----- Message from Devon Beltre sent at 7/29/2024 11:25 AM CDT -----  Regarding: Faxed Plan of [place]  Hi, [facility] called to ask for the Faxed Plan of Treatment plan over to get Faxed. They have reached out 3 times alr and will not be able to provide the infusion if it is not signed/faxed over. Pls call with questions [phone].   Re faxing again today.  Thank you.

## 2024-07-30 ENCOUNTER — TELEPHONE (OUTPATIENT)
Dept: NEUROLOGY | Facility: CLINIC | Age: 69
End: 2024-07-30
Payer: MEDICARE

## 2024-07-30 NOTE — TELEPHONE ENCOUNTER
----- Message from Mary Mccabe sent at 7/26/2024 11:18 AM CDT -----  Regarding: orders  Contact: [phone]  Home health nurse Marilee calling in requesting call back regarding orders for plan of treatment is sign by  please call to discuss further

## 2024-07-31 NOTE — TELEPHONE ENCOUNTER
Returned call to Marilee, informed her that I would look for orders, and if not found will return call and ask her to refax.

## 2024-08-06 ENCOUNTER — TELEPHONE (OUTPATIENT)
Dept: NEUROLOGY | Facility: CLINIC | Age: 69
End: 2024-08-06
Payer: MEDICARE

## 2024-08-07 ENCOUNTER — TELEPHONE (OUTPATIENT)
Dept: NEUROLOGY | Facility: CLINIC | Age: 69
End: 2024-08-07
Payer: MEDICARE

## 2024-08-12 ENCOUNTER — TELEPHONE (OUTPATIENT)
Dept: NEUROLOGY | Facility: CLINIC | Age: 69
End: 2024-08-12
Payer: MEDICARE

## 2024-09-17 ENCOUNTER — TELEPHONE (OUTPATIENT)
Dept: NEUROLOGY | Facility: CLINIC | Age: 69
End: 2024-09-17
Payer: MEDICARE

## 2024-10-17 ENCOUNTER — OFFICE VISIT (OUTPATIENT)
Dept: NEUROLOGY | Facility: CLINIC | Age: 69
End: 2024-10-17
Payer: MEDICARE

## 2024-10-17 VITALS
WEIGHT: 167.56 LBS | SYSTOLIC BLOOD PRESSURE: 136 MMHG | HEIGHT: 69 IN | HEART RATE: 98 BPM | DIASTOLIC BLOOD PRESSURE: 81 MMHG | BODY MASS INDEX: 24.82 KG/M2

## 2024-10-17 DIAGNOSIS — E78.49 OTHER HYPERLIPIDEMIA: Chronic | ICD-10-CM

## 2024-10-17 DIAGNOSIS — I10 ESSENTIAL HYPERTENSION: Chronic | ICD-10-CM

## 2024-10-17 DIAGNOSIS — G70.00 MYASTHENIA GRAVIS, ACHR ANTIBODY POSITIVE: ICD-10-CM

## 2024-10-17 DIAGNOSIS — Z79.52 CURRENT CHRONIC USE OF SYSTEMIC STEROIDS: Primary | Chronic | ICD-10-CM

## 2024-10-17 DIAGNOSIS — E11.21 TYPE 2 DIABETES MELLITUS WITH DIABETIC NEPHROPATHY, WITHOUT LONG-TERM CURRENT USE OF INSULIN: ICD-10-CM

## 2024-10-17 DIAGNOSIS — Z90.89 HISTORY OF THYMECTOMY: ICD-10-CM

## 2024-10-17 PROCEDURE — 99215 OFFICE O/P EST HI 40 MIN: CPT | Mod: S$PBB,,, | Performed by: PSYCHIATRY & NEUROLOGY

## 2024-10-17 PROCEDURE — 99999 PR PBB SHADOW E&M-EST. PATIENT-LVL IV: CPT | Mod: PBBFAC,,, | Performed by: PSYCHIATRY & NEUROLOGY

## 2024-10-17 PROCEDURE — 99214 OFFICE O/P EST MOD 30 MIN: CPT | Mod: PBBFAC | Performed by: PSYCHIATRY & NEUROLOGY

## 2024-10-17 PROCEDURE — G2211 COMPLEX E/M VISIT ADD ON: HCPCS | Mod: S$PBB,,, | Performed by: PSYCHIATRY & NEUROLOGY

## 2024-10-17 RX ORDER — PREDNISONE 5 MG/1
TABLET ORAL
Qty: 135 TABLET | Refills: 3 | Status: SHIPPED | OUTPATIENT
Start: 2024-10-17

## 2024-10-17 NOTE — PROGRESS NOTES
Subjective:     Chief Complaint:  AChR Antibody Positive MG    Interval History 10/17/2024 - I have reviewed relevant medical records in Epic. Here for routine follow up. On eculizumab per protocol, Prednisone 5/10, and Mestinon 60 PRN, which she uses more out of habit than as needed. She denies any focal disease-related weaknesses but does report that some days she feels like she just doesn't have the energy that she should have. This has not kept her from her daily activities, though. Her visual acuity has worsened significantly, R>L. This is secondary to her retinitis pigmentosa.    Myasthenia Gravis Activities of Daily Living Scale 10.17.2024     Grade   Function 0 1 2 3   Double Vision None Occasional, not every day Daily, but not constant Constant          Eyelid Droop None Occasional, not every day Daily, but not constant Constant          Talking Normal Intermittent slurring or nasal speech Constant slurring or nasal speech, but can be understood Speech difficult to understand          Chewing Normal Fatigues with solid food Fatigues with soft food Gastric tube          Swallowing Normal Chokes rarely Frequent choking requiring change of diet Gastric tube          Breathing Normal Shortness of breath on exertion Shortness of breath at rest Ventilator          Brushing teeth or hair Normal Requires extra effort but requires no rest period Rest periods needed Cannot do one or more of these functions          Ability to rise from chair or toilet Normal Sometimes uses arms Always uses arms Requires assistance          Total MG ADL Score 0           Interval History 02/29/2024    I have reviewed all relevant history in Epic. The patient presents for routine follow up regarding AChR antibody positive MG for which she is managed with soliris [id number], prednisone 5mg/10mg every other day, and mestinon 60mg q4-6 hours PRN for weakness. She notes that she was having some difficulty with swallowing and received a  swallow study which showed evidence of a stricture of the esophagus so she is set to get this repaired. Discussed that this is not a side effect of MG. She does note some occasional weakness which does not prevent her from doing activities but does not find it impairs her daily activities. She does not take the mestinon scheduled, sometimes forgets. She notes that she also takes a 1/2 tablet of mesitnon (30mg) rather than full 60 mg.       Myasthenia Gravis Activities of Daily Living Scale 2/29/24     Grade   Function 0 1 2 3   Double Vision None Occasional, not every day Daily, but not constant Constant          Eyelid Droop None Occasional, not every day Daily, but not constant Constant          Talking Normal Intermittent slurring or nasal speech Constant slurring or nasal speech, but can be understood Speech difficult to understand          Chewing Normal Fatigues with solid food Fatigues with soft food Gastric tube          Swallowing Normal Chokes rarely  Does have diagnosed esophageal stricture Frequent choking requiring change of diet Gastric tube          Breathing Normal Shortness of breath on exertion Shortness of breath at rest Ventilator          Brushing teeth or hair Normal Requires extra effort but requires no rest period Rest periods needed Cannot do one or more of these functions          Ability to rise from chair or toilet Normal Sometimes uses arms Always uses arms Requires assistance          Total MG ADL Score 1               Interval History 01/02/2023  This visit was conducted using a virtual visit with secure two-way interactive video. Provider is located in my office at [facility] and the patient is located within the [facility]. By connecting the patient consented to all virtual visit therapy and treatment planning.     I have reviewed all relevant history in Epic. The patient presents for routine follow up regarding AChR antibody positive MG. Her current therapy is  soliris [id number], prednisone 7.5/5mg every other day respectively, and Mestinon q4-6 hours PRN for weakness. She notes that her most recent soliris infusion was on Monday and went well. She denies any associated symptoms following her infusion. She found that when she tried to reduce her prednisone to 7.5mg/5mg every other day she was having breakthrought symptoms and so she has gone back to using 10mg/5mg every other day respectively.       Interval History 11/3/2022 - I have reviewed relevant medical records in [facility]. Here for a routine follow up for antibody positive MG on Soliris per protocol as well as Prednisone alternating 10 mg and 5 mg. She reports doing really well and is without focal or fatigable weaknesses, no diplopia or ptosis, no dysphagia, dysphonia, dysarthria, YU, orthopnea, or gait changes. She does report that she is strong and can easily do whatever she wants to do physically but feels like she is not as strong overall like she was preciously. When asked directly what it is that she cannot do now that she used to could do, she says that she is able to do everything.    After last visit she weaned Prednisone from 10 daily to 10/5 alternating days and noted no relapses in strength or function. She rarely uses Mestinon.    She reports tolerating eculizumab infusions just fine. She sometimes has a mild headache afterwards but is not bothered too much by them. She gets home infusions. She is up to date in her vaccinations for meningococcal ppx.    Interval History 2/14/2022 - I have reviewed relevant medical records in Epic. Here for routine follow up for AChR Ab+ MG on eculizumab infusions Q2 weeks and taking Prednisone 10/7.5 alternating days, decreased from 10 mg daily in December 2021. She has tolerated the steroids reduction well and without breakthrough weakness. She gets home infusions of the eculizumab and tolerates them without adverse reactions. She has complaints of occasional L UE and L  LE pain with some secondary weakness that occurs with exercise and which improves with exercise and movement. She has had her A1c rechecked recently and it's currently at 9.0%, increased from 4.8% at last visit. She reports indulging in sweets over the past few months.    Interval History 12.13.2021 - I have reviewed relevant medical records in Epic. Here for routine follow up for AChR Ab+ MG, now on eculizumab, which was started in late October / early November 2021. She was previously on IVIg 1g/kg Q4 weeks and was having relapses in MG-related weaknesses at about 2-3 weeks after each infusion. She stopped IVIg shortly after starting eculizumab. She is also taking Prednisone 10 mg daily. She reports good results with eculizumab, with less evidence of relapse in between infusions compared with IVIg. She does say that sometimes she just feels tired all over and has a difficult time getting going, though she denies any jorge luis bulbar or extremity weaknesses recently. She has Mestinon 60 mg for PRN use but has been taking TID scheduled instead. No adverse GI side effects. Recent A1c is improved from 6.2% to 4.8%.    Interval History 5.13.2021 - I have reviewed relevant medical records in Epic. Here for routine follow up for AChR-Ab+ MG. She is taking Prednisone 10 mg daily and is getting home infusions of IVIg 2g/kg Q4 weeks. She is having some relapse weakness in between infusions and taking Mestinon to address with limited success. She responds well to IVIg for 1-2 weeks after infusion, then seems to develop more daily fatigue, gait strength decline, diplopia, and ptosis. She is not interested in increasing Prednisone due to DM2. She had been on IVIg Q6 weeks and at last visit we increased to Q4. She is interested in starting eculizumab since the Ig seems to remain effective for only 1-2 weeks. She has not had any episode worrisome for NM respiratory failure.       History of Present Illness:  I have reviewed all  "relevant medical records in Epic.     Louisa Quarles is a 69 y.o. female who presents today for initial visit with me for AChRAb+ myasthenia gravis, previously followed by [facility] and in [facility]. Maintained on IVIg and Prednisone 10 mg daily. She has recently had her IVIg regimen changed to 50g daily x 3 days every 4 weeks per [facility] (previously on same dose Q6 weeks). She has noticed an improvement in her strength overall, though does report some days of feeling "worn down" but cannot elaborate or specify exactly which groups of muscles or which functions are impaired. She has been taking Mestinon occasionally and is unaware that it is meant to be used as PRN for temporary reversal of MG-related weaknesses. She denies any dysphagia or dysarthria or dysphonia. She has occasional diplopia but no ptosis. She has been exercising at home and reports occasional UE and LE weakness. She had been experiencing relapses in weakness toward the end of her 6 week interval in between Ig infusions. With the change to Q4 weeks, she notes a decline in that phenomenon. She tolerates the infusions well, though occasionally gets headaches during and after. These respond well to Tylenol 1g. She reports good hydration the day prior to her IVIg.    She had thymectomy in 2012, no thymoma  MG diagnosis in about 2011  Some osteoporosis on most recent dexa scan, now on vitamin D supplements per PCP    Secondary stroke risk factors of DM2, HLD, HTN    From Previous Notes:    (Dx 2011, s/p thymectomy + adjuvant RTX), who is presenting to the clinic today for f/u on MG.     Interval Hx (2/10/20):  Continues to complain of generalized muscle weakness. Reports mild worsening of swallowing and intermittent chocking on food. Denies diplopia or ptosis. Currently on Mestinon 30 mg BID, prednisone 10 mg daily and IVIG q6 weeks. She also reports continued medication side effect such as increased secretions and drooling " (mestinon) and loss of taste (steroids). She thinks that the effect of IVIG does not last for the whole 6 weeks. She is still not interested in switching to Soliris or seeing neuromuscular specialist.        Interval Hx (11/18/19):  Patient is 20 minutes late to her appointment, however I spent 30 minutes with her. Overall feels ok, has good and bad days, depending on the day she has and/or stress levels.   She denies any double vision, SOB or choking on food or water unless she is eating fast. She feels weaker in the mornings rather than evenings. Continued on IVIG q6 weeks (due on 11/20), and mestinon 30 mg BID (morning and lunch), and prednisone 15 mg daily.  She is also inquiring about safety of rotator cuff surgery from MG standpoint, provided a print of contraindicated medication (including anesthetics) in myasthenic patients.   Patient is also inquiring about Soliris that we discussed last time. I provided her with printed information again. Explained to her that she should receive infusions weekly x4 weeks and on 5th week will switch to biweekly infusion, each taking 35 minutes. Patient is not interested at this time due to difficulty of transportation every week.         Initial encounter (8/29/19):  Ms Quarles is new to me, she was previously followed by Dr. Abrams and then Dr. Montilla. She was last seen in the clinic in 5/14/19. Per review of charts and previous notes; patient was maintained on prednisone, Mestinon and Imuran in the past. Patient has received PLEX in the past with minimal response (last on Feb 2014). Imuran had to be discontinued in 2015 due to bone marrow suppression. Frequency and dosing of Mestinon and prednisone have been increased throughout the years due to progression of symptoms. Most recently she has been maintained on Prednisone 15 mg QD (tapered from 20 during last visit), Mestinon 30 mg TID; IVIG Q6 weeks (last dose 8/28).     Today she is reporting fluctuations in her  symptoms, she has good and bad days (generalized weakness). She thinks IVIG might give her a boost for a couple of weeks and then she gradually gets back to where she was. She can not tell if tapering steroids has effected her symptoms. Today she denies any double vision, droopy eyelids, slurred speech, nasal speech or difficulty breathing. She intermittently chokes on solid food so she takes small bites. She complains of drooling and increased drainage from sinuses. She does not have any difficulty with walking long distances or working with arms. Recently her vision is more blurred and that causes her to have headaches. She has an appointment with her ophthalmologist in October. Patient also c/o losing the taste and dryness of lips.     Review of Systems  Review of Systems   Constitutional:  Negative for activity change, fatigue and fever.   HENT:  Negative for hearing loss, trouble swallowing and voice change.    Eyes:  Negative for pain, redness and visual disturbance.        Declining visual acuity   Respiratory:  Negative for choking, chest tightness and shortness of breath.    Cardiovascular:  Negative for chest pain.   Gastrointestinal:  Negative for abdominal pain, nausea and vomiting.   Endocrine: Negative for cold intolerance.   Genitourinary:  Negative for frequency.   Musculoskeletal:  Negative for arthralgias, back pain, gait problem, joint swelling, myalgias and neck pain.   Skin:  Negative for color change.   Allergic/Immunologic: Negative for immunocompromised state.   Neurological:  Positive for weakness. Negative for dizziness, seizures, speech difficulty, numbness and headaches.   Hematological:  Negative for adenopathy.   Psychiatric/Behavioral:  Negative for agitation, behavioral problems, dysphoric mood and suicidal ideas.         Objective:     Limited due to virtual exam       Neurologic Exam     Mental Status   Oriented to person, place, and time.   Attention: normal.   Speech: speech is  normal   Level of consciousness: alert  Knowledge: good.   Able to count to 25 in one breath   Able to maintain upward gaze greater than 15 seconds without getting double vision.      Cranial Nerves     CN III, IV, VI   Extraocular motions are normal.   Diplopia: bilateral and horizontal    CN VII   Facial expression full, symmetric.     CN XI   CN XI normal.     Maintains upward gaze 30+ seconds without drop.     Motor Exam   Muscle bulk: normal    Strength   Strength 5/5 throughout. No fatigueable weaknesses with repeated resistance     Sensory Exam   Light touch normal.     Gait, Coordination, and Reflexes     Gait  Gait: normal    Tremor   Resting tremor: absent  Intention tremor: absent  Action tremor: absent      Physical Exam  Vitals reviewed.   Constitutional:       Appearance: She is well-developed.   HENT:      Head: Normocephalic and atraumatic.   Eyes:      Extraocular Movements: Extraocular movements intact and EOM normal.   Neck:      Thyroid: No thyromegaly.   Cardiovascular:      Rate and Rhythm: Normal rate.   Pulmonary:      Effort: Pulmonary effort is normal.   Abdominal:      Palpations: Abdomen is soft.   Musculoskeletal:      Cervical back: Normal range of motion and neck supple.   Lymphadenopathy:      Cervical: No cervical adenopathy.   Skin:     General: Skin is warm and dry.   Neurological:      Mental Status: She is alert and oriented to person, place, and time.      Motor: Motor strength is normal.     Gait: Gait is intact.   Psychiatric:         Speech: Speech normal.         Behavior: Behavior normal.         Thought Content: Thought content normal.           Lab Results   Component Value Date    WBC 7.11 07/10/2018    HGB 14.4 07/10/2018    HCT 43.4 07/10/2018     07/10/2018    ALT 32 07/10/2018    AST 29 07/10/2018     07/10/2018    K 5.2 (H) 07/10/2018     07/10/2018    CREATININE 1.1 07/10/2018    BUN 19 07/10/2018    CO2 30 (H) 07/10/2018    TSH 1.379 01/09/2014     HGBA1C 6.2 (H) 08/29/2019    [id number] >1150 (H) 09/30/2011         Quantitative Myasthenia Gravis Test        Items Tested None Mild Moderate Severe Score   Grade 0 1 2 3    Diplopia (secs) 60 11-59 1-10 Spontaneous    Ptosis (upward gaze, secs) 60 11-59 1-10 Spontaneous    Facial Muscles Normal lid closure Complete, weak, some resistance Complete, without resistance Incomplete    Swallowing (1/2 cup water) Normal Minimal coughing or throat clearing Severe coughing or choking or nasal regurgitation Cannot swallow (test not attempted)    Speech following counting aloud from 1-50 (onset of dysarthria) None at #50 Dysarthria at # 30-49 Dysarthria at #10-29 Dysarthria at #9    Right arm outstretched (90 degrees, sitting) sec 240  10-89 0-9    Left arm outstretched (90 degrees, sitting, secs) 240  10-89 0-9    FVC >=80% 65-79% 50-64% <49%    Right hand  (Male/Female) kg >=45 / >=35 15-44 / 10-29 5-15 / 5-9 0-4 / 0-4    Left hand  (Male/Female) kg >=45 / >=35 15-44 / 10-29 5-15 / 5-9 0-4 / 0-4    Head lifted (45% supine, secs) 120  1-29 0    Right leg outstretched (45-50%, supine) sec 100 31-99 1-30 0    Left leg outstretched (45-50%, supine) sec 100 31-99 1-30 0    Total     2       Myasthenia Gravis- Quality of Life Score (revised) - MG QoL-15r  Please indicate how true each statement has been (over the past four weeks).   Not at all Somewhat Very Much    0 1 2   1. I am frustrated by my MG    X    2. I have trouble with my eyes because of my MG (e.g. double vision)   X     3. I have trouble eating because of MG   X     4. I have limited my social activity because of my MG    X    5. My MG limits my ability to enjoy hobbies and fun activities   X     6. I have trouble meeting the needs of my family because of my MG   X     7. I have to make plans around my MG    X    8. I am bothered by limitations in performing my work (include work at home) because of my MG  X    9. I have difficulty  speaking due to MG   X     10. I have lost some personal independence because of my MG (e.g. driving, shopping, running errands)  X    11. I am depressed about my MG   X     12. I have trouble walking due to MG   X     13. I have trouble getting around public places because of my MG   X     14. I feel overwhelmed by my MG   X     15. I have trouble performing my personal grooming needs due to MG   X      Total MG-QoL15r Score 5             Assessment and Plan:     Problem List Items Addressed This Visit       Myasthenia gravis, AChR antibody positive    Overview     Ab+, thymoma positive, s/p resection (2011), generalized MG, diagnosed 2011  Current Regimen:   Soliris [id number]  Prednisone 5mg/10mg alternating days respectively  Mestinon 60mg Q4-6 hours PRN for weakness      Previously Tried:   PLEX  IVIg  Prior: Imuran -> marrow suppression    2/2/23 MG-ADL: 1  2/29/24 MG-ADL- 1           Current Assessment & Plan     Doing extremely well on current therapy. MG-ADL Scale = 0 today. We discussed changing from eculizumab to ravulizumab and she had a few reservations. Namely, she worries that her disease may not be as effectively managed by ravulizumab. She also worries that her vaccine schedule will be more onerous.    - Continue eculizumab for now   - Paperwork completed and signed for ravulizumab. We discussed the dosing schedule and that the vaccine schedule would not need any changes   - She is due for Men Sero B booster before May 2025, which can be done by the [facility] vaccine service. If done by her PCP, we will need to know date of administration and name brand used.   - Reduce Prednisone to 5 mg daily. If relapse occurs that is insufficiently responsive to Mestinon, she should revert back to 10/5 alternating days   - Mestinon 60 OP Q4-6 hours PRN MG weaknesses    Myasthenia Gravis IMPORTANT INFORMATION:    Elective intubation should be considered if serial measurements of the VC show values less than 20  "mL/kg or if the NIF is worse than -30 cm H20 or is progressively worsening after serial evaluation.      Absolute Contraindicated Medications (Category 1) Contraindicated Medications (Category 2) Likely to Worsen MG Cautionary Medications  (Category 3) That May Worsen MG but are Usually Tolerated   Curare  Botulinum toxin  D-penicillamine  Interferon alpha    Aminoglycoside (Gentamycin, Kanamycin, Streptomycin, Neomycin, Tobramycin)  Macrolide (Erythromycin, Azithromycin, Telithromycin, Biaxin, Clarithromycin)  Fluoroquinolone - (Ciprofloxacin, Moxifloxacin, Levofloxacin, Delafloxacin, Norfloxacin, Prulifloxacin)  Quinine (Use only for Malaria)  Quinidine  Procainamide  Magnesium salts, IV magnesium replacement  Chloroquine  Hydroxychloroquine  Immune checkpoint inhibitors: Pembrolizumab (Keytruda), Nivolumab (Opdivo), Atezolizumab (Tecentriq), Avelumab (Bavencio), Durvalumab (Imfinzi), Ipilimumab (Yervoy)   Atropine   Corticosteroids  Desferrioxamine  Beta blockers  Statins  Iodinated radiologic contrast agents  Lithium  Benzodiazepines   Calcium Channel Blockers (verapamil)   Doxacurium  Neuromuscular blockades (Succinylcholine, Cisatracurium, Rocuronium, Vecuronium, Atracurium)  Diclomine       THIS PATIENT HAS MYASTHENIA GRAVIS     USE THESE DRUGS WITH CAUTION   1. Antibiotics of the Following Classes               A. Aminoglycosides (end in "mycin", "micin" e.g. Neomycin, tobramycin, gentamicin)               B. Flagyl (metronidazole)               C. Macrolides (e.g. Erythromycin, azithromycin (Zithromax), clarithromycin)   2. Beta Blockers (oral, parenteral and ophthalmic preparations)               A. (end in "olol" e.g. Atenolol, labetalol, metoprolol, propranolol, sotalol, timolol, etc)   3. Calcium Channel Blockers (end in "ipine" e.g. Amlodipine (Norvasc), nicardipine, nifedipine (Procardia), felodipine (Plendil))   4. Corticosteroids & ACTH   5. Interferons   6. Magnesium   7. Narcotic analgesics (if " "there is respiratory compromise e.g. Demerol, morphine)   8. Phenothiazines (end in "zine" e.g. Compazine, chlorpromazine (Thorazine), fluphenazine (Prolixin), perphenazine (Trilafon), Sparine)   9. Respiratory Depressants   10. Sedatives and Hypnotics       THESE DRUGS SHOULD *NOT* BE USED IN PATIENTS WITH MYASTHENIA GRAVIS WITHOUT PRIOR DISCUSSION WITH A NEUROMUSCULAR SPECIALIST   1. Ketolides (e.g. Telithromycin) - FDA BLACK BOX WARNING - Do NOT Use   2. Fluoroquinolones (end in "acin" e.g. Levofloxacin (Levaquin), ciprofloxacin (CIPRO), moxifloxacin (Avelox) - FDA BLACK BOX WARNING   3. Botulinum toxin   4. D-Penicillamine       NURSES -- TRIPLE CHECK BEFORE GIVING THE FOLLOWING DUE TO THE HIGH PROBABILITY OF PROLONGED WEAKNESS OR MG CRISIS   1. Neuromuscular blocking agent (both depolarizing and non-depolarizing)               A. Succinylcholine-like               B. Curare-like   2. Quinidine   3. Quinine           Relevant Medications    predniSONE (DELTASONE) 5 MG tablet    Current chronic use of systemic steroids - Primary (Chronic)    History of thymectomy    Overview     November 2011  Thymoma+  S/p radiation therapy         Essential hypertension (Chronic)    Current Assessment & Plan     On appropriate medications and managed by PCP. We discussed the importance of managing all secondary stroke risk factors, including hypertension.           Other hyperlipidemia (Chronic)    Current Assessment & Plan     On appropriate medications and managed by PCP. We discussed the importance of managing all secondary stroke risk factors, including hyperlipidemia.           Type 2 diabetes mellitus with diabetic nephropathy, without long-term current use of insulin    Current Assessment & Plan     On appropriate medications and managed by PCP. We discussed the importance of managing all secondary stroke risk factors, including DM2.              A total of 60 minutes was spent on this encounter and included charts and " records review from other medical providers, imaging and diagnostic testing results review, patient interview and examination and discussion, documentation, and discussion of planning with the neuromuscular nurse to address vaccine needs and therapy plan changes.      Follow Up in 4 months    Hammad Perez MD  [facility]

## 2024-10-18 NOTE — ASSESSMENT & PLAN NOTE
Doing extremely well on current therapy. MG-ADL Scale = 0 today. We discussed changing from eculizumab to ravulizumab and she had a few reservations. Namely, she worries that her disease may not be as effectively managed by ravulizumab. She also worries that her vaccine schedule will be more onerous.    - Continue eculizumab for now   - Paperwork completed and signed for ravulizumab. We discussed the dosing schedule and that the vaccine schedule would not need any changes   - She is due for Men Sero B booster before May 2025, which can be done by the [facility] vaccine service. If done by her PCP, we will need to know date of administration and name brand used.   - Reduce Prednisone to 5 mg daily. If relapse occurs that is insufficiently responsive to Mestinon, she should revert back to 10/5 alternating days   - Mestinon 60 OP Q4-6 hours PRN MG weaknesses    Myasthenia Gravis IMPORTANT INFORMATION:    Elective intubation should be considered if serial measurements of the VC show values less than 20 mL/kg or if the NIF is worse than -30 cm H20 or is progressively worsening after serial evaluation.      Absolute Contraindicated Medications (Category 1) Contraindicated Medications (Category 2) Likely to Worsen MG Cautionary Medications  (Category 3) That May Worsen MG but are Usually Tolerated   Curare  Botulinum toxin  D-penicillamine  Interferon alpha    Aminoglycoside (Gentamycin, Kanamycin, Streptomycin, Neomycin, Tobramycin)  Macrolide (Erythromycin, Azithromycin, Telithromycin, Biaxin, Clarithromycin)  Fluoroquinolone - (Ciprofloxacin, Moxifloxacin, Levofloxacin, Delafloxacin, Norfloxacin, Prulifloxacin)  Quinine (Use only for Malaria)  Quinidine  Procainamide  Magnesium salts, IV magnesium replacement  Chloroquine  Hydroxychloroquine  Immune checkpoint inhibitors: Pembrolizumab (Keytruda), Nivolumab (Opdivo), Atezolizumab (Tecentriq), Avelumab (Bavencio), Durvalumab (Imfinzi), Ipilimumab (Yervoy)   Atropine  "  Corticosteroids  Desferrioxamine  Beta blockers  Statins  Iodinated radiologic contrast agents  Lithium  Benzodiazepines   Calcium Channel Blockers (verapamil)   Doxacurium  Neuromuscular blockades (Succinylcholine, Cisatracurium, Rocuronium, Vecuronium, Atracurium)  Diclomine       THIS PATIENT HAS MYASTHENIA GRAVIS     USE THESE DRUGS WITH CAUTION   1. Antibiotics of the Following Classes               A. Aminoglycosides (end in "mycin", "micin" e.g. Neomycin, tobramycin, gentamicin)               B. Flagyl (metronidazole)               C. Macrolides (e.g. Erythromycin, azithromycin (Zithromax), clarithromycin)   2. Beta Blockers (oral, parenteral and ophthalmic preparations)               A. (end in "olol" e.g. Atenolol, labetalol, metoprolol, propranolol, sotalol, timolol, etc)   3. Calcium Channel Blockers (end in "ipine" e.g. Amlodipine (Norvasc), nicardipine, nifedipine (Procardia), felodipine (Plendil))   4. Corticosteroids & ACTH   5. Interferons   6. Magnesium   7. Narcotic analgesics (if there is respiratory compromise e.g. Demerol, morphine)   8. Phenothiazines (end in "zine" e.g. Compazine, chlorpromazine (Thorazine), fluphenazine (Prolixin), perphenazine (Trilafon), Sparine)   9. Respiratory Depressants   10. Sedatives and Hypnotics       THESE DRUGS SHOULD *NOT* BE USED IN PATIENTS WITH MYASTHENIA GRAVIS WITHOUT PRIOR DISCUSSION WITH A NEUROMUSCULAR SPECIALIST   1. Ketolides (e.g. Telithromycin) - FDA BLACK BOX WARNING - Do NOT Use   2. Fluoroquinolones (end in "acin" e.g. Levofloxacin (Levaquin), ciprofloxacin (CIPRO), moxifloxacin (Avelox) - FDA BLACK BOX WARNING   3. Botulinum toxin   4. D-Penicillamine       NURSES -- TRIPLE CHECK BEFORE GIVING THE FOLLOWING DUE TO THE HIGH PROBABILITY OF PROLONGED WEAKNESS OR MG CRISIS   1. Neuromuscular blocking agent (both depolarizing and non-depolarizing)               A. Succinylcholine-like               B. Curare-like   2. Quinidine   3. Quinine    "

## 2024-10-23 ENCOUNTER — TELEPHONE (OUTPATIENT)
Dept: NEUROLOGY | Facility: CLINIC | Age: 69
End: 2024-10-23
Payer: MEDICARE

## 2024-10-23 NOTE — TELEPHONE ENCOUNTER
----- Message from Marilee sent at 10/22/2024  3:32 PM CDT -----  Regarding: Orders  Contact: [phone]  Who call ? Tami with [facility]     What is the request Details : Tami calling to speak with someone in provider office regarding orders for Solirios. States orders was faxed over need Dr. Chris Sarkar.     Please call  back.       Can clinic  use patient portal  : No     What number to call back : [phone]

## 2024-10-23 NOTE — TELEPHONE ENCOUNTER
Returned phone call to Tami at [facility].  Provided my fax number so she can send over orders for Soliris.  I informed her that patient may be switching to Ultomiris, depending insurance approval and cost, and Tami will fax over a blank order form for Ultomiris so we can complete and send back for PA workup.

## 2024-11-06 ENCOUNTER — TELEPHONE (OUTPATIENT)
Dept: NEUROLOGY | Facility: CLINIC | Age: 69
End: 2024-11-06
Payer: MEDICARE

## 2024-11-06 NOTE — TELEPHONE ENCOUNTER
----- Message from Mary sent at 11/6/2024 11:51 AM CST -----  Type:  Pharmacy Calling to Clarify an RX    Name of Caller:Lucero   [facility]   Prescription Name:Soliris   What do they need to clarify?:need to have the office to send a new prescription   Best Call Back Number: [phone] [facility] [id number] fax # [phone]    Additional Information: none

## 2024-11-06 NOTE — TELEPHONE ENCOUNTER
----- Message from Naty sent at 11/6/2024  1:53 PM CST -----  Regarding: pt advice  Contact: Lauri Fung @[phone]  Caller stated they need clarification on directions for prescriptions. [facility] and [facility] needs to speak to staff. Phone#[phone] option 2. Pls call that number to better discuss

## 2024-11-08 NOTE — TELEPHONE ENCOUNTER
[facility] and spoke with GENEVA Barker, verbal orders for Soliris provided on behalf of Dr. Roge Perez.  I let Lucero know that patient may switch to Ultomiris, and I did not see an order form on their website.  Lucero will fax one over for us to complete and return.

## 2024-12-09 ENCOUNTER — TELEPHONE (OUTPATIENT)
Dept: NEUROLOGY | Facility: CLINIC | Age: 69
End: 2024-12-09
Payer: MEDICARE

## 2024-12-10 NOTE — TELEPHONE ENCOUNTER
----- Message from Med Assistant Tobar sent at 11/26/2024  2:22 PM CST -----  Regarding: FW: Missing Information  Contact: [phone]    ----- Message -----  From: Devon Beltre  Sent: 11/26/2024   1:58 PM CST  To: Chris Guan Staff  Subject: Missing Information                              Type:  Pharmacy Calling to Clarify an RX    Name of Caller:Manuela   [facility]   Prescription Name:Ultomiris   What do they need to clarify?:Resend Infusion Order because it did not come through clearly. Sent by Nurse De La O.   Best Call Back Number:[phone]  Additional Information: Thank you.

## 2024-12-10 NOTE — TELEPHONE ENCOUNTER
----- Message from Med Assistant Tobar sent at 12/2/2024 11:37 AM CST -----  Regarding: FW: Missing Information  Contact: [phone]    ----- Message -----  From: Devon Beltre  Sent: 11/26/2024   1:58 PM CST  To: Chris Guan Staff  Subject: Missing Information                              Type:  Pharmacy Calling to Clarify an RX    Name of Caller:Manuela   [facility]   Prescription Name:Ultomiris   What do they need to clarify?:Resend Infusion Order because it did not come through clearly. Sent by Nurse De La O.   Best Call Back Number:[phone]  Additional Information: Thank you.

## 2024-12-11 ENCOUNTER — TELEPHONE (OUTPATIENT)
Dept: NEUROLOGY | Facility: CLINIC | Age: 69
End: 2024-12-11
Payer: MEDICARE

## 2024-12-11 NOTE — TELEPHONE ENCOUNTER
----- Message from Naty sent at 12/10/2024  3:53 PM CST -----  Regarding: pt advice  Contact: Mckenzie Blair  ..Type:  Needs Medical Advice    Who Called: Mckenzie Blair  Symptoms (please be specific): caller stated soliris pt that have been vaccinated , immunization guidelines have changed. Pt is only showing 2 for bexsero, but needs 3 per the guidelines. Caller needs update if pt got the third vaccination.  Pls call to better discuss     Would the patient rather a call back or a response via [facility]? Call back   Best Call Back Number: [phone], ext. [phone]  Additional Information: n/a

## 2024-12-11 NOTE — TELEPHONE ENCOUNTER
----- Message from Naty sent at 12/10/2024  3:53 PM CST -----  Regarding: pt advice  Contact: Mckenzie Blair  ..Type:  Needs Medical Advice    Who Called: Mckenzie Blair  Symptoms (please be specific): caller stated soliris pt that have been vaccinated , immunization guidelines have changed. Pt is only showing 2 for bexsero, but needs 3 per the guidelines. Caller needs update if pt got the third vaccination.  Pls call to better discuss     Would the patient rather a call back or a response via [facility]? Call back   Best Call Back Number: [phone], ext. [phone]  Additional Information: n/a

## 2024-12-12 ENCOUNTER — TELEPHONE (OUTPATIENT)
Dept: NEUROLOGY | Facility: CLINIC | Age: 69
End: 2024-12-12
Payer: MEDICARE

## 2024-12-12 NOTE — TELEPHONE ENCOUNTER
Returned phone call to [facility] and spoke with Zachary Benson [facility].  I informed him that I left patient a voicemail yesterday and also sent a portal message informing her that her Bexsero booster is due.  Zachary needs confirmation of vaccine before dispensing med.  I placed phone call to patient, and she states that on 11.11.24 she received Trumenba vaccine at [facility].  I informed her that it is okay to have Soliris, since it has been more than 2 weeks since vaccination, but previously patient was vaccinated with Bexsero, and these are not interchangeable.  I called [facility] and spoke with Marco A [facility] and informed her of above.  I told Marco A it is okay to send out Soliris, and that I will fax over her vaccination record once I receive it from the patient.  Marco A provided fax number [phone].

## 2024-12-12 NOTE — TELEPHONE ENCOUNTER
Returned call to [facility] who informed RN that patient is due for Bexsero booster, and they will not ship next Soliris dose out until patient is vaccinated.  Phone call placed to patient, left detailed voicemail informing her of above.  I asked her to go to her local pharmacy to receive the vaccine and to update me either via phone or through the portal when this is done.  I will also send patient a message in the portal.

## 2024-12-12 NOTE — TELEPHONE ENCOUNTER
----- Message from Anila sent at 12/12/2024  1:32 PM CST -----  Regarding: soliris and meningitis vaccine  Contact: [phone] ext [phone]  Chio with [facility] called to find out if Jairon had spoken to the Dr about getting information about what choice is being made for this patient's medication This medication is scheduled to go out today so if someone can call them back asap.     Please call back and speak with any of the pharmacist  at  [phone] ext [phone]

## 2024-12-30 DIAGNOSIS — G70.00 MYASTHENIA GRAVIS, ACHR ANTIBODY POSITIVE: ICD-10-CM

## 2024-12-30 RX ORDER — PYRIDOSTIGMINE BROMIDE 60 MG/1
TABLET ORAL
Qty: 120 TABLET | Refills: 11 | Status: SHIPPED | OUTPATIENT
Start: 2024-12-30

## 2025-01-08 ENCOUNTER — TELEPHONE (OUTPATIENT)
Facility: CLINIC | Age: 70
End: 2025-01-08

## 2025-01-08 NOTE — TELEPHONE ENCOUNTER
----- Message from Dionna sent at 1/8/2025  9:38 AM CST -----  [facility]    ulgomiris 100 mg , missing last page of paper work with     signature ,  can called in  and  given a verbal as well       Call back [phone]        Fax [phone]

## 2025-01-09 NOTE — TELEPHONE ENCOUNTER
----- Message from Lillie sent at 1/9/2025 12:06 PM CST -----  Regarding: Needing nursing orders refaxed  Contact: [phone]  Type:  Needs Medical Advice    Who Called: Ree calling from [facility]    Needing nursing orders refaxed for the Ultimiris  Only pages 1& 2 of the 4 came through  Would the patient rather a call back or a response via [facility]? Call back  Best Call Back Number: [phone]  Fax [phone]

## 2025-01-09 NOTE — TELEPHONE ENCOUNTER
----- Message from Lillie sent at 1/9/2025 12:06 PM CST -----  Regarding: Needing nursing orders refaxed  Contact: [phone]  Type:  Needs Medical Advice    Who Called: Ree calling from [facility]    Needing nursing orders refaxed for the Ultimiris  Only pages 1& 2 of the 4 came through  Would the patient rather a call back or a response via [facility]? Call back  Best Call Back Number: [phone]  Fax [phone]

## 2025-01-10 ENCOUNTER — TELEPHONE (OUTPATIENT)
Facility: CLINIC | Age: 70
End: 2025-01-10
Payer: MEDICARE

## 2025-01-10 NOTE — TELEPHONE ENCOUNTER
----- Message from Hafsa sent at 1/10/2025 11:51 AM CST -----  Regarding: Pharmacy Needing Assistance  PHARMACY NEEDING ASSISTANCE    [facility]    Name of Caller: Sherita    Regarding RX:  Ultomiris (IV Sol) 100 ml    Reason for Call: Clarification on Rx-The last page of the fax is missing because it has the drs signature.    Call Back Number: [phone] Fax [phone]

## 2025-01-10 NOTE — TELEPHONE ENCOUNTER
Phone call placed to [facility] pharmacy, spoke with HOPE Strickland, verbal orders for Ultomiris provided to Marco A on behalf of Dr. Perez.  Marco A read back order correctly.

## 2025-01-10 NOTE — TELEPHONE ENCOUNTER
----- Message from Dionna sent at 1/10/2025 10:03 AM CST -----   [facility] , calling in regards to pt  nursing orders , office received orders but  part of page 3 and all of page  4 was     completely blank , needs to be re faxed ASAP , has called and left a few messages with no     one calling back , pt is very frustrated , if orders are not re faxed , pt will have to start all over again     Call back [phone]    fax [phone]

## 2025-01-10 NOTE — TELEPHONE ENCOUNTER
----- Message from Dionna sent at 1/10/2025 10:03 AM CST -----   [facility] , calling in regards to pt  nursing orders , office received orders but  part of page 3 and all of page  4 was     completely blank , needs to be re faxed ASAP , has called and left a few messages with no     one calling back , pt is very frustrated , if orders are not re faxed , pt will have to start all over again     Call back [phone]    fax [phone]

## 2025-01-23 ENCOUNTER — TELEPHONE (OUTPATIENT)
Facility: CLINIC | Age: 70
End: 2025-01-23
Payer: MEDICARE

## 2025-01-23 NOTE — TELEPHONE ENCOUNTER
----- Message from Hafsa sent at 1/21/2025  1:51 PM CST -----  Regarding: Consult/Advisory  Contact: ОЛЬГА CARTER [[id number]]  CONSULT/ADVISORY    Name of Caller:  ОЛЬГА CARTER [[id number]]    Contact Preference: [phone] (home)         Nature of Call:  Requesting a call back from Jairon in regards to her treatment.

## 2025-01-29 NOTE — TELEPHONE ENCOUNTER
Spoke with patient, states that she has started Ultomiris on 1.20.25.  States infusion went okay, but spoke with Whit, nurse at [facility], and Whit needs to coordinate with me about [facility] orders so she can get vaccinated.  I told patient that I would email Whit to coordinate, and I would also refax the [facility] order form and sent to patient as well.

## 2025-03-10 ENCOUNTER — TELEPHONE (OUTPATIENT)
Facility: CLINIC | Age: 70
End: 2025-03-10
Payer: MEDICARE

## 2025-03-10 NOTE — TELEPHONE ENCOUNTER
----- Message from Evelyn sent at 3/10/2025  1:26 PM CDT -----  Regarding: [facility] called states need to speak with someone regarding order faxed over states not all info came over  Name of Who is Calling:  What is the request in detail:[facility] called states need to speak with someone regarding order faxed over states not all info came over needs 3rd dose for the BEXSERO. Please advise   Can the clinic reply by [facility]:No  What Number to Call Back if not in [place]: [phone] [facility]

## 2025-03-10 NOTE — TELEPHONE ENCOUNTER
Returned phone call to [facility], spoke with [facility] Ann[facility].  Bexsero is now a 3 series vaccine, need verbal orders to change from 2 series to 3 series.  Verbal orders provided on behalf of Dr. Perez.

## 2025-06-10 DIAGNOSIS — G70.00 MYASTHENIA GRAVIS, ACHR ANTIBODY POSITIVE: ICD-10-CM

## 2025-06-10 RX ORDER — PREDNISONE 5 MG/1
TABLET ORAL
Qty: 135 TABLET | Refills: 3 | Status: CANCELLED | OUTPATIENT
Start: 2025-06-10

## 2025-06-11 NOTE — TELEPHONE ENCOUNTER
Copied from CRM #[id number]. Topic: General Inquiry - Patient Advice  >> Jun 11, 2025 10:39 AM Shannon wrote:  Pt is calling to speak with Jairon regarding infusion treatment pls advise

## 2025-06-13 DIAGNOSIS — G70.00 MYASTHENIA GRAVIS, ACHR ANTIBODY POSITIVE: ICD-10-CM

## 2025-06-16 RX ORDER — PREDNISONE 5 MG/1
TABLET ORAL
Qty: 135 TABLET | Refills: 3 | Status: SHIPPED | OUTPATIENT
Start: 2025-06-16

## 2025-06-17 NOTE — TELEPHONE ENCOUNTER
Returned phone call.  Patient asked who could take over her care now that Dr. Perez has left [facility].  Patient would prefer a neurologist instead of an [facility].  Patient would like to discuss possibly switching back to Soliris, as she feels it worked better than Ultomiris.  Appointment scheduled for 7.31.25 at 1300 with Dr. Robbins.

## 2025-07-31 ENCOUNTER — OFFICE VISIT (OUTPATIENT)
Facility: CLINIC | Age: 70
End: 2025-07-31
Payer: MEDICARE

## 2025-07-31 VITALS
DIASTOLIC BLOOD PRESSURE: 83 MMHG | BODY MASS INDEX: 22.48 KG/M2 | SYSTOLIC BLOOD PRESSURE: 137 MMHG | WEIGHT: 157 LBS | HEART RATE: 87 BPM | HEIGHT: 70 IN

## 2025-07-31 DIAGNOSIS — G70.00 MYASTHENIA GRAVIS, ACHR ANTIBODY POSITIVE: Primary | ICD-10-CM

## 2025-07-31 PROCEDURE — 99214 OFFICE O/P EST MOD 30 MIN: CPT | Mod: PBBFAC | Performed by: PSYCHIATRY & NEUROLOGY

## 2025-07-31 PROCEDURE — 99999 PR PBB SHADOW E&M-EST. PATIENT-LVL IV: CPT | Mod: PBBFAC,,, | Performed by: PSYCHIATRY & NEUROLOGY

## 2025-07-31 RX ORDER — PYRIDOSTIGMINE BROMIDE 60 MG/1
TABLET ORAL
Qty: 120 TABLET | Refills: 11 | Status: SHIPPED | OUTPATIENT
Start: 2025-07-31

## 2025-07-31 RX ORDER — TERBINAFINE HYDROCHLORIDE 250 MG/1
250 TABLET ORAL DAILY
COMMUNITY

## 2025-07-31 RX ORDER — PREDNISONE 5 MG/1
TABLET ORAL
Qty: 135 TABLET | Refills: 3 | Status: SHIPPED | OUTPATIENT
Start: 2025-07-31

## 2025-07-31 NOTE — PROGRESS NOTES
Patient Name: Louisa Quarles  MRN: [id number]    CC: Myasthenia Gravis  DX:  2011  Thymic status:  Thymectomy 2012  Maintenance:  Ravulizumab (Ultomiris), Prednisone 5/10, Mestinon 60 mg PRN  Last dose change: Several months ago (after 10/17/24 visit)  Rescue:  IVIG  Prior immunemodulatory agents: Imuran, Eculizumab (Soliris)    History of Present Illness      MYASTHENIA GRAVIS:  Ms. Quarles is a 70-yr-old woman who is seen as a new patient to me for MG.  She has previously followed with Dr. Hammad Perez until his departure from [facility] earlier this year.  She was last seen in clinic on 10/17/24.    By way of history, she was diagnosed with myasthenia gravis in 2011 and underwent thymectomy in 2012 for a thymoma. Her condition is currently well-controlled and stable without significant exacerbation. She denies experiencing double vision or significant myasthenic symptoms. Past treatments included Azathioprine (Imuran) early in her diagnosis and IVIG. She recently transitioned from Soliris to Ultamiris, having received 6-7 treatments at 8-week intervals. She notes the most recent treatment's effectiveness appears to have a slightly shorter duration compared to previous treatments, but remains satisfied with the current treatment plan.    CURRENT MEDICATIONS:  She alternates between Prednisone 5mg and 10mg on alternating days. She takes Mestinon 60 mg every four hours as needed, with dosage varying based on symptoms.    DIABETES:  Her hemoglobin A1C has improved from 8.5 in March to 7.2 currently, which she attributes to prednisone treatment and lifestyle modifications. She maintains mindful nutritional intake despite acknowledging diet is not perfect.    VISION:  She reports current vision problems, which she states are unrelated to myasthenia gravis.    BLOOD PRESSURE:  She experiences elevated blood pressure specifically during medical office visits due to white coat syndrome, but reports normal levels outside of  clinical settings.      ROS:  Eyes: +blurry vision, +loss of vision  Respiratory: -shortness of breath  Musculoskeletal: +muscle weakness  Neurological: -speech difficulty  Psychiatric: +anxiety, +sleep difficulty, +sleep disturbances          Myasthenia Gravis Activities of Daily Living Scale     Grade   Function 0 1 2 3   Double Vision None Occasional, not every day Daily, but not constant Constant          Eyelid Droop None Occasional, not every day Daily, but not constant Constant          Talking Normal Intermittent slurring or nasal speech Constant slurring or nasal speech, but can be understood Speech difficult to understand          Chewing Normal Fatigues with solid food Fatigues with soft food Gastric tube          Swallowing Normal Chokes rarely Frequent choking requiring change of diet Gastric tube          Breathing Normal Shortness of breath on exertion Shortness of breath at rest Ventilator          Brushing teeth or hair Normal Requires extra effort but requires no rest period Rest periods needed Cannot do one or more of these functions          Ability to rise from chair or toilet Normal Sometimes uses arms Always uses arms Requires assistance          Total MG ADL Score 0          Past Medical History  Past Medical History:   Diagnosis Date    Cancer     Diabetes mellitus     Essential hypertension 9/1/2020    Headache(784.0)     HEARING LOSS     History of thymectomy 7/16/2012    Memory loss     Myasthenia exacerbation     Myasthenia gravis with acute exacerbation     Myasthenia gravis with thymoma     Thymoma     Vision abnormalities     Stargadtch's disease/Macular degeneration -causes vision loss bilaterall, right worse than left        Medications  Current Medications[1]    Allergies  Review of patient's allergies indicates:  No Known Allergies    Social History  Social History[2]    Family History  Family History   Problem Relation Name Age of Onset    Cancer Sister          breast     "Diabetes Sister         Physical Exam  /83 (BP Location: Left arm, Patient Position: Sitting)   Pulse 87   Ht 5' 10" (1.778 m)   Wt 71.2 kg (157 lb 0.2 oz)   BMI 22.53 kg/m²   (Says she is only hypertensive at doctor appointments.)    General appearance: Well-developed woman in NAD    Neurologic Exam: The patient is awake, alert and oriented. Language is fluent. Recent and remote memory normal. Attention span and concentration are normal. Fund of knowledge is appropriate.     Cranial nerves:  Intact except for impaired vision    Motor examination of all extremities demonstrates normal bulk and tone in all four limbs. No atrophy or fasciculations. Strength is 5/5 in the upper and lower extremities bilaterally.     Deep tendon reflexes are 2+ and symmetric in the upper and 1+ lower extremities bilaterally.      Gait: Normal casual gait.    Coordination:  No tremors at rest or with action    Lab and Test Results (Reviewed labs that she brought with her).    All normal except 7.2 HgA1C (7/22/25), but this is improved from prior testing.     Assessment & Plan    MYASTHENIA GRAVIS, ACHR ANTIBODY POSITIVE:   Continued Ultomiris (ravulizumab) treatment every 8 weeks, switched from Soliris (eculizumab) earlier this year; treatment appears effective, with improved symptom control and longer duration of effect compared to initial treatments.   Continued current treatment regimen, including Prednisone 5 mg, 10 mg every other day and Mestinon (pyridostigmine) every 4 hours as needed, as functionally doing well.   Considered potential future switch back to Soliris if needed, but agreed to continue current treatment for now.   Explained magnesium, which she's been taking bid, is contraindicated in myasthenia gravis patients due to potential to worsen muscle weakness at neuromuscular junction.   Advised discontinuation of magnesium supplementation (previously 500 mg twice daily) due to potential exacerbation of myasthenia " gravis symptoms.   Monitored diabetes management, noting improvement in A1C from 8.5 in March to 7.2 recently.   Reviewed recent lab results, including iron, cholesterol, renal function, folate, vitamin D, vitamin B12, and thyroid, all within acceptable ranges.   Discussed importance of calcium and vitamin D supplementation, especially for patients on steroids, to support bone health.   Follow up for next Ultomiris treatment scheduled for September.   Contact the office if symptoms worsen before the scheduled 8-week treatment.    PLAN SUMMARY:   Continue Ultomiris (ravulizumab) treatment every 8 weeks   Maintain current regimen of Prednisone 5 mg, 10 mg every other day   Continue Mestinon (pyridostigmine) every 4 hours as needed   Discontinue magnesium supplementation (previously 500 mg twice daily)   Advised calcium and vitamin D supplementation   Contact office if symptoms worsen before scheduled treatment   Next Ultomiris treatment scheduled for September   Follow-up in 8 weeks for next treatment        Myasthenia Gravis IMPORTANT INFORMATION:    Elective intubation should be considered if serial measurements of the VC show values less than 20 mL/kg or if the NIF is worse than -30 cm H20 or is progressively worsening after serial evaluation.      Absolute Contraindicated Medications (Category 1) Contraindicated Medications (Category 2) Likely to Worsen MG Cautionary Medications  (Category 3) That May Worsen MG but are Usually Tolerated   Curare  Botulinum toxin  D-penicillamine  Interferon alpha    Aminoglycoside (Gentamycin, Kanamycin, Streptomycin, Neomycin, Tobramycin)  Macrolide (Erythromycin, Azithromycin, Telithromycin, Biaxin, Clarithromycin)  Fluoroquinolone - (Ciprofloxacin, Moxifloxacin, Levofloxacin, Delafloxacin, Norfloxacin, Prulifloxacin)  Quinine (Use only for Malaria)  Quinidine  Procainamide  Magnesium salts, IV magnesium replacement  Chloroquine  Hydroxychloroquine  Immune checkpoint inhibitors:  "Pembrolizumab (Keytruda), Nivolumab (Opdivo), Atezolizumab (Tecentriq), Avelumab (Bavencio), Durvalumab (Imfinzi), Ipilimumab (Yervoy)   Atropine   Corticosteroids  Desferrioxamine  Beta blockers  Statins  Iodinated radiologic contrast agents  Lithium  Benzodiazepines   Calcium Channel Blockers (verapamil)   Doxacurium  Neuromuscular blockades (Succinylcholine, Cisatracurium, Rocuronium, Vecuronium, Atracurium)  Diclomine       THIS PATIENT HAS MYASTHENIA GRAVIS     USE THESE DRUGS WITH CAUTION   1. Antibiotics of the Following Classes               A. Aminoglycosides (end in "mycin", "micin" e.g. Neomycin, tobramycin, gentamicin)               B. Flagyl (metronidazole)               C. Macrolides (e.g. Erythromycin, azithromycin (Zithromax), clarithromycin)   2. Beta Blockers (oral, parenteral and ophthalmic preparations)               A. (end in "olol" e.g. Atenolol, labetalol, metoprolol, propranolol, sotalol, timolol, etc)   3. Calcium Channel Blockers (end in "ipine" e.g. Amlodipine (Norvasc), nicardipine, nifedipine (Procardia), felodipine (Plendil))   4. Corticosteroids & ACTH   5. Interferons   6. Magnesium   7. Narcotic analgesics (if there is respiratory compromise e.g. Demerol, morphine)   8. Phenothiazines (end in "zine" e.g. Compazine, chlorpromazine (Thorazine), fluphenazine (Prolixin), perphenazine (Trilafon), Sparine)   9. Respiratory Depressants   10. Sedatives and Hypnotics       THESE DRUGS SHOULD *NOT* BE USED IN PATIENTS WITH MYASTHENIA GRAVIS WITHOUT PRIOR DISCUSSION WITH A NEUROMUSCULAR SPECIALIST   1. Ketolides (e.g. Telithromycin) - FDA BLACK BOX WARNING - Do NOT Use   2. Fluoroquinolones (end in "acin" e.g. Levofloxacin (Levaquin), ciprofloxacin (CIPRO), moxifloxacin (Avelox) - FDA BLACK BOX WARNING   3. Botulinum toxin   4. D-Penicillamine       NURSES -- TRIPLE CHECK BEFORE GIVING THE FOLLOWING DUE TO THE HIGH PROBABILITY OF PROLONGED WEAKNESS OR MG CRISIS   1. Neuromuscular blocking " agent (both depolarizing and non-depolarizing)               A. Succinylcholine-like               B. Curare-like   2. Quinidine   3. Quinine     Farnaz Robbins MD, FAAN   Department of Neurology  [facility] [place] [address]    This note was generated with the assistance of ambient listening technology. Verbal consent was obtained by the patient and accompanying visitor(s) for the recording of patient appointment to facilitate this note. I attest to having reviewed and edited the generated note for accuracy, though some syntax or spelling errors may persist. Please contact the author of this note for any clarification.            [1]   Current Outpatient Medications:     ACCU-CHEK SHAKIRA PLUS METER Misc, , Disp: , Rfl:     ACCU-CHEK SHAKIRA PLUS Strp, , Disp: , Rfl:     ACCU-CHEK MULTICLIX LANCET lancets, , Disp: , Rfl:     alpha lipoic acid 600 mg Cap, Take by mouth once daily., Disp: , Rfl:     aspirin 81 MG chewable tablet, Take 81 mg by mouth once daily., Disp: , Rfl:     atorvastatin (LIPITOR) 20 MG tablet, 20 mg., Disp: , Rfl:     AZELASTINE/FLUTICASONE (DYMISTA NASL), by Nasal route., Disp: , Rfl:     calcium carbonate (OS-MAE) 600 mg (1,500 mg) Tab, Take 600 mg by mouth 2 (two) times daily with meals., Disp: , Rfl:     calcium citrate-vitamin D3 315-200 mg (CITRACAL+D) 315-200 mg-unit per tablet, Take by mouth 2 (two) times daily., Disp: , Rfl:     carvedilol (COREG) 3.125 MG tablet, Take 3.125 mg by mouth 2 (two) times daily with meals., Disp: , Rfl:     citalopram (CELEXA) 20 MG tablet, Take 20 mg by mouth once daily., Disp: , Rfl:     docusate sodium (COLACE) 100 MG capsule, Take 100 mg by mouth., Disp: , Rfl:     fluticasone propionate (FLONASE) 50 mcg/actuation nasal spray, , Disp: , Rfl: 11    HUMALOG KWIKPEN 100 unit/mL InPn pen, , Disp: , Rfl:     insulin aspart protamine-insulin aspart (NOVOLOG 70/30) 100 unit/mL (70-30) InPn, Inject 100 Units into the skin 3 (three) times daily.,  Disp: , Rfl:     insulin detemir (LEVEMIR) 100 unit/mL injection, Inject 100 Units into the skin every evening., Disp: , Rfl:     insulin glargine 100 unit/mL Crtg, Inject 60 Units into the skin once daily., Disp: , Rfl:     ipratropium (ATROVENT) 0.03 % nasal spray, 2 sprays by Nasal route 2 (two) times daily., Disp: , Rfl:     JUBLIA 10 % Martin, , Disp: , Rfl:     melatonin 1 mg Tab, Take by mouth., Disp: , Rfl:     metformin (GLUCOPHAGE) 500 MG tablet, Take 500 mg by mouth before breakfast., Disp: , Rfl:     NOVOLOG FLEXPEN 100 unit/mL InPn pen, , Disp: , Rfl:     nystatin (MYCOSTATIN) 100,000 unit/mL suspension, 100,000 mg., Disp: , Rfl:     omeprazole (PRILOSEC) 40 MG capsule, Take 1 capsule (40 mg total) by mouth once daily., Disp: 30 capsule, Rfl: 10    oxycodone-acetaminophen (PERCOCET) 5-325 mg per tablet, Take 1 tablet by mouth every 6 (six) hours as needed., Disp: , Rfl:     potassium chloride (KLOR-CON) 20 mEq packet, Take 20 mEq by mouth once daily., Disp: 30 tablet, Rfl: 10    predniSONE (DELTASONE) 5 MG tablet, Take 5 mg and 10 mg every other day respectively, Disp: 135 tablet, Rfl: 3    pyridostigmine (MESTINON) 60 mg Tab, TAKE 1 TABLET BY MOUTH EVERY 4 HOURS AS NEEDED FOR  MYASTHENIA  WEAKNESS, Disp: 120 tablet, Rfl: 11    ranitidine (ZANTAC) 75 MG tablet, Take 75 mg by mouth once daily., Disp: , Rfl:     terbinafine HCL (LAMISIL) 250 mg tablet, Take 250 mg by mouth once daily., Disp: , Rfl:     travoprost (TRAVATAN Z) 0.004 % ophthalmic solution, Travatan Z 0.004 % eye drops, Disp: , Rfl:     TRESIBA FLEXTOUCH U-200 200 unit/mL (3 mL) InPn, , Disp: , Rfl:     vitamin D 185 MG Tab, Take 185 mg by mouth once daily., Disp: , Rfl:     zolpidem (AMBIEN) 10 mg Tab, 10 mg., Disp: , Rfl:     Current Facility-Administered Medications:     Immune Globulin G (IGG)-PRO-IGA 10 % injection (Privigen) 10 % injection 75 g, 1 g/kg, Intravenous, Q28 Days, Savanah Moran MD  [2]   Social History  Socioeconomic  History    Marital status: Single   Tobacco Use    Smoking status: Former     Current packs/day: 0.00     Average packs/day: 0.5 packs/day for 10.0 years (5.0 ttl pk-yrs)     Types: Cigarettes     Start date: 2001     Quit date: 2011     Years since quittin.0   Substance and Sexual Activity    Alcohol use: No    Drug use: No

## 2025-07-31 NOTE — PATIENT INSTRUCTIONS
Stop Magnesium  Continue current treatment with the goal of weaning steroids over time.  Will send my note and vaccination record to Dr. Anderson.

## 2025-08-05 ENCOUNTER — TELEPHONE (OUTPATIENT)
Facility: CLINIC | Age: 70
End: 2025-08-05
Payer: MEDICARE

## 2025-08-05 NOTE — TELEPHONE ENCOUNTER
Immunization report faxed to patient's PCP, Dr. Manish Anderson at [phone] per their request.